# Patient Record
Sex: MALE | Race: OTHER | Employment: OTHER | URBAN - METROPOLITAN AREA
[De-identification: names, ages, dates, MRNs, and addresses within clinical notes are randomized per-mention and may not be internally consistent; named-entity substitution may affect disease eponyms.]

---

## 2017-09-12 ENCOUNTER — LAB ENCOUNTER (OUTPATIENT)
Dept: LAB | Facility: HOSPITAL | Age: 52
End: 2017-09-12
Attending: EMERGENCY MEDICINE
Payer: COMMERCIAL

## 2017-09-12 DIAGNOSIS — Z00.00 ROUTINE HEALTH MAINTENANCE: Primary | ICD-10-CM

## 2017-09-12 LAB
ANION GAP SERPL CALC-SCNC: 9 MMOL/L (ref 0–18)
BASOPHILS # BLD: 0.1 K/UL (ref 0–0.2)
BASOPHILS NFR BLD: 1 %
BUN SERPL-MCNC: 14 MG/DL (ref 8–20)
BUN/CREAT SERPL: 13.5 (ref 10–20)
CALCIUM SERPL-MCNC: 9.1 MG/DL (ref 8.5–10.5)
CEA SERPL-MCNC: 1 NG/ML (ref 0–5)
CHLORIDE SERPL-SCNC: 102 MMOL/L (ref 95–110)
CHOLEST SERPL-MCNC: 156 MG/DL (ref 110–200)
CO2 SERPL-SCNC: 28 MMOL/L (ref 22–32)
CREAT SERPL-MCNC: 1.04 MG/DL (ref 0.5–1.5)
EOSINOPHIL # BLD: 0.2 K/UL (ref 0–0.7)
EOSINOPHIL NFR BLD: 2 %
ERYTHROCYTE [DISTWIDTH] IN BLOOD BY AUTOMATED COUNT: 14.9 % (ref 11–15)
GLUCOSE SERPL-MCNC: 100 MG/DL (ref 70–99)
HBA1C MFR BLD: 5.8 % (ref 4–6)
HCT VFR BLD AUTO: 41.1 % (ref 41–52)
HDLC SERPL-MCNC: 53 MG/DL
HGB BLD-MCNC: 13.3 G/DL (ref 13.5–17.5)
LDLC SERPL CALC-MCNC: 93 MG/DL (ref 0–99)
LYMPHOCYTES # BLD: 1.8 K/UL (ref 1–4)
LYMPHOCYTES NFR BLD: 17 %
MCH RBC QN AUTO: 27.6 PG (ref 27–32)
MCHC RBC AUTO-ENTMCNC: 32.4 G/DL (ref 32–37)
MCV RBC AUTO: 85.2 FL (ref 80–100)
MONOCYTES # BLD: 1.1 K/UL (ref 0–1)
MONOCYTES NFR BLD: 11 %
NEUTROPHILS # BLD AUTO: 7.3 K/UL (ref 1.8–7.7)
NEUTROPHILS NFR BLD: 70 %
NONHDLC SERPL-MCNC: 103 MG/DL
OSMOLALITY UR CALC.SUM OF ELEC: 289 MOSM/KG (ref 275–295)
PLATELET # BLD AUTO: 302 K/UL (ref 140–400)
PMV BLD AUTO: 8.6 FL (ref 7.4–10.3)
POTASSIUM SERPL-SCNC: 4.3 MMOL/L (ref 3.3–5.1)
PSA SERPL-MCNC: 1.1 NG/ML (ref 0–4)
RBC # BLD AUTO: 4.82 M/UL (ref 4.5–5.9)
SODIUM SERPL-SCNC: 139 MMOL/L (ref 136–144)
TRIGL SERPL-MCNC: 50 MG/DL (ref 1–149)
WBC # BLD AUTO: 10.5 K/UL (ref 4–11)

## 2017-09-12 PROCEDURE — 82378 CARCINOEMBRYONIC ANTIGEN: CPT

## 2017-09-12 PROCEDURE — 36415 COLL VENOUS BLD VENIPUNCTURE: CPT

## 2017-09-12 PROCEDURE — 83036 HEMOGLOBIN GLYCOSYLATED A1C: CPT

## 2017-09-12 PROCEDURE — 80048 BASIC METABOLIC PNL TOTAL CA: CPT

## 2017-09-12 PROCEDURE — 84153 ASSAY OF PSA TOTAL: CPT

## 2017-09-12 PROCEDURE — 80061 LIPID PANEL: CPT

## 2017-09-12 PROCEDURE — 85025 COMPLETE CBC W/AUTO DIFF WBC: CPT

## 2017-10-02 NOTE — PROGRESS NOTES
[address] [facility] [address] Gastroenterology                                                                                                  Clinic History and Physical     Pa Smokeless tobacco: Never Used                      Alcohol use:  No                 Medications (Active prior to today's visit):    Current Outpatient Prescriptions:  Na Sulfate-K Sulfate-Mg Sulf (SUPREP BOWEL PREP KIT) 17.5-3.13-1.6 GM/18 discussed with patient today. See HPI and A&P for further details.       ASSESSMENT/PLAN:   Kaelyn Reeves is a 50year old year-old male pt of Dr. Stanton Vazquez with history of prediabetes, FH colon/pancreatic/liver CA, who presents for colon cancer screening tamiko & Referrals:  None       SVETA Alvarez  10/2/2017

## 2017-10-03 ENCOUNTER — OFFICE VISIT (OUTPATIENT)
Dept: GASTROENTEROLOGY | Facility: CLINIC | Age: 52
End: 2017-10-03

## 2017-10-03 ENCOUNTER — TELEPHONE (OUTPATIENT)
Dept: GASTROENTEROLOGY | Facility: CLINIC | Age: 52
End: 2017-10-03

## 2017-10-03 VITALS
BODY MASS INDEX: 23.23 KG/M2 | DIASTOLIC BLOOD PRESSURE: 79 MMHG | HEIGHT: 67 IN | SYSTOLIC BLOOD PRESSURE: 143 MMHG | WEIGHT: 148 LBS | HEART RATE: 79 BPM

## 2017-10-03 DIAGNOSIS — Z12.11 SCREENING FOR COLON CANCER: Primary | ICD-10-CM

## 2017-10-03 DIAGNOSIS — Z80.0 FAMILY HISTORY OF COLON CANCER: ICD-10-CM

## 2017-10-03 DIAGNOSIS — Z12.11 COLON CANCER SCREENING: Primary | ICD-10-CM

## 2017-10-03 PROCEDURE — 99243 OFF/OP CNSLTJ NEW/EST LOW 30: CPT | Performed by: NURSE PRACTITIONER

## 2017-10-03 PROCEDURE — 99212 OFFICE O/P EST SF 10 MIN: CPT | Performed by: NURSE PRACTITIONER

## 2017-10-03 RX ORDER — FINASTERIDE 5 MG/1
1.25 TABLET, FILM COATED ORAL DAILY
Status: ON HOLD | COMMUNITY
Start: 2017-09-21 | End: 2019-01-01

## 2017-10-03 NOTE — H&P
[address] [facility] [address] Gastroenterology                                                                                                  Clinic History and Physical     Pa Smokeless tobacco: Never Used                      Alcohol use:  No                 Medications (Active prior to today's visit):    Current Outpatient Prescriptions:  Na Sulfate-K Sulfate-Mg Sulf (SUPREP BOWEL PREP KIT) 17.5-3.13-1.6 GM/18 discussed with patient today. See HPI and A&P for further details.       ASSESSMENT/PLAN:   Dee Lin is a 50year old year-old male pt of Dr. Dipak Jordan with history of prediabetes, FH colon/pancreatic/liver CA, who presents for colon cancer screening tamiko & Referrals:  None       Maria Teresa Price Kidney, APN  10/2/2017

## 2017-10-03 NOTE — PATIENT INSTRUCTIONS
1. Schedule colonoscopy with Dr. Melanie Duval w/ [facility]     2.  bowel prep from pharmacy - split dose Suprep (eRx)    3. Continue all medications for procedure     4. Read all bowel prep instructions carefully    5.  AVOID seeds, nuts, po

## 2017-10-03 NOTE — TELEPHONE ENCOUNTER
Scheduled for: Colonoscopy 83369   Provider Name: Dr. Yaw Thomas  Date:  12/29/17  Location:  [facility]  Sedation:  IV  Time:  6140 (pt is aware to arrive at 1245)   Prep:  Suprep  Meds/Allergies Reconciled?:  Maria Teresa/JOSEN reviewed     Diagnosis with codes:  Colon cancer

## 2017-12-29 ENCOUNTER — SURGERY (OUTPATIENT)
Age: 52
End: 2017-12-29

## 2017-12-29 ENCOUNTER — HOSPITAL ENCOUNTER (OUTPATIENT)
Facility: HOSPITAL | Age: 52
Setting detail: HOSPITAL OUTPATIENT SURGERY
Discharge: HOME | End: 2017-12-29
Attending: INTERNAL MEDICINE | Admitting: INTERNAL MEDICINE
Payer: COMMERCIAL

## 2017-12-29 VITALS
HEART RATE: 78 BPM | OXYGEN SATURATION: 98 % | RESPIRATION RATE: 16 BRPM | BODY MASS INDEX: 21.97 KG/M2 | DIASTOLIC BLOOD PRESSURE: 79 MMHG | WEIGHT: 140 LBS | SYSTOLIC BLOOD PRESSURE: 108 MMHG | HEIGHT: 67 IN

## 2017-12-29 DIAGNOSIS — Z80.0 FAMILY HISTORY OF COLON CANCER: ICD-10-CM

## 2017-12-29 DIAGNOSIS — Z12.11 COLON CANCER SCREENING: ICD-10-CM

## 2017-12-29 PROCEDURE — G0500 MOD SEDAT ENDO SERVICE >5YRS: HCPCS | Performed by: INTERNAL MEDICINE

## 2017-12-29 PROCEDURE — 45378 DIAGNOSTIC COLONOSCOPY: CPT | Performed by: INTERNAL MEDICINE

## 2017-12-29 PROCEDURE — 99153 MOD SED SAME PHYS/QHP EA: CPT | Performed by: INTERNAL MEDICINE

## 2017-12-29 PROCEDURE — 0DJD8ZZ INSPECTION OF LOWER INTESTINAL TRACT, VIA NATURAL OR ARTIFICIAL OPENING ENDOSCOPIC: ICD-10-PCS | Performed by: INTERNAL MEDICINE

## 2017-12-29 RX ORDER — SODIUM CHLORIDE, SODIUM LACTATE, POTASSIUM CHLORIDE, CALCIUM CHLORIDE 600; 310; 30; 20 MG/100ML; MG/100ML; MG/100ML; MG/100ML
INJECTION, SOLUTION INTRAVENOUS CONTINUOUS
Status: DISCONTINUED | OUTPATIENT
Start: 2017-12-29 | End: 2017-12-29

## 2017-12-29 RX ORDER — CHLORAL HYDRATE 500 MG
1000 CAPSULE ORAL DAILY
COMMUNITY
End: 2018-10-30

## 2017-12-29 RX ORDER — MIDAZOLAM HYDROCHLORIDE 1 MG/ML
INJECTION INTRAMUSCULAR; INTRAVENOUS
Status: DISCONTINUED | OUTPATIENT
Start: 2017-12-29 | End: 2017-12-29

## 2017-12-29 RX ORDER — MIDAZOLAM HYDROCHLORIDE 1 MG/ML
1 INJECTION INTRAMUSCULAR; INTRAVENOUS EVERY 5 MIN PRN
Status: DISCONTINUED | OUTPATIENT
Start: 2017-12-29 | End: 2017-12-29

## 2017-12-29 RX ORDER — SODIUM CHLORIDE 0.9 % (FLUSH) 0.9 %
10 SYRINGE (ML) INJECTION AS NEEDED
Status: DISCONTINUED | OUTPATIENT
Start: 2017-12-29 | End: 2017-12-29

## 2017-12-29 NOTE — OPERATIVE REPORT
[facility] Endoscopy Report      Date of Procedure:  12/29/17      Preoperative Diagnosis:  Colorectal cancer screening      Postoperative Diagnosis:  Mild diverticulosis left colon      Procedure:    Screening colonoscopy       Surgeon:  Liisa Boas level of administered sedation, the patient was intermittently awake but comfortable during the procedure. Impression:  1. Mild diverticulosis left colon  2. Otherwise normal colonoscopy to the terminal ileum    Recommendations:  1.   High-fiber diet

## 2017-12-29 NOTE — H&P
History & Physical Examination    Patient Name: Jaguar Waldrop  MRN: [id number]  [facility]: [id number]  YOB: 1965    Diagnosis: Colorectal cancer screening        Prescriptions Prior to Admission:  omega-3 fatty acids 1000 MG Oral Cap Take 1,

## 2018-01-01 ENCOUNTER — HOSPITAL ENCOUNTER (OUTPATIENT)
Dept: MRI IMAGING | Facility: HOSPITAL | Age: 53
Discharge: HOME | End: 2018-01-01
Attending: RADIOLOGY
Payer: COMMERCIAL

## 2018-01-01 ENCOUNTER — OFFICE VISIT (OUTPATIENT)
Dept: HEMATOLOGY/ONCOLOGY | Facility: HOSPITAL | Age: 53
End: 2018-01-01
Attending: INTERNAL MEDICINE
Payer: COMMERCIAL

## 2018-01-01 ENCOUNTER — LAB ENCOUNTER (OUTPATIENT)
Dept: LAB | Facility: HOSPITAL | Age: 53
End: 2018-01-01
Attending: RADIOLOGY
Payer: COMMERCIAL

## 2018-01-01 VITALS
HEIGHT: 67 IN | WEIGHT: 147 LBS | TEMPERATURE: 98 F | SYSTOLIC BLOOD PRESSURE: 130 MMHG | BODY MASS INDEX: 23.07 KG/M2 | HEART RATE: 76 BPM | DIASTOLIC BLOOD PRESSURE: 77 MMHG | RESPIRATION RATE: 18 BRPM

## 2018-01-01 DIAGNOSIS — Z51.11 CHEMOTHERAPY MANAGEMENT, ENCOUNTER FOR: ICD-10-CM

## 2018-01-01 DIAGNOSIS — C22.0 HEPATOCELLULAR CARCINOMA (HCC): Primary | ICD-10-CM

## 2018-01-01 DIAGNOSIS — D50.9 IRON DEFICIENCY ANEMIA, UNSPECIFIED IRON DEFICIENCY ANEMIA TYPE: ICD-10-CM

## 2018-01-01 DIAGNOSIS — B15.9 VIRAL HEPATITIS A WITHOUT HEPATIC COMA: ICD-10-CM

## 2018-01-01 DIAGNOSIS — C22.0 HEPATOCELLULAR CARCINOMA (HCC): ICD-10-CM

## 2018-01-01 PROCEDURE — A9575 INJ GADOTERATE MEGLUMI 0.1ML: HCPCS | Performed by: RADIOLOGY

## 2018-01-01 PROCEDURE — 36415 COLL VENOUS BLD VENIPUNCTURE: CPT

## 2018-01-01 PROCEDURE — 99215 OFFICE O/P EST HI 40 MIN: CPT | Performed by: INTERNAL MEDICINE

## 2018-01-01 PROCEDURE — 74183 MRI ABD W/O CNTR FLWD CNTR: CPT | Performed by: RADIOLOGY

## 2018-01-01 PROCEDURE — 84100 ASSAY OF PHOSPHORUS: CPT

## 2018-01-01 PROCEDURE — 84443 ASSAY THYROID STIM HORMONE: CPT

## 2018-01-01 PROCEDURE — 80053 COMPREHEN METABOLIC PANEL: CPT

## 2018-01-01 PROCEDURE — 82105 ALPHA-FETOPROTEIN SERUM: CPT

## 2018-01-01 PROCEDURE — 85025 COMPLETE CBC W/AUTO DIFF WBC: CPT

## 2018-03-14 ENCOUNTER — HOSPITAL ENCOUNTER (OUTPATIENT)
Dept: ULTRASOUND IMAGING | Facility: HOSPITAL | Age: 53
Discharge: HOME | End: 2018-03-14
Attending: INTERNAL MEDICINE
Payer: COMMERCIAL

## 2018-03-14 DIAGNOSIS — R79.89 ELEVATED LFTS: ICD-10-CM

## 2018-03-14 PROCEDURE — 76705 ECHO EXAM OF ABDOMEN: CPT | Performed by: INTERNAL MEDICINE

## 2018-03-20 ENCOUNTER — LAB ENCOUNTER (OUTPATIENT)
Dept: LAB | Facility: HOSPITAL | Age: 53
End: 2018-03-20
Attending: INTERNAL MEDICINE
Payer: COMMERCIAL

## 2018-03-20 ENCOUNTER — HOSPITAL ENCOUNTER (OUTPATIENT)
Dept: CT IMAGING | Facility: HOSPITAL | Age: 53
Discharge: HOME | End: 2018-03-20
Attending: INTERNAL MEDICINE
Payer: COMMERCIAL

## 2018-03-20 DIAGNOSIS — R63.4 WEIGHT LOSS: ICD-10-CM

## 2018-03-20 DIAGNOSIS — R16.0 LIVER MASS: Primary | ICD-10-CM

## 2018-03-20 DIAGNOSIS — C78.7 HEPATIC METASTASIS (HCC): ICD-10-CM

## 2018-03-20 LAB — CREAT BLD-MCNC: 1 MG/DL (ref 0.5–1.5)

## 2018-03-20 PROCEDURE — 74177 CT ABD & PELVIS W/CONTRAST: CPT | Performed by: INTERNAL MEDICINE

## 2018-03-20 PROCEDURE — 36415 COLL VENOUS BLD VENIPUNCTURE: CPT

## 2018-03-20 PROCEDURE — 82105 ALPHA-FETOPROTEIN SERUM: CPT

## 2018-03-20 PROCEDURE — 82565 ASSAY OF CREATININE: CPT

## 2018-03-20 PROCEDURE — 71260 CT THORAX DX C+: CPT | Performed by: INTERNAL MEDICINE

## 2018-03-21 LAB — AFP-TM SERPL-MCNC: 173.7 NG/ML (ref 0–8.9)

## 2018-03-23 ENCOUNTER — OFFICE VISIT (OUTPATIENT)
Dept: HEMATOLOGY/ONCOLOGY | Facility: HOSPITAL | Age: 53
End: 2018-03-23
Attending: INTERNAL MEDICINE
Payer: COMMERCIAL

## 2018-03-23 ENCOUNTER — LAB ENCOUNTER (OUTPATIENT)
Dept: LAB | Facility: HOSPITAL | Age: 53
End: 2018-03-23
Attending: INTERNAL MEDICINE
Payer: COMMERCIAL

## 2018-03-23 VITALS
RESPIRATION RATE: 18 BRPM | DIASTOLIC BLOOD PRESSURE: 66 MMHG | WEIGHT: 143 LBS | HEART RATE: 105 BPM | BODY MASS INDEX: 22.44 KG/M2 | HEIGHT: 67 IN | SYSTOLIC BLOOD PRESSURE: 131 MMHG | TEMPERATURE: 99 F

## 2018-03-23 DIAGNOSIS — R16.0 LIVER MASS: Primary | ICD-10-CM

## 2018-03-23 DIAGNOSIS — IMO0001 NEEDLE STICK INJURY WITH CONTAMINATED NEEDLE, INITIAL ENCOUNTER: ICD-10-CM

## 2018-03-23 DIAGNOSIS — R77.2 ELEVATED AFP: ICD-10-CM

## 2018-03-23 DIAGNOSIS — R16.0 LIVER MASS: ICD-10-CM

## 2018-03-23 PROCEDURE — 99245 OFF/OP CONSLTJ NEW/EST HI 55: CPT | Performed by: INTERNAL MEDICINE

## 2018-03-23 PROCEDURE — 86708 HEPATITIS A ANTIBODY: CPT

## 2018-03-23 PROCEDURE — 87340 HEPATITIS B SURFACE AG IA: CPT

## 2018-03-23 PROCEDURE — 86709 HEPATITIS A IGM ANTIBODY: CPT

## 2018-03-23 PROCEDURE — 80500 HEPATITIS A B + C PROFILE: CPT

## 2018-03-23 PROCEDURE — 86704 HEP B CORE ANTIBODY TOTAL: CPT

## 2018-03-23 PROCEDURE — 86706 HEP B SURFACE ANTIBODY: CPT

## 2018-03-23 PROCEDURE — 87389 HIV-1 AG W/HIV-1&-2 AB AG IA: CPT

## 2018-03-23 PROCEDURE — 36415 COLL VENOUS BLD VENIPUNCTURE: CPT

## 2018-03-23 PROCEDURE — 86803 HEPATITIS C AB TEST: CPT

## 2018-03-23 NOTE — CONSULTS
[facility] [place] [place]    PATIENT'S NAME: David Nielsen, [address]  PHYSICIAN: Joaquim Rivera.  Jodie Dawson MD   PATIENT ACCOUNT #: [de-identified] LOCATION: [address] [place] [address] RECORD #: [id number] YOB: 1965   CONSULTATION DATE: 03/23/2018       CANCER CE paternal uncle with liver cancer. SOCIAL HISTORY:  The patient works as a nurse in the emergency department. He denies any alcohol, tobacco, or illicit drug use. REVIEW OF SYSTEMS:  All other systems are reviewed and are negative x12.       PHYSICAL 17:55:11  t: 03/23/2018 18:16:47  Job [id number]  [facility]/    cc: Jazzmine Novoa MD

## 2018-03-26 LAB
HAV AB SER QL IA: REACTIVE
HAV IGM SERPL QL IA: NONREACTIVE
HBV CORE AB SERPL QL IA: NONREACTIVE
HBV SURFACE AB SER-ACNC: <3.1 MIU/ML (ref ?–10)
HBV SURFACE AG SERPL QL IA: NONREACTIVE
HBV SURFACE AG SERPL QL IA: NONREACTIVE
HCV AB SERPL QL IA: NONREACTIVE
HIV1+2 AB SERPL QL IA: NONREACTIVE

## 2018-03-27 ENCOUNTER — HOSPITAL ENCOUNTER (OUTPATIENT)
Dept: MRI IMAGING | Facility: HOSPITAL | Age: 53
Discharge: HOME | End: 2018-03-27
Attending: INTERNAL MEDICINE
Payer: COMMERCIAL

## 2018-03-27 DIAGNOSIS — R16.0 LIVER MASS: ICD-10-CM

## 2018-03-27 DIAGNOSIS — R77.2 ELEVATED AFP: ICD-10-CM

## 2018-03-27 PROCEDURE — A9576 INJ PROHANCE MULTIPACK: HCPCS | Performed by: INTERNAL MEDICINE

## 2018-03-27 PROCEDURE — 74183 MRI ABD W/O CNTR FLWD CNTR: CPT | Performed by: INTERNAL MEDICINE

## 2018-03-30 ENCOUNTER — TELEPHONE (OUTPATIENT)
Dept: HEMATOLOGY/ONCOLOGY | Facility: HOSPITAL | Age: 53
End: 2018-03-30

## 2018-03-30 ENCOUNTER — OFFICE VISIT (OUTPATIENT)
Dept: HEMATOLOGY/ONCOLOGY | Facility: HOSPITAL | Age: 53
End: 2018-03-30
Attending: INTERNAL MEDICINE
Payer: COMMERCIAL

## 2018-03-30 VITALS
RESPIRATION RATE: 16 BRPM | SYSTOLIC BLOOD PRESSURE: 129 MMHG | TEMPERATURE: 97 F | DIASTOLIC BLOOD PRESSURE: 81 MMHG | HEIGHT: 67 IN | BODY MASS INDEX: 22.29 KG/M2 | WEIGHT: 142 LBS | HEART RATE: 77 BPM

## 2018-03-30 DIAGNOSIS — C22.0 HEPATOCELLULAR CARCINOMA (HCC): Primary | ICD-10-CM

## 2018-03-30 DIAGNOSIS — B15.9 VIRAL HEPATITIS A WITHOUT HEPATIC COMA: ICD-10-CM

## 2018-03-30 DIAGNOSIS — Z51.11 CHEMOTHERAPY MANAGEMENT, ENCOUNTER FOR: ICD-10-CM

## 2018-03-30 PROCEDURE — 99215 OFFICE O/P EST HI 40 MIN: CPT | Performed by: INTERNAL MEDICINE

## 2018-03-30 NOTE — PROGRESS NOTES
Cancer Center Progress Note    Patient Name: Ruby Lin   YOB: 1965   Medical Record Number: [id number]   Attending Physician: Dakota Ford M.D.      Chief Complaint:  [address] hx of hepatitis A    History of Present Illness:  Cancer  50-yea Occupational History  None on file     Social History Main Topics   Smoking status: Never Smoker    Smokeless tobacco: Never Used    Alcohol use No    Drug use: No    Sexual activity: Not on file     Other Topics Concern   None on file     Social Histo 3.2 (L) 03/12/2018   GLOBULT 3.4 07/22/2016   GLOBULIN 4.5 (H) 03/12/2018    03/12/2018   K 4.6 03/12/2018    03/12/2018   CO2 27 03/12/2018       Radiology:  MRI liver 3/27/18  1.  Large hepatic mass involving much of the right hepatic lobe seg

## 2018-03-30 NOTE — TELEPHONE ENCOUNTER
Called IR, let them know that Dr Shaista King wants patient seen by IR for his [facility].. They will contact patient.

## 2018-04-04 ENCOUNTER — OFFICE VISIT (OUTPATIENT)
Dept: INTERVENTIONAL RADIOLOGY/VASCULAR | Facility: HOSPITAL | Age: 53
End: 2018-04-04
Attending: RADIOLOGY
Payer: COMMERCIAL

## 2018-04-04 DIAGNOSIS — C22.0 HEPATOCELLULAR CARCINOMA (HCC): Primary | ICD-10-CM

## 2018-04-09 ENCOUNTER — TELEPHONE (OUTPATIENT)
Dept: HEMATOLOGY/ONCOLOGY | Facility: HOSPITAL | Age: 53
End: 2018-04-09

## 2018-04-09 NOTE — TELEPHONE ENCOUNTER
Gwenlyn Lesch called with two questions, first question he was told he would receive a call regarding his medication Sorafenib on when it would be delivered. He has not heard anything. Then secondly he was told that he cannot travel right now.  So he is asking for

## 2018-04-11 ENCOUNTER — HOSPITAL ENCOUNTER (OUTPATIENT)
Dept: INTERVENTIONAL RADIOLOGY/VASCULAR | Facility: HOSPITAL | Age: 53
Discharge: HOME | End: 2018-04-11
Attending: RADIOLOGY | Admitting: RADIOLOGY
Payer: COMMERCIAL

## 2018-04-11 ENCOUNTER — HOSPITAL ENCOUNTER (OUTPATIENT)
Dept: NUCLEAR MEDICINE | Facility: HOSPITAL | Age: 53
Discharge: HOME | End: 2018-04-11
Attending: RADIOLOGY
Payer: COMMERCIAL

## 2018-04-11 VITALS
HEIGHT: 67 IN | WEIGHT: 135 LBS | SYSTOLIC BLOOD PRESSURE: 125 MMHG | DIASTOLIC BLOOD PRESSURE: 96 MMHG | OXYGEN SATURATION: 98 % | RESPIRATION RATE: 14 BRPM | HEART RATE: 74 BPM | BODY MASS INDEX: 21.19 KG/M2

## 2018-04-11 DIAGNOSIS — C22.0 HEPATOCARCINOMA (HCC): ICD-10-CM

## 2018-04-11 DIAGNOSIS — C22.0 HEPATOCELLULAR CARCINOMA (HCC): ICD-10-CM

## 2018-04-11 PROCEDURE — 85027 COMPLETE CBC AUTOMATED: CPT | Performed by: RADIOLOGY

## 2018-04-11 PROCEDURE — 78202 LIVER IMAGING WITH VASC FLOW: CPT | Performed by: RADIOLOGY

## 2018-04-11 PROCEDURE — 99153 MOD SED SAME PHYS/QHP EA: CPT

## 2018-04-11 PROCEDURE — 99152 MOD SED SAME PHYS/QHP 5/>YRS: CPT

## 2018-04-11 PROCEDURE — 36247 INS CATH ABD/L-EXT ART 3RD: CPT

## 2018-04-11 PROCEDURE — 36245 INS CATH ABD/L-EXT ART 1ST: CPT

## 2018-04-11 PROCEDURE — 36248 INS CATH ABD/L-EXT ART ADDL: CPT

## 2018-04-11 PROCEDURE — B4121ZZ FLUOROSCOPY OF HEPATIC ARTERY USING LOW OSMOLAR CONTRAST: ICD-10-PCS | Performed by: RADIOLOGY

## 2018-04-11 PROCEDURE — B4141ZZ FLUOROSCOPY OF SUPERIOR MESENTERIC ARTERY USING LOW OSMOLAR CONTRAST: ICD-10-PCS | Performed by: RADIOLOGY

## 2018-04-11 PROCEDURE — 75726 ARTERY X-RAYS ABDOMEN: CPT

## 2018-04-11 RX ORDER — LIDOCAINE HYDROCHLORIDE 20 MG/ML
INJECTION, SOLUTION EPIDURAL; INFILTRATION; INTRACAUDAL; PERINEURAL
Status: COMPLETED
Start: 2018-04-11 | End: 2018-04-11

## 2018-04-11 RX ORDER — LIDOCAINE HYDROCHLORIDE 20 MG/ML
INJECTION, SOLUTION EPIDURAL; INFILTRATION; INTRACAUDAL; PERINEURAL
Status: DISCONTINUED
Start: 2018-04-11 | End: 2018-04-11 | Stop reason: WASHOUT

## 2018-04-11 RX ORDER — MIDAZOLAM HYDROCHLORIDE 1 MG/ML
INJECTION INTRAMUSCULAR; INTRAVENOUS
Status: COMPLETED
Start: 2018-04-11 | End: 2018-04-11

## 2018-04-11 RX ORDER — SODIUM CHLORIDE 9 MG/ML
INJECTION, SOLUTION INTRAVENOUS ONCE
Status: COMPLETED | OUTPATIENT
Start: 2018-04-11 | End: 2018-04-11

## 2018-04-11 RX ORDER — SODIUM CHLORIDE 9 MG/ML
INJECTION, SOLUTION INTRAVENOUS
Status: COMPLETED
Start: 2018-04-11 | End: 2018-04-11

## 2018-04-11 RX ADMIN — SODIUM CHLORIDE: 9 INJECTION, SOLUTION INTRAVENOUS at 07:00:00

## 2018-04-11 NOTE — PROCEDURES
[facility]  Procedure Note    [address] Patient Status:  Outpatient in a Bed    1965 MRN [id number]   Location [facility] Attending Kiarra Saenz MD   Hosp Day # 0 PCP José Miguel Collins MD

## 2018-04-11 NOTE — PROGRESS NOTES
Pt was able to eat and drink, no nausea or vomiting. Pt ambulated and site remained clean, dry and intact.  Discharge instructions were given and discharged

## 2018-04-11 NOTE — PRE-SEDATION ASSESSMENT
IR Pre-Procedure Sedation Assessment    History of snoring or sleep or apnea?    No    History of previous problems with anesthesia or sedation  No    Physical Findings:  Neck: nl ROM  CV: RRR  PULM: normal respiratory rate/effort    Mallampati Score:  II (

## 2018-04-11 NOTE — H&P
[address] [facility] Patient Status:  Outpatient in a Bed    1965 MRN [id number]   Location [facility] Attending Josh Higgins MD   Hosp Day # 0 PCP Jeronimo Basilio Iva Oropeza MD  4/11/2018  7:45 AM

## 2018-04-17 ENCOUNTER — TELEPHONE (OUTPATIENT)
Dept: HEMATOLOGY/ONCOLOGY | Facility: HOSPITAL | Age: 53
End: 2018-04-17

## 2018-04-17 NOTE — TELEPHONE ENCOUNTER
Received after hours message that patients Nexavar will be delivered. Will  coordinate delivery with patient.  mckenna

## 2018-04-17 NOTE — TELEPHONE ENCOUNTER
Called [employer]. Raoul  spoke with Germaine Fly Sorafenib Tosylate 200MG oral tab ready to ship. Patient has $70.00 co-pay left a message for patient to call [email or web address]. [email or web address].  I called patient also left a message with the same information to call [employer]..

## 2018-04-18 ENCOUNTER — LAB ENCOUNTER (OUTPATIENT)
Dept: LAB | Facility: HOSPITAL | Age: 53
End: 2018-04-18
Attending: INTERNAL MEDICINE
Payer: COMMERCIAL

## 2018-04-18 DIAGNOSIS — C22.0 HEPATOCELLULAR CARCINOMA (HCC): ICD-10-CM

## 2018-04-18 PROCEDURE — 84100 ASSAY OF PHOSPHORUS: CPT

## 2018-04-18 PROCEDURE — 36415 COLL VENOUS BLD VENIPUNCTURE: CPT

## 2018-04-18 PROCEDURE — 80053 COMPREHEN METABOLIC PANEL: CPT

## 2018-04-18 PROCEDURE — 84443 ASSAY THYROID STIM HORMONE: CPT

## 2018-04-18 PROCEDURE — 80050 GENERAL HEALTH PANEL: CPT

## 2018-04-19 ENCOUNTER — OFFICE VISIT (OUTPATIENT)
Dept: HEMATOLOGY/ONCOLOGY | Facility: HOSPITAL | Age: 53
End: 2018-04-19
Attending: PHYSICIAN ASSISTANT
Payer: COMMERCIAL

## 2018-04-19 DIAGNOSIS — C22.0 HEPATOCELLULAR CARCINOMA (HCC): Primary | ICD-10-CM

## 2018-04-19 PROCEDURE — 99215 OFFICE O/P EST HI 40 MIN: CPT | Performed by: PHYSICIAN ASSISTANT

## 2018-04-19 RX ORDER — PROCHLORPERAZINE MALEATE 10 MG
10 TABLET ORAL EVERY 6 HOURS PRN
Qty: 60 TABLET | Refills: 3 | Status: SHIPPED | OUTPATIENT
Start: 2018-04-19 | End: 2018-06-08

## 2018-04-19 NOTE — PROGRESS NOTES
Medication Education Record: Oral Therapy    Learner:  Patient    Barriers / Limitations:  None    Diagnosis:   Hepatocellular carcinoma      Medication Name Dose/Strength Frequency   Sorafenib 400 mg ( mg tablets) Twice daily                     Nu large meals  o Choose high calorie/high protein foods (chicken, hard cooked eggs, peanut butter, cheese)  o If nauseated, try dry foods, such as toast, crackers or pretzels; light or bland foods, such as applesauce or oatmeal.    Fluid intake:  o Drink 8-1 alcohol-free moisturizer as needed.       When to call the doctor:  • Fever of 100.5 or greater or shaking chills  • Nausea/vomiting not controlled with anti-nausea medications: Unable to drink for 24 hours or have signs of dehydration: tiredness, thirst, d the following precautions must be taken to lessen any exposure to the medication. Handling oral medication:    1. Confirm that the medication is appropriately labeled. 2. Only patients who need chemotherapy should take or touch the medication.     3. I must wear gloves if exposed to the patient’s blood, urine, stool or vomit. Dispose of the used gloves after each use and wash hands with soap and water.   2. Any sheets or clothes soiled with the bodily fluids should be machine washed twice in hot water wi the patient. The patient/support person  was attentive during education, verbalized understanding, all questions were answered and patient was instructed to call with further questions. Reinforcement of education is needed at next visit?  No  60 minute

## 2018-04-19 NOTE — PATIENT INSTRUCTIONS
Medication Education Record: Oral Therapy    Learner:  Patient    Barriers / Limitations:  None    Diagnosis:   Hepatocellular carcinoma      Medication Name Dose/Strength Frequency   Sorafenib 400 mg ( mg tablets) Twice daily                     Nu large meals  o Choose high calorie/high protein foods (chicken, hard cooked eggs, peanut butter, cheese)  o If nauseated, try dry foods, such as toast, crackers or pretzels; light or bland foods, such as applesauce or oatmeal.    Fluid intake:  o Drink 8-1 alcohol-free moisturizer as needed.       When to call the doctor:  • Fever of 100.5 or greater or shaking chills  • Nausea/vomiting not controlled with anti-nausea medications: Unable to drink for 24 hours or have signs of dehydration: tiredness, thirst, d medication. Handling oral medication:    1. Confirm that the medication is appropriately labeled. 2. Only patients who need chemotherapy should take or touch the medication.     3. If caregivers are involved, caregivers should wear gloves when administ vomit.  Dispose of the used gloves after each use and wash hands with soap and water. 2. Any sheets or clothes soiled with the bodily fluids should be machine washed twice in hot water with regular laundry detergent.   Do not wash soiled garments with hand

## 2018-04-20 ENCOUNTER — TELEPHONE (OUTPATIENT)
Dept: HEMATOLOGY/ONCOLOGY | Facility: HOSPITAL | Age: 53
End: 2018-04-20

## 2018-04-20 DIAGNOSIS — C22.0 HEPATOCELLULAR CARCINOMA (HCC): Primary | ICD-10-CM

## 2018-04-20 NOTE — TELEPHONE ENCOUNTER
Nicci Karimi calling regarding his appointment next week. He said Dr Dejan Dutton likes to see his patients after 3weeks after taking his medication. Please advise.  mckenna

## 2018-04-20 NOTE — TELEPHONE ENCOUNTER
Patient ok with cancelling 4/27/18 appt with Dr. [facility] and follow-up with him in 3 weeks with labs. He feels he knows what Dr. [facility] will comment on as far as diet/exercise/lifestyle.   Ultimately, his response (and potential side effects) will tailor some o

## 2018-04-27 ENCOUNTER — APPOINTMENT (OUTPATIENT)
Dept: HEMATOLOGY/ONCOLOGY | Facility: HOSPITAL | Age: 53
End: 2018-04-27
Attending: INTERNAL MEDICINE
Payer: COMMERCIAL

## 2018-04-28 ENCOUNTER — TELEPHONE (OUTPATIENT)
Dept: HEMATOLOGY/ONCOLOGY | Facility: HOSPITAL | Age: 53
End: 2018-04-28

## 2018-04-28 RX ORDER — DEXAMETHASONE 4 MG/1
4 TABLET ORAL
Qty: 30 TABLET | Refills: 0 | Status: SHIPPED | OUTPATIENT
Start: 2018-04-28 | End: 2018-05-28

## 2018-04-28 NOTE — TELEPHONE ENCOUNTER
Patient calling again with worsening pain at the R chest/abdomen. He did take the dexamethasone after lunch at 1pm.  States pain was worse later in the afternoon and went to the ED.   States when he was getting out of the car, his pain was decreased and

## 2018-04-28 NOTE — TELEPHONE ENCOUNTER
Patient called to c/o pain in the R mid ribs mid line. States pain is very sharp and that it improves with ibuprofen taking 600mg every 6 hrs on schedule. Also had a fever a few days ago, but states was having prior to sorafenib.   Has Y-90 scheduled this

## 2018-04-30 RX ORDER — HYDROCODONE BITARTRATE AND ACETAMINOPHEN 5; 325 MG/1; MG/1
1 TABLET ORAL EVERY 4 HOURS PRN
Qty: 60 TABLET | Refills: 0 | Status: SHIPPED | OUTPATIENT
Start: 2018-04-30 | End: 2019-01-01

## 2018-04-30 NOTE — TELEPHONE ENCOUNTER
I called and updated Farideh Jackson that Dr Racquel Bess has written a script for Norco 5-325 (1 tab po Q4 hrs prn QTY 60 no refills). It is waiting for him at the . Farideh Jackson said he would be in today to pick it up.

## 2018-04-30 NOTE — TELEPHONE ENCOUNTER
Condition Update/Patient question:    Marifer Quan reports he is on Day 9 of taking Sorafenib Tosylate (200mg tab - 2 tabs po BID). He had 3 episodes of terrible pain on Saturday 4/28/2018. He was experiencing pain in his right ribs, mid line. The first episode lasted 30 min & then resolved. The second & third episodes woke him from his sleep. The second lasted 45 min & the last episode lasted 1 hr. Marifer Quan reports his pain was so intense during the 3rd episode that he could barely take a breath. Dr Bhaskar Hammer prescribed dexamethasone 4mg po daily in the am with a meal and asked him to try to decrease the ibuprofen. He called Dr Bhaskar Hammer back after the second episode asking if he could take the dexamethasone BID instead of daily. She agreed. He states she mentioned he may need a script for Norco. He would need to come to the office to pick it up. She suggested he call today. Marifer Quan reports he has not used any ibuprofen since Saturday night. Marifer Quan stated he is having his Y 80 this Wednesday. He is not currently having pain but he is very afraid of having another attack.  He wants to know if Dr Carol Soliz would consider writing a script for Norco?

## 2018-04-30 NOTE — TELEPHONE ENCOUNTER
Rosalina Hargrove wanted to speak with the nurse. He said, he had very bad pain in his right side. He said, he spoke Dr Roberto Caldwell on Friday who prescribed him a medication. Which is not helping. He stated, he needs a stronger medication.  Please Advise Thanks

## 2018-05-02 ENCOUNTER — HOSPITAL ENCOUNTER (OUTPATIENT)
Dept: NUCLEAR MEDICINE | Facility: HOSPITAL | Age: 53
Discharge: HOME | End: 2018-05-02
Attending: RADIOLOGY
Payer: COMMERCIAL

## 2018-05-02 ENCOUNTER — HOSPITAL ENCOUNTER (OUTPATIENT)
Dept: INTERVENTIONAL RADIOLOGY/VASCULAR | Facility: HOSPITAL | Age: 53
Discharge: HOME | End: 2018-05-02
Attending: RADIOLOGY | Admitting: RADIOLOGY
Payer: COMMERCIAL

## 2018-05-02 VITALS
WEIGHT: 132 LBS | DIASTOLIC BLOOD PRESSURE: 81 MMHG | BODY MASS INDEX: 20.72 KG/M2 | RESPIRATION RATE: 17 BRPM | SYSTOLIC BLOOD PRESSURE: 128 MMHG | HEART RATE: 65 BPM | OXYGEN SATURATION: 99 % | HEIGHT: 67 IN

## 2018-05-02 DIAGNOSIS — C22.0 HEPATOCELLULAR CARCINOMA (HCC): ICD-10-CM

## 2018-05-02 PROCEDURE — 85610 PROTHROMBIN TIME: CPT | Performed by: RADIOLOGY

## 2018-05-02 PROCEDURE — 82105 ALPHA-FETOPROTEIN SERUM: CPT | Performed by: RADIOLOGY

## 2018-05-02 PROCEDURE — 77790 RADIATION HANDLING: CPT | Performed by: RADIOLOGY

## 2018-05-02 PROCEDURE — 36247 INS CATH ABD/L-EXT ART 3RD: CPT

## 2018-05-02 PROCEDURE — 99152 MOD SED SAME PHYS/QHP 5/>YRS: CPT

## 2018-05-02 PROCEDURE — 36248 INS CATH ABD/L-EXT ART ADDL: CPT

## 2018-05-02 PROCEDURE — 37243 VASC EMBOLIZE/OCCLUDE ORGAN: CPT

## 2018-05-02 PROCEDURE — 79445 NUCLEAR RX INTRA-ARTERIAL: CPT

## 2018-05-02 PROCEDURE — 04L33DZ OCCLUSION OF HEPATIC ARTERY WITH INTRALUMINAL DEVICE, PERCUTANEOUS APPROACH: ICD-10-PCS | Performed by: RADIOLOGY

## 2018-05-02 PROCEDURE — 99153 MOD SED SAME PHYS/QHP EA: CPT

## 2018-05-02 RX ORDER — ONDANSETRON 2 MG/ML
INJECTION INTRAMUSCULAR; INTRAVENOUS
Status: COMPLETED
Start: 2018-05-02 | End: 2018-05-02

## 2018-05-02 RX ORDER — SODIUM CHLORIDE 9 MG/ML
INJECTION, SOLUTION INTRAVENOUS
Status: COMPLETED
Start: 2018-05-02 | End: 2018-05-02

## 2018-05-02 RX ORDER — VERAPAMIL HYDROCHLORIDE 2.5 MG/ML
INJECTION, SOLUTION INTRAVENOUS
Status: COMPLETED
Start: 2018-05-02 | End: 2018-05-02

## 2018-05-02 RX ORDER — ONDANSETRON 2 MG/ML
4 INJECTION INTRAMUSCULAR; INTRAVENOUS EVERY 6 HOURS PRN
Status: DISCONTINUED | OUTPATIENT
Start: 2018-05-02 | End: 2018-05-02

## 2018-05-02 RX ORDER — MIDAZOLAM HYDROCHLORIDE 1 MG/ML
INJECTION INTRAMUSCULAR; INTRAVENOUS
Status: COMPLETED
Start: 2018-05-02 | End: 2018-05-02

## 2018-05-02 RX ORDER — NITROGLYCERIN 20 MG/100ML
INJECTION INTRAVENOUS
Status: COMPLETED
Start: 2018-05-02 | End: 2018-05-02

## 2018-05-02 RX ORDER — LIDOCAINE HYDROCHLORIDE 20 MG/ML
INJECTION, SOLUTION EPIDURAL; INFILTRATION; INTRACAUDAL; PERINEURAL
Status: COMPLETED
Start: 2018-05-02 | End: 2018-05-02

## 2018-05-02 RX ORDER — SODIUM CHLORIDE 9 MG/ML
INJECTION, SOLUTION INTRAVENOUS ONCE
Status: DISCONTINUED | OUTPATIENT
Start: 2018-05-02 | End: 2018-05-02

## 2018-05-02 RX ADMIN — ONDANSETRON 4 MG: 2 INJECTION INTRAMUSCULAR; INTRAVENOUS at 10:43:00

## 2018-05-02 NOTE — PROCEDURES
[facility]  Procedure Note    Leilash[address] Luna Patient Status:  Outpatient in a Bed    1965 MRN [id number]   Location [facility] Attending Jason Bryson MD   Hosp Day # 0 PCP Salome Kaur MD

## 2018-05-02 NOTE — INTERVAL H&P NOTE
The above referenced H&P was reviewed by Sarah Rivera MD on 5/2/2018, the patient was examined and no significant changes have occurred in the patient's condition since the H&P was performed.   Risks, benefits, alternative treatments and consequences

## 2018-05-02 NOTE — PLAN OF CARE
Pt ambulated/voided/took liquids/crackers and tolerated well Site soft dry and intact after ambulation

## 2018-05-02 NOTE — H&P (VIEW-ONLY)
[address] [facility] Patient Status:  Outpatient in a Bed    1965 MRN [id number]   Location [facility] Attending Graciela Bradley MD   Hosp Day # 0 PCP Rosi Owusu Evert Duggan MD  4/11/2018  7:45 AM

## 2018-05-08 ENCOUNTER — TELEPHONE (OUTPATIENT)
Dept: HEMATOLOGY/ONCOLOGY | Facility: HOSPITAL | Age: 53
End: 2018-05-08

## 2018-05-11 ENCOUNTER — OFFICE VISIT (OUTPATIENT)
Dept: HEMATOLOGY/ONCOLOGY | Facility: HOSPITAL | Age: 53
End: 2018-05-11
Attending: INTERNAL MEDICINE
Payer: COMMERCIAL

## 2018-05-11 ENCOUNTER — LAB ENCOUNTER (OUTPATIENT)
Dept: LAB | Facility: HOSPITAL | Age: 53
End: 2018-05-11
Attending: INTERNAL MEDICINE
Payer: COMMERCIAL

## 2018-05-11 VITALS
BODY MASS INDEX: 21.35 KG/M2 | HEIGHT: 67 IN | HEART RATE: 73 BPM | TEMPERATURE: 97 F | WEIGHT: 136 LBS | DIASTOLIC BLOOD PRESSURE: 89 MMHG | SYSTOLIC BLOOD PRESSURE: 129 MMHG | RESPIRATION RATE: 16 BRPM

## 2018-05-11 DIAGNOSIS — D64.9 ANEMIA, UNSPECIFIED TYPE: ICD-10-CM

## 2018-05-11 DIAGNOSIS — Z51.11 CHEMOTHERAPY MANAGEMENT, ENCOUNTER FOR: ICD-10-CM

## 2018-05-11 DIAGNOSIS — C22.0 HEPATOCELLULAR CARCINOMA (HCC): ICD-10-CM

## 2018-05-11 DIAGNOSIS — B15.9 VIRAL HEPATITIS A WITHOUT HEPATIC COMA: ICD-10-CM

## 2018-05-11 DIAGNOSIS — C22.0 HEPATOCELLULAR CARCINOMA (HCC): Primary | ICD-10-CM

## 2018-05-11 PROCEDURE — 36415 COLL VENOUS BLD VENIPUNCTURE: CPT

## 2018-05-11 PROCEDURE — 84100 ASSAY OF PHOSPHORUS: CPT

## 2018-05-11 PROCEDURE — 80053 COMPREHEN METABOLIC PANEL: CPT

## 2018-05-11 PROCEDURE — 99215 OFFICE O/P EST HI 40 MIN: CPT | Performed by: INTERNAL MEDICINE

## 2018-05-11 PROCEDURE — 85025 COMPLETE CBC W/AUTO DIFF WBC: CPT

## 2018-05-11 PROCEDURE — 84443 ASSAY THYROID STIM HORMONE: CPT

## 2018-05-11 NOTE — PROGRESS NOTES
Cancer Center Progress Note    Patient Name: Melissa Otero   YOB: 1965   Medical Record Number: [id number]   Attending Physician: Akira Nunes M.D.      Chief Complaint:  [address] hx of hepatitis A    History of Present Illness:  Cancer  50 Social History:    Social History  Social History   Marital status: Single  Spouse name: N/A    Years of education: N/A  Number of children: N/A     Occupational History  None on file     Social History Main Topics   Smoking status: Never Smoker    S Abdomen: Soft, non tender. Extremities: No edema. Neurological: 5/5 motor x4.         Laboratory:  Recent Labs   Lab  05/11/18   1018   WBC  7.0   HGB  11.9*   PLT  387   NE  5.9           Lab Results  Component Value Date    (H) 05/11/2018   GL being was assessed and resources were discussed. Appropriate resources were reviewed and discussed with the pateint and family.      Mercy Giang MD

## 2018-05-30 ENCOUNTER — TELEPHONE (OUTPATIENT)
Dept: HEMATOLOGY/ONCOLOGY | Facility: HOSPITAL | Age: 53
End: 2018-05-30

## 2018-05-30 NOTE — TELEPHONE ENCOUNTER
Spoke to patient regarding his symptoms (see mychart message from patient.)  He has intermittent pain to bottoms of feet, says his calluses are slightly darker than usual around the edges. No peeling, skin intact. Reports that motrin helps with the pain.

## 2018-06-06 ENCOUNTER — HOSPITAL ENCOUNTER (OUTPATIENT)
Dept: MRI IMAGING | Facility: HOSPITAL | Age: 53
Discharge: HOME | End: 2018-06-06
Attending: RADIOLOGY
Payer: COMMERCIAL

## 2018-06-06 ENCOUNTER — LAB ENCOUNTER (OUTPATIENT)
Dept: LAB | Facility: HOSPITAL | Age: 53
End: 2018-06-06
Attending: RADIOLOGY
Payer: COMMERCIAL

## 2018-06-06 DIAGNOSIS — C22.0 HEPATOCELLULAR CARCINOMA (HCC): ICD-10-CM

## 2018-06-06 PROCEDURE — 80053 COMPREHEN METABOLIC PANEL: CPT

## 2018-06-06 PROCEDURE — A9576 INJ PROHANCE MULTIPACK: HCPCS | Performed by: RADIOLOGY

## 2018-06-06 PROCEDURE — 36415 COLL VENOUS BLD VENIPUNCTURE: CPT

## 2018-06-06 PROCEDURE — 82105 ALPHA-FETOPROTEIN SERUM: CPT

## 2018-06-06 PROCEDURE — 74183 MRI ABD W/O CNTR FLWD CNTR: CPT | Performed by: RADIOLOGY

## 2018-06-06 PROCEDURE — 85025 COMPLETE CBC W/AUTO DIFF WBC: CPT

## 2018-06-08 ENCOUNTER — OFFICE VISIT (OUTPATIENT)
Dept: HEMATOLOGY/ONCOLOGY | Facility: HOSPITAL | Age: 53
End: 2018-06-08
Attending: INTERNAL MEDICINE
Payer: COMMERCIAL

## 2018-06-08 VITALS
DIASTOLIC BLOOD PRESSURE: 92 MMHG | BODY MASS INDEX: 21 KG/M2 | WEIGHT: 137 LBS | HEART RATE: 97 BPM | SYSTOLIC BLOOD PRESSURE: 136 MMHG | TEMPERATURE: 98 F | RESPIRATION RATE: 16 BRPM

## 2018-06-08 DIAGNOSIS — B15.9 VIRAL HEPATITIS A WITHOUT HEPATIC COMA: ICD-10-CM

## 2018-06-08 DIAGNOSIS — Z51.11 CHEMOTHERAPY MANAGEMENT, ENCOUNTER FOR: ICD-10-CM

## 2018-06-08 DIAGNOSIS — D64.9 ANEMIA, UNSPECIFIED TYPE: ICD-10-CM

## 2018-06-08 DIAGNOSIS — C22.0 HEPATOCELLULAR CARCINOMA (HCC): Primary | ICD-10-CM

## 2018-06-08 PROCEDURE — 99215 OFFICE O/P EST HI 40 MIN: CPT | Performed by: INTERNAL MEDICINE

## 2018-06-08 NOTE — PROGRESS NOTES
Cancer Center Progress Note    Patient Name: Eduarda Haynes   YOB: 1965   Medical Record Number: [id number]   Attending Physician: Heath Louis M.D.      Chief Complaint:  [address] hx of hepatitis A    History of Present Illness:  Cancer  50 Age of Onset   • Colon Cancer Paternal Grandfather        Social History:    Social History  Social History   Marital status: Single  Spouse name: N/A    Years of education: N/A  Number of children: N/A     Occupational History  None on file     Social His edema. Neurological: 5/5 motor x4.         Laboratory:  Recent Labs   Lab  06/06/18   0855   WBC  6.9   HGB  12.8*   PLT  349   NE  5.2           Lab Results  Component Value Date   GLU 94 06/06/2018   BUN 12 06/06/2018   BUNCREA 14.0 06/06/2018   TAMMY hepatocellular carcinoma chemotherapy encounter    The patient's emotional well being was assessed and resources were discussed. Appropriate resources were reviewed and discussed with the pateint and family.      Mey Valadez MD

## 2018-06-13 ENCOUNTER — TELEPHONE (OUTPATIENT)
Dept: HEMATOLOGY/ONCOLOGY | Facility: HOSPITAL | Age: 53
End: 2018-06-13

## 2018-06-18 ENCOUNTER — OFFICE VISIT (OUTPATIENT)
Dept: HEMATOLOGY/ONCOLOGY | Facility: HOSPITAL | Age: 53
End: 2018-06-18
Attending: PHYSICIAN ASSISTANT
Payer: COMMERCIAL

## 2018-06-18 DIAGNOSIS — C22.0 HEPATOCELLULAR CARCINOMA (HCC): Primary | ICD-10-CM

## 2018-06-18 PROCEDURE — 99215 OFFICE O/P EST HI 40 MIN: CPT | Performed by: PHYSICIAN ASSISTANT

## 2018-06-18 NOTE — PROGRESS NOTES
Medication Education Record: IV Therapy    Learner:  Patient    Barriers / Limitations:  None    Diagnosis:   Hepatocellular carcinoma    IV Cancer Treatment Name(s): Nivolumab  IV Cancer Treatment Frequency  Once every 2 weeks   Number of cycles planned B your provider):  Prochloperazine (Compazine) 10 mg every 6 hours    Helpful hints during cancer treatment:    Diet:  o Avoid greasy or spicy foods on days surrounding chemotherapy  o Eat small frequent meals per day (6-7 meals) rather than 3 large meals  o Care  o Avoid direct sunlight.  o Wear a broad-spectrum sunscreen with an SPF of 30 or higher on any skin exposed to the sun. Re-apply every 2 hours if in the sun and after bathing or sweating.   o For dry skin, use an alcohol-free lotion twice per day, es following precautions must be taken to lessen any exposure to the medication. Handling Body Waste:   1. Caregivers must wear gloves if exposed to the patient’s blood, urine, stool or vomit.   Dispose of the used gloves after each use and wash hands with become irregular during and after treatment, so you may not know if you are at a time in your cycle when you could become pregnant or if you are actually pregnant.       Cancer treatment education, including treatment plan, supportive medications, and post-

## 2018-06-18 NOTE — PATIENT INSTRUCTIONS
Medication Education Record: IV Therapy    Learner:  Patient    Barriers / Limitations:  None    Diagnosis:   Hepatocellular carcinoma    IV Cancer Treatment Name(s): Nivolumab  IV Cancer Treatment Frequency  Once every 2 weeks   Number of cycles planned B your provider):  Prochloperazine (Compazine) 10 mg every 6 hours    Helpful hints during cancer treatment:    Diet:  o Avoid greasy or spicy foods on days surrounding chemotherapy  o Eat small frequent meals per day (6-7 meals) rather than 3 large meals  o Care  o Avoid direct sunlight.  o Wear a broad-spectrum sunscreen with an SPF of 30 or higher on any skin exposed to the sun. Re-apply every 2 hours if in the sun and after bathing or sweating.   o For dry skin, use an alcohol-free lotion twice per day, es be taken to lessen any exposure to the medication. Handling Body Waste:   1. Caregivers must wear gloves if exposed to the patient’s blood, urine, stool or vomit. Dispose of the used gloves after each use and wash hands with soap and water.   2. Any sh after treatment, so you may not know if you are at a time in your cycle when you could become pregnant or if you are actually pregnant.

## 2018-06-20 ENCOUNTER — LAB ENCOUNTER (OUTPATIENT)
Dept: LAB | Facility: HOSPITAL | Age: 53
End: 2018-06-20
Attending: PHYSICIAN ASSISTANT
Payer: COMMERCIAL

## 2018-06-20 DIAGNOSIS — C22.0 HEPATOCELLULAR CARCINOMA (HCC): ICD-10-CM

## 2018-06-20 PROCEDURE — 85025 COMPLETE CBC W/AUTO DIFF WBC: CPT

## 2018-06-20 PROCEDURE — 36415 COLL VENOUS BLD VENIPUNCTURE: CPT

## 2018-06-20 PROCEDURE — 84100 ASSAY OF PHOSPHORUS: CPT

## 2018-06-20 PROCEDURE — 80053 COMPREHEN METABOLIC PANEL: CPT

## 2018-06-20 PROCEDURE — 82105 ALPHA-FETOPROTEIN SERUM: CPT

## 2018-06-20 PROCEDURE — 84443 ASSAY THYROID STIM HORMONE: CPT

## 2018-06-21 ENCOUNTER — OFFICE VISIT (OUTPATIENT)
Dept: HEMATOLOGY/ONCOLOGY | Facility: HOSPITAL | Age: 53
End: 2018-06-21
Attending: PHYSICIAN ASSISTANT
Payer: COMMERCIAL

## 2018-06-21 VITALS
RESPIRATION RATE: 16 BRPM | HEART RATE: 86 BPM | DIASTOLIC BLOOD PRESSURE: 67 MMHG | SYSTOLIC BLOOD PRESSURE: 155 MMHG | TEMPERATURE: 99 F

## 2018-06-21 DIAGNOSIS — C22.0 HEPATOCELLULAR CARCINOMA (HCC): Primary | ICD-10-CM

## 2018-06-21 PROCEDURE — 96413 CHEMO IV INFUSION 1 HR: CPT

## 2018-06-21 PROCEDURE — A4216 STERILE WATER/SALINE, 10 ML: HCPCS

## 2018-06-21 RX ORDER — 0.9 % SODIUM CHLORIDE 0.9 %
VIAL (ML) INJECTION
Status: DISCONTINUED
Start: 2018-06-21 | End: 2018-06-21

## 2018-06-21 RX ORDER — SODIUM CHLORIDE 9 MG/ML
INJECTION, SOLUTION INTRAVENOUS
Status: DISCONTINUED
Start: 2018-06-21 | End: 2018-06-21

## 2018-06-21 NOTE — PROGRESS NOTES
Pt here for C 1 D1. Arrives Ambulating independently          Modifications in dose or schedule: No.  Verified patient had  consent signed and had chemo teaching.   PIV placed in right forearm - good blood return noted- flushes without difficulty     Frequ

## 2018-06-21 NOTE — PATIENT INSTRUCTIONS
Cancer Treatment Side Effects (refer to Thierno Miranda 1881 for further information):   Allergic reactions  Constipation  Diarrhea  Eye disorders  Fatigue  Hormone gland changes involving the thyroid, pituitary, adrenal and pancreas  Kidney / Bladder effect office. Diarrhea or Constipation    o Diarrhea:  Imodium A-D use for diarrhea:   Take 2 tabs (4mg) at the first sign of diarrhea; then take 1 tab (2mg) every 2 hours until you have had no diarrhea for at least 12 hours; during the night take 2 tabs (4mg) mood changes, depression, nervousness, difficulty sleeping   • Pain, redness, swelling or blistering at the IV site  • If you go to Emergency Room for any reason or seek medical attention elsewhere    What Phone Number to Call:  [place] [phone] treatment. It is possible that traces of the oral chemotherapy may be present in vaginal fluid or semen for 48 hours after taking. A condom should be used during this time.   Effective birth control should be used throughout treatment to prevent pregnancy

## 2018-06-26 ENCOUNTER — TELEPHONE (OUTPATIENT)
Dept: HEMATOLOGY/ONCOLOGY | Facility: HOSPITAL | Age: 53
End: 2018-06-26

## 2018-06-26 NOTE — TELEPHONE ENCOUNTER
Violtea from [employer] asking if Stephanie Aldana will need to continue on short term disability. Sheila Dominguez can be reached at [phone]. Please advise.  mckenna

## 2018-06-27 NOTE — TELEPHONE ENCOUNTER
Chon Hammonds,    I spoke with Kristopher Evans, who is unsure at this point about whether or not he will be applying for an extension of his disability benefits. He would like to work if possible.   He stated he will talk to his manager and find out if this is possib

## 2018-07-03 ENCOUNTER — NURSE ONLY (OUTPATIENT)
Dept: HEMATOLOGY/ONCOLOGY | Facility: HOSPITAL | Age: 53
End: 2018-07-03
Attending: INTERNAL MEDICINE
Payer: COMMERCIAL

## 2018-07-03 VITALS
RESPIRATION RATE: 18 BRPM | SYSTOLIC BLOOD PRESSURE: 132 MMHG | WEIGHT: 143 LBS | DIASTOLIC BLOOD PRESSURE: 90 MMHG | HEART RATE: 103 BPM | HEIGHT: 67 IN | TEMPERATURE: 98 F | BODY MASS INDEX: 22.44 KG/M2

## 2018-07-03 DIAGNOSIS — Z51.11 CHEMOTHERAPY MANAGEMENT, ENCOUNTER FOR: ICD-10-CM

## 2018-07-03 DIAGNOSIS — C22.0 HEPATOCELLULAR CARCINOMA (HCC): Primary | ICD-10-CM

## 2018-07-03 DIAGNOSIS — D64.9 ANEMIA, UNSPECIFIED TYPE: ICD-10-CM

## 2018-07-03 DIAGNOSIS — B15.9 VIRAL HEPATITIS A WITHOUT HEPATIC COMA: ICD-10-CM

## 2018-07-03 LAB
ALBUMIN SERPL BCP-MCNC: 3.3 G/DL (ref 3.5–4.8)
ALBUMIN/GLOB SERPL: 0.7 {RATIO} (ref 1–2)
ALP SERPL-CCNC: 142 U/L (ref 32–100)
ALT SERPL-CCNC: 23 U/L (ref 17–63)
ANION GAP SERPL CALC-SCNC: 8 MMOL/L (ref 0–18)
AST SERPL-CCNC: 25 U/L (ref 15–41)
BASOPHILS # BLD: 0.1 K/UL (ref 0–0.2)
BASOPHILS NFR BLD: 1 %
BILIRUB SERPL-MCNC: 0.4 MG/DL (ref 0.3–1.2)
BUN SERPL-MCNC: 15 MG/DL (ref 8–20)
BUN/CREAT SERPL: 14.9 (ref 10–20)
CALCIUM SERPL-MCNC: 9.4 MG/DL (ref 8.5–10.5)
CHLORIDE SERPL-SCNC: 100 MMOL/L (ref 95–110)
CO2 SERPL-SCNC: 30 MMOL/L (ref 22–32)
CREAT SERPL-MCNC: 1.01 MG/DL (ref 0.5–1.5)
EOSINOPHIL # BLD: 0.5 K/UL (ref 0–0.7)
EOSINOPHIL NFR BLD: 8 %
ERYTHROCYTE [DISTWIDTH] IN BLOOD BY AUTOMATED COUNT: 17.4 % (ref 11–15)
GLOBULIN PLAS-MCNC: 4.8 G/DL (ref 2.5–3.7)
GLUCOSE SERPL-MCNC: 118 MG/DL (ref 70–99)
HCT VFR BLD AUTO: 36.8 % (ref 41–52)
HGB BLD-MCNC: 11.7 G/DL (ref 13.5–17.5)
LYMPHOCYTES # BLD: 0.7 K/UL (ref 1–4)
LYMPHOCYTES NFR BLD: 10 %
MCH RBC QN AUTO: 26 PG (ref 27–32)
MCHC RBC AUTO-ENTMCNC: 31.8 G/DL (ref 32–37)
MCV RBC AUTO: 81.9 FL (ref 80–100)
MONOCYTES # BLD: 0.7 K/UL (ref 0–1)
MONOCYTES NFR BLD: 10 %
NEUTROPHILS # BLD AUTO: 4.6 K/UL (ref 1.8–7.7)
NEUTROPHILS NFR BLD: 71 %
OSMOLALITY UR CALC.SUM OF ELEC: 288 MOSM/KG (ref 275–295)
PATIENT FASTING: NO
PLATELET # BLD AUTO: 452 K/UL (ref 140–400)
PMV BLD AUTO: 7.2 FL (ref 7.4–10.3)
POTASSIUM SERPL-SCNC: 3.7 MMOL/L (ref 3.3–5.1)
PROT SERPL-MCNC: 8.1 G/DL (ref 5.9–8.4)
RBC # BLD AUTO: 4.49 M/UL (ref 4.5–5.9)
SODIUM SERPL-SCNC: 138 MMOL/L (ref 136–144)
WBC # BLD AUTO: 6.5 K/UL (ref 4–11)

## 2018-07-03 PROCEDURE — 36415 COLL VENOUS BLD VENIPUNCTURE: CPT

## 2018-07-03 PROCEDURE — 99215 OFFICE O/P EST HI 40 MIN: CPT | Performed by: INTERNAL MEDICINE

## 2018-07-03 PROCEDURE — 85025 COMPLETE CBC W/AUTO DIFF WBC: CPT

## 2018-07-03 PROCEDURE — 80053 COMPREHEN METABOLIC PANEL: CPT

## 2018-07-03 PROCEDURE — 82105 ALPHA-FETOPROTEIN SERUM: CPT

## 2018-07-03 NOTE — PROGRESS NOTES
Cancer Center Progress Note    Patient Name: Jeanice Saint   YOB: 1965   Medical Record Number: [id number]   Attending Physician: Keo Crowe M.D.      Chief Complaint:  [address] hx of hepatitis A    History of Present Illness:  Cancer  50 History:  12/29/2017: COLONOSCOPY N/A      Comment: Procedure: COLONOSCOPY;  Surgeon:                Dileep Lim MD;  Location: [address] [place] [address]                ENDOSCOPY    Family History:  Family History   Problem Relation Age of Onset   • Colon Cancer Pater HGB  11.7*   PLT  452*   NE  4.6           Lab Results  Component Value Date    (H) 07/03/2018   BUN 15 07/03/2018   BUNCREA 14.9 07/03/2018   CREATSERUM 1.01 07/03/2018   ANIONGAP 8 07/03/2018   GFRNAA >60 07/03/2018   GFRAA >60 07/03/2018   CA 9 reviewed and discussed with the pateint and family.      Ngozi Carwford MD

## 2018-07-04 LAB — AFP-TM SERPL-MCNC: 318.4 NG/ML (ref 0–8.9)

## 2018-07-05 ENCOUNTER — OFFICE VISIT (OUTPATIENT)
Dept: HEMATOLOGY/ONCOLOGY | Facility: HOSPITAL | Age: 53
End: 2018-07-05
Attending: INTERNAL MEDICINE
Payer: COMMERCIAL

## 2018-07-05 VITALS
RESPIRATION RATE: 18 BRPM | TEMPERATURE: 98 F | HEART RATE: 108 BPM | DIASTOLIC BLOOD PRESSURE: 85 MMHG | SYSTOLIC BLOOD PRESSURE: 137 MMHG

## 2018-07-05 DIAGNOSIS — C22.0 HEPATOCELLULAR CARCINOMA (HCC): Primary | ICD-10-CM

## 2018-07-05 PROCEDURE — 96413 CHEMO IV INFUSION 1 HR: CPT

## 2018-07-05 RX ORDER — SODIUM CHLORIDE 9 MG/ML
INJECTION, SOLUTION INTRAVENOUS
Status: DISCONTINUED
Start: 2018-07-05 | End: 2018-07-05

## 2018-07-05 NOTE — PROGRESS NOTES
Pt here for C  2D 1.   Arrives Ambulating independently, accompanied by Other self           Modifications in dose or schedule: No     Frequency of blood return and site check throughout administration: Prior to administration and At completion of therapy

## 2018-07-11 ENCOUNTER — HOSPITAL ENCOUNTER (OUTPATIENT)
Dept: INTERVENTIONAL RADIOLOGY/VASCULAR | Facility: HOSPITAL | Age: 53
Discharge: HOME | End: 2018-07-11
Attending: RADIOLOGY | Admitting: RADIOLOGY
Payer: COMMERCIAL

## 2018-07-11 ENCOUNTER — HOSPITAL ENCOUNTER (OUTPATIENT)
Dept: NUCLEAR MEDICINE | Facility: HOSPITAL | Age: 53
Discharge: HOME | End: 2018-07-11
Attending: RADIOLOGY
Payer: COMMERCIAL

## 2018-07-11 VITALS
HEIGHT: 67 IN | BODY MASS INDEX: 21.48 KG/M2 | HEART RATE: 78 BPM | WEIGHT: 136.88 LBS | DIASTOLIC BLOOD PRESSURE: 82 MMHG | RESPIRATION RATE: 17 BRPM | SYSTOLIC BLOOD PRESSURE: 117 MMHG | OXYGEN SATURATION: 99 %

## 2018-07-11 DIAGNOSIS — C22.0 HEPATOCELLULAR CARCINOMA (HCC): ICD-10-CM

## 2018-07-11 PROCEDURE — B41J1ZZ FLUOROSCOPY OF OTHER LOWER ARTERIES USING LOW OSMOLAR CONTRAST: ICD-10-PCS | Performed by: RADIOLOGY

## 2018-07-11 PROCEDURE — 04L33DZ OCCLUSION OF HEPATIC ARTERY WITH INTRALUMINAL DEVICE, PERCUTANEOUS APPROACH: ICD-10-PCS | Performed by: RADIOLOGY

## 2018-07-11 PROCEDURE — 99152 MOD SED SAME PHYS/QHP 5/>YRS: CPT

## 2018-07-11 PROCEDURE — 79445 NUCLEAR RX INTRA-ARTERIAL: CPT

## 2018-07-11 PROCEDURE — 75774 ARTERY X-RAY EACH VESSEL: CPT

## 2018-07-11 PROCEDURE — 37243 VASC EMBOLIZE/OCCLUDE ORGAN: CPT

## 2018-07-11 PROCEDURE — 99153 MOD SED SAME PHYS/QHP EA: CPT

## 2018-07-11 PROCEDURE — 36247 INS CATH ABD/L-EXT ART 3RD: CPT

## 2018-07-11 PROCEDURE — 77790 RADIATION HANDLING: CPT | Performed by: RADIOLOGY

## 2018-07-11 PROCEDURE — 75726 ARTERY X-RAYS ABDOMEN: CPT

## 2018-07-11 PROCEDURE — 36248 INS CATH ABD/L-EXT ART ADDL: CPT

## 2018-07-11 RX ORDER — LIDOCAINE HYDROCHLORIDE 20 MG/ML
INJECTION, SOLUTION EPIDURAL; INFILTRATION; INTRACAUDAL; PERINEURAL
Status: COMPLETED
Start: 2018-07-11 | End: 2018-07-11

## 2018-07-11 RX ORDER — MIDAZOLAM HYDROCHLORIDE 1 MG/ML
INJECTION INTRAMUSCULAR; INTRAVENOUS
Status: DISCONTINUED
Start: 2018-07-11 | End: 2018-07-11 | Stop reason: WASHOUT

## 2018-07-11 RX ORDER — SODIUM CHLORIDE 9 MG/ML
INJECTION, SOLUTION INTRAVENOUS ONCE
Status: DISCONTINUED | OUTPATIENT
Start: 2018-07-11 | End: 2018-07-11

## 2018-07-11 RX ORDER — NITROGLYCERIN 20 MG/100ML
INJECTION INTRAVENOUS
Status: COMPLETED
Start: 2018-07-11 | End: 2018-07-11

## 2018-07-11 RX ORDER — SODIUM CHLORIDE 9 MG/ML
INJECTION, SOLUTION INTRAVENOUS
Status: DISCONTINUED
Start: 2018-07-11 | End: 2018-07-11

## 2018-07-11 RX ORDER — SODIUM CHLORIDE 9 MG/ML
INJECTION, SOLUTION INTRAVENOUS
Status: COMPLETED
Start: 2018-07-11 | End: 2018-07-11

## 2018-07-11 RX ORDER — ONDANSETRON 4 MG/1
4 TABLET, FILM COATED ORAL EVERY 8 HOURS PRN
Qty: 20 TABLET | Refills: 0 | Status: SHIPPED | OUTPATIENT
Start: 2018-07-11 | End: 2019-01-01

## 2018-07-11 RX ORDER — ONDANSETRON 2 MG/ML
4 INJECTION INTRAMUSCULAR; INTRAVENOUS ONCE
Status: COMPLETED | OUTPATIENT
Start: 2018-07-11 | End: 2018-07-11

## 2018-07-11 RX ORDER — MIDAZOLAM HYDROCHLORIDE 1 MG/ML
INJECTION INTRAMUSCULAR; INTRAVENOUS
Status: COMPLETED
Start: 2018-07-11 | End: 2018-07-11

## 2018-07-11 RX ORDER — ONDANSETRON 2 MG/ML
INJECTION INTRAMUSCULAR; INTRAVENOUS
Status: DISCONTINUED
Start: 2018-07-11 | End: 2018-07-11

## 2018-07-11 RX ADMIN — ONDANSETRON 4 MG: 2 INJECTION INTRAMUSCULAR; INTRAVENOUS at 14:08:00

## 2018-07-11 NOTE — H&P
[address] [facility] Patient Status:  Outpatient in a Bed    1965 MRN [id number]   Location [facility] Attending Jason Bryson MD   Hosp Day # 0 ANTONY Kaur

## 2018-07-11 NOTE — PROCEDURES
[facility]OSP - [facility]  Procedure Note    [address] Patient Status:  Outpatient in a Bed    1965 MRN [id number]   Location [facility] Attending Teja Chowdhury MD   Hosp Day # 0 PCP Marcelina Morocho MD

## 2018-07-11 NOTE — PLAN OF CARE
Pt ambulated/voided/took liquids and crackers C/O mild nausea-treatment given Site soft dry and intact after ambulation

## 2018-07-18 ENCOUNTER — LABORATORY ENCOUNTER (OUTPATIENT)
Dept: HEMATOLOGY/ONCOLOGY | Facility: HOSPITAL | Age: 53
End: 2018-07-18
Attending: INTERNAL MEDICINE
Payer: COMMERCIAL

## 2018-07-18 VITALS
HEIGHT: 67 IN | RESPIRATION RATE: 16 BRPM | TEMPERATURE: 98 F | HEART RATE: 99 BPM | SYSTOLIC BLOOD PRESSURE: 112 MMHG | BODY MASS INDEX: 22.29 KG/M2 | WEIGHT: 142 LBS | DIASTOLIC BLOOD PRESSURE: 72 MMHG

## 2018-07-18 VITALS
SYSTOLIC BLOOD PRESSURE: 112 MMHG | DIASTOLIC BLOOD PRESSURE: 72 MMHG | HEART RATE: 99 BPM | RESPIRATION RATE: 16 BRPM | TEMPERATURE: 98 F

## 2018-07-18 DIAGNOSIS — C22.0 HEPATOCELLULAR CARCINOMA (HCC): Primary | ICD-10-CM

## 2018-07-18 DIAGNOSIS — B15.9 VIRAL HEPATITIS A WITHOUT HEPATIC COMA: ICD-10-CM

## 2018-07-18 DIAGNOSIS — Z51.11 CHEMOTHERAPY MANAGEMENT, ENCOUNTER FOR: ICD-10-CM

## 2018-07-18 DIAGNOSIS — C22.0 HEPATOCELLULAR CARCINOMA (HCC): ICD-10-CM

## 2018-07-18 DIAGNOSIS — D64.9 ANEMIA, UNSPECIFIED TYPE: ICD-10-CM

## 2018-07-18 LAB
AFP-TM SERPL-MCNC: 335.3 NG/ML (ref 0–8.9)
ALBUMIN SERPL BCP-MCNC: 3.2 G/DL (ref 3.5–4.8)
ALBUMIN/GLOB SERPL: 0.6 {RATIO} (ref 1–2)
ALP SERPL-CCNC: 145 U/L (ref 32–100)
ALT SERPL-CCNC: 31 U/L (ref 17–63)
ANION GAP SERPL CALC-SCNC: 8 MMOL/L (ref 0–18)
AST SERPL-CCNC: 37 U/L (ref 15–41)
BASOPHILS # BLD: 0.1 K/UL (ref 0–0.2)
BASOPHILS NFR BLD: 1 %
BILIRUB SERPL-MCNC: 0.5 MG/DL (ref 0.3–1.2)
BUN SERPL-MCNC: 14 MG/DL (ref 8–20)
BUN/CREAT SERPL: 13.5 (ref 10–20)
CALCIUM SERPL-MCNC: 9.2 MG/DL (ref 8.5–10.5)
CHLORIDE SERPL-SCNC: 100 MMOL/L (ref 95–110)
CO2 SERPL-SCNC: 27 MMOL/L (ref 22–32)
CREAT SERPL-MCNC: 1.04 MG/DL (ref 0.5–1.5)
EOSINOPHIL # BLD: 0.7 K/UL (ref 0–0.7)
EOSINOPHIL NFR BLD: 7 %
ERYTHROCYTE [DISTWIDTH] IN BLOOD BY AUTOMATED COUNT: 15.9 % (ref 11–15)
GLOBULIN PLAS-MCNC: 5 G/DL (ref 2.5–3.7)
GLUCOSE SERPL-MCNC: 165 MG/DL (ref 70–99)
HCT VFR BLD AUTO: 36.6 % (ref 41–52)
HGB BLD-MCNC: 11.8 G/DL (ref 13.5–17.5)
LYMPHOCYTES # BLD: 0.3 K/UL (ref 1–4)
LYMPHOCYTES NFR BLD: 3 %
MCH RBC QN AUTO: 26.2 PG (ref 27–32)
MCHC RBC AUTO-ENTMCNC: 32.2 G/DL (ref 32–37)
MCV RBC AUTO: 81.3 FL (ref 80–100)
MONOCYTES # BLD: 1 K/UL (ref 0–1)
MONOCYTES NFR BLD: 10 %
NEUTROPHILS # BLD AUTO: 8.3 K/UL (ref 1.8–7.7)
NEUTROPHILS NFR BLD: 80 %
OSMOLALITY UR CALC.SUM OF ELEC: 284 MOSM/KG (ref 275–295)
PATIENT FASTING: NO
PLATELET # BLD AUTO: 583 K/UL (ref 140–400)
PMV BLD AUTO: 7.2 FL (ref 7.4–10.3)
POTASSIUM SERPL-SCNC: 4.4 MMOL/L (ref 3.3–5.1)
PROT SERPL-MCNC: 8.2 G/DL (ref 5.9–8.4)
RBC # BLD AUTO: 4.51 M/UL (ref 4.5–5.9)
SODIUM SERPL-SCNC: 135 MMOL/L (ref 136–144)
WBC # BLD AUTO: 10.4 K/UL (ref 4–11)

## 2018-07-18 PROCEDURE — 99215 OFFICE O/P EST HI 40 MIN: CPT | Performed by: INTERNAL MEDICINE

## 2018-07-18 PROCEDURE — 36415 COLL VENOUS BLD VENIPUNCTURE: CPT

## 2018-07-18 PROCEDURE — 80053 COMPREHEN METABOLIC PANEL: CPT

## 2018-07-18 PROCEDURE — 82105 ALPHA-FETOPROTEIN SERUM: CPT

## 2018-07-18 PROCEDURE — 85025 COMPLETE CBC W/AUTO DIFF WBC: CPT

## 2018-07-18 NOTE — PROGRESS NOTES
Cancer Center Progress Note    Patient Name: Yady Ospina   YOB: 1965   Medical Record Number: [id number]   Attending Physician: Edgar Eden M.D.      Chief Complaint:  [address] hx of hepatitis A    History of Present Illness:  Cancer  50 History:  12/29/2017: COLONOSCOPY N/A      Comment: Procedure: COLONOSCOPY;  Surgeon:                Thuy Ponce MD;  Location: [address] [place] [address]                ENDOSCOPY    Family History:  Family History   Problem Relation Age of Onset   • Colon Cancer Pater lymphadenopathy   Chest: Symmetric expansion, nonlabored breathing  Cardiovascular: Regular with palpable distal pulses   Abdomen: Soft, non tender. Extremities: No edema. Neurological: 5/5 motor x4.         Laboratory:  Recent Labs   Lab  07/18/18   103 likely secondary to sorafenib. Monitor now off sorafenib    Risk assessment high hepatocellular carcinoma chemotherapy encounter    The patient's emotional well being was assessed and resources were discussed.   Appropriate resources were reviewed and disc

## 2018-07-19 ENCOUNTER — OFFICE VISIT (OUTPATIENT)
Dept: HEMATOLOGY/ONCOLOGY | Facility: HOSPITAL | Age: 53
End: 2018-07-19
Attending: INTERNAL MEDICINE
Payer: COMMERCIAL

## 2018-07-19 VITALS
HEART RATE: 69 BPM | DIASTOLIC BLOOD PRESSURE: 64 MMHG | SYSTOLIC BLOOD PRESSURE: 132 MMHG | TEMPERATURE: 98 F | RESPIRATION RATE: 16 BRPM

## 2018-07-19 DIAGNOSIS — C22.0 HEPATOCELLULAR CARCINOMA (HCC): Primary | ICD-10-CM

## 2018-07-19 PROCEDURE — 96413 CHEMO IV INFUSION 1 HR: CPT

## 2018-07-19 RX ORDER — SODIUM CHLORIDE 9 MG/ML
INJECTION, SOLUTION INTRAVENOUS
Status: DISCONTINUED
Start: 2018-07-19 | End: 2018-07-19

## 2018-07-19 NOTE — PROGRESS NOTES
Pt here for C  3D 1. Arrives Ambulating independently, accompanied by Other self           Modifications in dose or schedule: No    Opdivo given and tolerated well. Pt has occasional nausea since his liver procedure was done.  He finishes Cipro today and f

## 2018-08-02 ENCOUNTER — NURSE ONLY (OUTPATIENT)
Dept: HEMATOLOGY/ONCOLOGY | Facility: HOSPITAL | Age: 53
End: 2018-08-02
Attending: INTERNAL MEDICINE
Payer: COMMERCIAL

## 2018-08-02 VITALS
RESPIRATION RATE: 18 BRPM | SYSTOLIC BLOOD PRESSURE: 125 MMHG | WEIGHT: 140 LBS | TEMPERATURE: 98 F | DIASTOLIC BLOOD PRESSURE: 75 MMHG | BODY MASS INDEX: 21.97 KG/M2 | HEIGHT: 67 IN | HEART RATE: 89 BPM

## 2018-08-02 DIAGNOSIS — C22.0 HEPATOCELLULAR CARCINOMA (HCC): Primary | ICD-10-CM

## 2018-08-02 DIAGNOSIS — B15.9 VIRAL HEPATITIS A WITHOUT HEPATIC COMA: ICD-10-CM

## 2018-08-02 DIAGNOSIS — Z51.11 CHEMOTHERAPY MANAGEMENT, ENCOUNTER FOR: ICD-10-CM

## 2018-08-02 DIAGNOSIS — D64.9 ANEMIA, UNSPECIFIED TYPE: ICD-10-CM

## 2018-08-02 LAB
ALBUMIN SERPL BCP-MCNC: 2.8 G/DL (ref 3.5–4.8)
ALBUMIN/GLOB SERPL: 0.5 {RATIO} (ref 1–2)
ALP SERPL-CCNC: 141 U/L (ref 32–100)
ALT SERPL-CCNC: 26 U/L (ref 17–63)
ANION GAP SERPL CALC-SCNC: 8 MMOL/L (ref 0–18)
AST SERPL-CCNC: 25 U/L (ref 15–41)
BASOPHILS # BLD: 0.1 K/UL (ref 0–0.2)
BASOPHILS NFR BLD: 1 %
BILIRUB SERPL-MCNC: 0.2 MG/DL (ref 0.3–1.2)
BUN SERPL-MCNC: 14 MG/DL (ref 8–20)
BUN/CREAT SERPL: 17.5 (ref 10–20)
CALCIUM SERPL-MCNC: 8.9 MG/DL (ref 8.5–10.5)
CHLORIDE SERPL-SCNC: 102 MMOL/L (ref 95–110)
CO2 SERPL-SCNC: 28 MMOL/L (ref 22–32)
CREAT SERPL-MCNC: 0.8 MG/DL (ref 0.5–1.5)
EOSINOPHIL # BLD: 0.7 K/UL (ref 0–0.7)
EOSINOPHIL NFR BLD: 9 %
ERYTHROCYTE [DISTWIDTH] IN BLOOD BY AUTOMATED COUNT: 15.9 % (ref 11–15)
GLOBULIN PLAS-MCNC: 5.1 G/DL (ref 2.5–3.7)
GLUCOSE SERPL-MCNC: 196 MG/DL (ref 70–99)
HCT VFR BLD AUTO: 33.8 % (ref 41–52)
HGB BLD-MCNC: 10.8 G/DL (ref 13.5–17.5)
IRON SATN MFR SERPL: 7 % (ref 20–55)
IRON SERPL-MCNC: 15 MCG/DL (ref 45–182)
LYMPHOCYTES # BLD: 0.3 K/UL (ref 1–4)
LYMPHOCYTES NFR BLD: 4 %
MCH RBC QN AUTO: 25.6 PG (ref 27–32)
MCHC RBC AUTO-ENTMCNC: 31.9 G/DL (ref 32–37)
MCV RBC AUTO: 80.3 FL (ref 80–100)
MONOCYTES # BLD: 0.6 K/UL (ref 0–1)
MONOCYTES NFR BLD: 7 %
NEUTROPHILS # BLD AUTO: 6.8 K/UL (ref 1.8–7.7)
NEUTROPHILS NFR BLD: 80 %
OSMOLALITY UR CALC.SUM OF ELEC: 292 MOSM/KG (ref 275–295)
PATIENT FASTING: NO
PLATELET # BLD AUTO: 516 K/UL (ref 140–400)
PMV BLD AUTO: 7.4 FL (ref 7.4–10.3)
POTASSIUM SERPL-SCNC: 3.7 MMOL/L (ref 3.3–5.1)
PROT SERPL-MCNC: 7.9 G/DL (ref 5.9–8.4)
RBC # BLD AUTO: 4.21 M/UL (ref 4.5–5.9)
SODIUM SERPL-SCNC: 138 MMOL/L (ref 136–144)
TIBC SERPL-MCNC: 206 MCG/DL (ref 228–428)
TRANSFERRIN SERPL-MCNC: 156 MG/DL (ref 180–329)
TSH SERPL-ACNC: 0.77 UIU/ML (ref 0.45–5.33)
WBC # BLD AUTO: 8.6 K/UL (ref 4–11)

## 2018-08-02 PROCEDURE — 83540 ASSAY OF IRON: CPT

## 2018-08-02 PROCEDURE — A4216 STERILE WATER/SALINE, 10 ML: HCPCS

## 2018-08-02 PROCEDURE — 84443 ASSAY THYROID STIM HORMONE: CPT

## 2018-08-02 PROCEDURE — 85025 COMPLETE CBC W/AUTO DIFF WBC: CPT

## 2018-08-02 PROCEDURE — 84466 ASSAY OF TRANSFERRIN: CPT

## 2018-08-02 PROCEDURE — 99215 OFFICE O/P EST HI 40 MIN: CPT | Performed by: INTERNAL MEDICINE

## 2018-08-02 PROCEDURE — 80053 COMPREHEN METABOLIC PANEL: CPT

## 2018-08-02 PROCEDURE — 96413 CHEMO IV INFUSION 1 HR: CPT

## 2018-08-02 PROCEDURE — 82105 ALPHA-FETOPROTEIN SERUM: CPT

## 2018-08-02 RX ORDER — 0.9 % SODIUM CHLORIDE 0.9 %
VIAL (ML) INJECTION
Status: DISCONTINUED
Start: 2018-08-02 | End: 2018-08-02

## 2018-08-02 RX ORDER — SODIUM CHLORIDE 9 MG/ML
INJECTION, SOLUTION INTRAVENOUS
Status: DISPENSED
Start: 2018-08-02 | End: 2018-08-03

## 2018-08-02 RX ORDER — 0.9 % SODIUM CHLORIDE 0.9 %
VIAL (ML) INJECTION
Status: DISPENSED
Start: 2018-08-02 | End: 2018-08-03

## 2018-08-02 NOTE — PROGRESS NOTES
Cancer Center Progress Note    Patient Name: Michael Poster   YOB: 1965   Medical Record Number: [id number]   Attending Physician: Maria Rashid M.D.      Chief Complaint:  [address] hx of hepatitis A    History of Present Illness:  Cancer  50 COLONOSCOPY N/A      Comment: Procedure: COLONOSCOPY;  Surgeon:                Vianey Keller MD;  Location: [address] [place] [address]                ENDOSCOPY    Family History:  Family History   Problem Relation Age of Onset   • Colon Cancer Paternal Grandfather Extremities: No edema. Neurological: 5/5 motor x4.         Laboratory:  Recent Labs   Lab  08/02/18   1237   WBC  8.6   HGB  10.8*   PLT  516*   NE  6.8           Lab Results  Component Value Date    (H) 07/18/2018   BUN 14 07/18/2018   BUNCREA 13 assessed and resources were discussed. Appropriate resources were reviewed and discussed with the pateint and family.      Vel Kaplan MD

## 2018-08-02 NOTE — PROGRESS NOTES
Pt here for C4D1 Opdivo. Arrives Ambulating independently, accompanied by Other self            Modifications in dose or schedule:  No     Frequency of blood return and site check throughout administration: Prior to administration and At completion of ther

## 2018-08-03 ENCOUNTER — TELEPHONE (OUTPATIENT)
Dept: HEMATOLOGY/ONCOLOGY | Facility: HOSPITAL | Age: 53
End: 2018-08-03

## 2018-08-03 PROBLEM — D50.9 IRON DEFICIENCY ANEMIA: Status: ACTIVE | Noted: 2018-08-03

## 2018-08-03 PROBLEM — K90.9 MALABSORPTION: Status: ACTIVE | Noted: 2018-08-03

## 2018-08-03 LAB — AFP-TM SERPL-MCNC: 246.6 NG/ML (ref 0–8.9)

## 2018-08-03 NOTE — TELEPHONE ENCOUNTER
Philipp Bryantkward, I let him know that Dr Shasta Mcdonnell ordered feraheme x2 for him for his low iron level. He verbalized understanding.

## 2018-08-09 ENCOUNTER — OFFICE VISIT (OUTPATIENT)
Dept: HEMATOLOGY/ONCOLOGY | Facility: HOSPITAL | Age: 53
End: 2018-08-09
Attending: INTERNAL MEDICINE
Payer: COMMERCIAL

## 2018-08-09 ENCOUNTER — HOSPITAL ENCOUNTER (OUTPATIENT)
Dept: MRI IMAGING | Facility: HOSPITAL | Age: 53
Discharge: HOME | End: 2018-08-09
Attending: RADIOLOGY
Payer: COMMERCIAL

## 2018-08-09 VITALS
RESPIRATION RATE: 18 BRPM | HEART RATE: 81 BPM | SYSTOLIC BLOOD PRESSURE: 117 MMHG | DIASTOLIC BLOOD PRESSURE: 73 MMHG | TEMPERATURE: 98 F

## 2018-08-09 DIAGNOSIS — C22.0 HEPATOCELLULAR CARCINOMA (HCC): ICD-10-CM

## 2018-08-09 DIAGNOSIS — D50.9 IRON DEFICIENCY ANEMIA: Primary | ICD-10-CM

## 2018-08-09 DIAGNOSIS — K90.9 MALABSORPTION: ICD-10-CM

## 2018-08-09 PROCEDURE — A9576 INJ PROHANCE MULTIPACK: HCPCS | Performed by: RADIOLOGY

## 2018-08-09 PROCEDURE — A4216 STERILE WATER/SALINE, 10 ML: HCPCS

## 2018-08-09 PROCEDURE — 96374 THER/PROPH/DIAG INJ IV PUSH: CPT

## 2018-08-09 PROCEDURE — 74183 MRI ABD W/O CNTR FLWD CNTR: CPT | Performed by: RADIOLOGY

## 2018-08-09 RX ORDER — 0.9 % SODIUM CHLORIDE 0.9 %
VIAL (ML) INJECTION
Status: DISCONTINUED
Start: 2018-08-09 | End: 2018-08-09

## 2018-08-09 RX ORDER — SODIUM CHLORIDE 9 MG/ML
INJECTION, SOLUTION INTRAVENOUS
Status: DISCONTINUED
Start: 2018-08-09 | End: 2018-08-09

## 2018-08-09 NOTE — PROGRESS NOTES
Pt to infusion for 1 of 2 feraheme infusions. Pt states he feels well overall. Denies any shortness of breath, dizziness or light headedness. Pt positive for fatigue but no more so that per his baseline. Pt arrived with PIV in arm from MRI.   Line flush

## 2018-08-16 ENCOUNTER — OFFICE VISIT (OUTPATIENT)
Dept: HEMATOLOGY/ONCOLOGY | Facility: HOSPITAL | Age: 53
End: 2018-08-16
Attending: INTERNAL MEDICINE
Payer: COMMERCIAL

## 2018-08-16 VITALS
BODY MASS INDEX: 22 KG/M2 | SYSTOLIC BLOOD PRESSURE: 149 MMHG | DIASTOLIC BLOOD PRESSURE: 83 MMHG | RESPIRATION RATE: 18 BRPM | HEART RATE: 83 BPM | TEMPERATURE: 98 F | WEIGHT: 143 LBS

## 2018-08-16 VITALS
DIASTOLIC BLOOD PRESSURE: 83 MMHG | BODY MASS INDEX: 22.44 KG/M2 | RESPIRATION RATE: 18 BRPM | WEIGHT: 143 LBS | SYSTOLIC BLOOD PRESSURE: 149 MMHG | TEMPERATURE: 98 F | HEART RATE: 83 BPM | HEIGHT: 67 IN

## 2018-08-16 DIAGNOSIS — C22.0 HEPATOCELLULAR CARCINOMA (HCC): Primary | ICD-10-CM

## 2018-08-16 DIAGNOSIS — Z51.11 CHEMOTHERAPY MANAGEMENT, ENCOUNTER FOR: ICD-10-CM

## 2018-08-16 DIAGNOSIS — D50.9 IRON DEFICIENCY ANEMIA, UNSPECIFIED IRON DEFICIENCY ANEMIA TYPE: Primary | ICD-10-CM

## 2018-08-16 DIAGNOSIS — C22.0 HEPATOCELLULAR CARCINOMA (HCC): ICD-10-CM

## 2018-08-16 DIAGNOSIS — B15.9 VIRAL HEPATITIS A WITHOUT HEPATIC COMA: ICD-10-CM

## 2018-08-16 DIAGNOSIS — D50.9 IRON DEFICIENCY ANEMIA: Primary | ICD-10-CM

## 2018-08-16 DIAGNOSIS — K90.9 MALABSORPTION: ICD-10-CM

## 2018-08-16 LAB
ALBUMIN SERPL BCP-MCNC: 3.3 G/DL (ref 3.5–4.8)
ALBUMIN/GLOB SERPL: 0.7 {RATIO} (ref 1–2)
ALP SERPL-CCNC: 136 U/L (ref 32–100)
ALT SERPL-CCNC: 26 U/L (ref 17–63)
ANION GAP SERPL CALC-SCNC: 7 MMOL/L (ref 0–18)
AST SERPL-CCNC: 27 U/L (ref 15–41)
BASOPHILS # BLD: 0.1 K/UL (ref 0–0.2)
BASOPHILS NFR BLD: 1 %
BILIRUB SERPL-MCNC: 0.3 MG/DL (ref 0.3–1.2)
BUN SERPL-MCNC: 13 MG/DL (ref 8–20)
BUN/CREAT SERPL: 16.5 (ref 10–20)
CALCIUM SERPL-MCNC: 9.2 MG/DL (ref 8.5–10.5)
CHLORIDE SERPL-SCNC: 104 MMOL/L (ref 95–110)
CO2 SERPL-SCNC: 28 MMOL/L (ref 22–32)
CREAT SERPL-MCNC: 0.79 MG/DL (ref 0.5–1.5)
EOSINOPHIL # BLD: 0.8 K/UL (ref 0–0.7)
EOSINOPHIL NFR BLD: 10 %
ERYTHROCYTE [DISTWIDTH] IN BLOOD BY AUTOMATED COUNT: 16.7 % (ref 11–15)
GLOBULIN PLAS-MCNC: 5 G/DL (ref 2.5–3.7)
GLUCOSE SERPL-MCNC: 92 MG/DL (ref 70–99)
HCT VFR BLD AUTO: 35.7 % (ref 41–52)
HGB BLD-MCNC: 11.2 G/DL (ref 13.5–17.5)
LYMPHOCYTES # BLD: 0.3 K/UL (ref 1–4)
LYMPHOCYTES NFR BLD: 4 %
MCH RBC QN AUTO: 25.6 PG (ref 27–32)
MCHC RBC AUTO-ENTMCNC: 31.5 G/DL (ref 32–37)
MCV RBC AUTO: 81.3 FL (ref 80–100)
MONOCYTES # BLD: 0.6 K/UL (ref 0–1)
MONOCYTES NFR BLD: 8 %
NEUTROPHILS # BLD AUTO: 6.1 K/UL (ref 1.8–7.7)
NEUTROPHILS NFR BLD: 77 %
OSMOLALITY UR CALC.SUM OF ELEC: 288 MOSM/KG (ref 275–295)
PATIENT FASTING: NO
PLATELET # BLD AUTO: 512 K/UL (ref 140–400)
PMV BLD AUTO: 7.4 FL (ref 7.4–10.3)
POTASSIUM SERPL-SCNC: 3.8 MMOL/L (ref 3.3–5.1)
PROT SERPL-MCNC: 8.3 G/DL (ref 5.9–8.4)
RBC # BLD AUTO: 4.38 M/UL (ref 4.5–5.9)
SODIUM SERPL-SCNC: 139 MMOL/L (ref 136–144)
WBC # BLD AUTO: 7.9 K/UL (ref 4–11)

## 2018-08-16 PROCEDURE — 80053 COMPREHEN METABOLIC PANEL: CPT

## 2018-08-16 PROCEDURE — 99215 OFFICE O/P EST HI 40 MIN: CPT | Performed by: INTERNAL MEDICINE

## 2018-08-16 PROCEDURE — 82105 ALPHA-FETOPROTEIN SERUM: CPT

## 2018-08-16 PROCEDURE — 96413 CHEMO IV INFUSION 1 HR: CPT

## 2018-08-16 PROCEDURE — 96375 TX/PRO/DX INJ NEW DRUG ADDON: CPT

## 2018-08-16 PROCEDURE — A4216 STERILE WATER/SALINE, 10 ML: HCPCS

## 2018-08-16 PROCEDURE — 85025 COMPLETE CBC W/AUTO DIFF WBC: CPT

## 2018-08-16 RX ORDER — 0.9 % SODIUM CHLORIDE 0.9 %
VIAL (ML) INJECTION
Status: DISCONTINUED
Start: 2018-08-16 | End: 2018-08-16

## 2018-08-16 RX ORDER — SODIUM CHLORIDE 9 MG/ML
INJECTION, SOLUTION INTRAVENOUS
Status: DISCONTINUED
Start: 2018-08-16 | End: 2018-08-16

## 2018-08-16 NOTE — PROGRESS NOTES
Cancer Center Progress Note    Patient Name: Yen Cabral   YOB: 1965   Medical Record Number: [id number]   Attending Physician: Roberto Carlos Metcalf M.D.      Chief Complaint:  [address] hx of hepatitis A    History of Present Illness:  Cancer  50 COLONOSCOPY N/A      Comment: Procedure: COLONOSCOPY;  Surgeon:                Emigdio Parekh MD;  Location: [facility]    Family History:  Family History   Problem Relation Age of Onset   • Colon Cancer Paternal Grandfather Abdomen: Soft, non tender. Extremities: No edema. Neurological: 5/5 motor x4.         Laboratory:  Recent Labs   Lab  08/02/18   1237  08/16/18   1251   WBC  8.6  7.9   HGB  10.8*  11.2*   PLT  516*  512*   NE  6.8   --            Lab Results  Componen and family.      Lena Hensley MD

## 2018-08-16 NOTE — PROGRESS NOTES
Pt here for C5D1 Opdivo + 2nd dose of feraheme. Per notes from  today \"Continue with cycle #5 of nivolumab. Evidence of some progression in his course on immunotherapy. He will receive Y 90 again.   If his next liver imaging shows further progress

## 2018-08-17 LAB — AFP-TM SERPL-MCNC: 468.7 NG/ML (ref 0–8.9)

## 2018-08-27 ENCOUNTER — HOSPITAL ENCOUNTER (OUTPATIENT)
Dept: NUCLEAR MEDICINE | Facility: HOSPITAL | Age: 53
Discharge: HOME | End: 2018-08-27
Attending: RADIOLOGY
Payer: COMMERCIAL

## 2018-08-27 ENCOUNTER — HOSPITAL ENCOUNTER (OUTPATIENT)
Dept: INTERVENTIONAL RADIOLOGY/VASCULAR | Facility: HOSPITAL | Age: 53
Discharge: HOME | End: 2018-08-27
Attending: RADIOLOGY | Admitting: RADIOLOGY
Payer: COMMERCIAL

## 2018-08-27 VITALS
HEART RATE: 65 BPM | OXYGEN SATURATION: 99 % | RESPIRATION RATE: 11 BRPM | SYSTOLIC BLOOD PRESSURE: 113 MMHG | DIASTOLIC BLOOD PRESSURE: 82 MMHG

## 2018-08-27 DIAGNOSIS — C22.0 HEPATOCELLULAR CARCINOMA (HCC): ICD-10-CM

## 2018-08-27 PROCEDURE — 77790 RADIATION HANDLING: CPT | Performed by: RADIOLOGY

## 2018-08-27 PROCEDURE — 37243 VASC EMBOLIZE/OCCLUDE ORGAN: CPT

## 2018-08-27 PROCEDURE — 75774 ARTERY X-RAY EACH VESSEL: CPT

## 2018-08-27 PROCEDURE — 04L33DZ OCCLUSION OF HEPATIC ARTERY WITH INTRALUMINAL DEVICE, PERCUTANEOUS APPROACH: ICD-10-PCS | Performed by: RADIOLOGY

## 2018-08-27 PROCEDURE — 99152 MOD SED SAME PHYS/QHP 5/>YRS: CPT

## 2018-08-27 PROCEDURE — 79445 NUCLEAR RX INTRA-ARTERIAL: CPT

## 2018-08-27 PROCEDURE — 99153 MOD SED SAME PHYS/QHP EA: CPT

## 2018-08-27 PROCEDURE — 75726 ARTERY X-RAYS ABDOMEN: CPT

## 2018-08-27 PROCEDURE — 36247 INS CATH ABD/L-EXT ART 3RD: CPT

## 2018-08-27 RX ORDER — SODIUM CHLORIDE 9 MG/ML
INJECTION, SOLUTION INTRAVENOUS
Status: COMPLETED
Start: 2018-08-27 | End: 2018-08-27

## 2018-08-27 RX ORDER — SODIUM CHLORIDE 9 MG/ML
INJECTION, SOLUTION INTRAVENOUS
Status: DISCONTINUED
Start: 2018-08-27 | End: 2018-08-27

## 2018-08-27 RX ORDER — MIDAZOLAM HYDROCHLORIDE 1 MG/ML
INJECTION INTRAMUSCULAR; INTRAVENOUS
Status: COMPLETED
Start: 2018-08-27 | End: 2018-08-27

## 2018-08-27 RX ORDER — NITROGLYCERIN 20 MG/100ML
INJECTION INTRAVENOUS
Status: COMPLETED
Start: 2018-08-27 | End: 2018-08-27

## 2018-08-27 NOTE — PRE-SEDATION ASSESSMENT
[place] [facility]  IR Pre-Procedure Sedation Assessment    History of snoring or sleep or apnea?    No    History of previous problems with anesthesia or sedation  No    Physical Findings:  Neck: nl ROM  CV: RRR  PULM: normal respiratory rate/effor

## 2018-08-27 NOTE — PROCEDURES
[facility] HOSP - [facility]  Procedure Note    Maurizio Gaucher Patient Status:  Outpatient in a Bed    1965 MRN [id number]   Location [facility] Attending Loly Hou MD   Hosp Day # 0 PCP Luis Parson MD

## 2018-08-27 NOTE — H&P
1430 [facility] Patient Status:  Outpatient in a Bed    1965 MRN [id number]   Location [facility] Attending Bhanu Anderson MD   Hosp Day # 0 PCP Dawit Gregg

## 2018-08-30 ENCOUNTER — NURSE ONLY (OUTPATIENT)
Dept: HEMATOLOGY/ONCOLOGY | Facility: HOSPITAL | Age: 53
End: 2018-08-30
Attending: INTERNAL MEDICINE
Payer: COMMERCIAL

## 2018-08-30 VITALS
WEIGHT: 142 LBS | TEMPERATURE: 98 F | HEIGHT: 67 IN | RESPIRATION RATE: 18 BRPM | DIASTOLIC BLOOD PRESSURE: 70 MMHG | BODY MASS INDEX: 22.29 KG/M2 | SYSTOLIC BLOOD PRESSURE: 126 MMHG | HEART RATE: 74 BPM

## 2018-08-30 DIAGNOSIS — C22.0 HEPATOCELLULAR CARCINOMA (HCC): Primary | ICD-10-CM

## 2018-08-30 DIAGNOSIS — B15.9 VIRAL HEPATITIS A WITHOUT HEPATIC COMA: ICD-10-CM

## 2018-08-30 DIAGNOSIS — Z51.11 CHEMOTHERAPY MANAGEMENT, ENCOUNTER FOR: ICD-10-CM

## 2018-08-30 DIAGNOSIS — D50.9 IRON DEFICIENCY ANEMIA, UNSPECIFIED IRON DEFICIENCY ANEMIA TYPE: ICD-10-CM

## 2018-08-30 LAB
ALBUMIN SERPL BCP-MCNC: 3.5 G/DL (ref 3.5–4.8)
ALBUMIN/GLOB SERPL: 0.7 {RATIO} (ref 1–2)
ALP SERPL-CCNC: 130 U/L (ref 32–100)
ALT SERPL-CCNC: 32 U/L (ref 17–63)
ANION GAP SERPL CALC-SCNC: 8 MMOL/L (ref 0–18)
AST SERPL-CCNC: 43 U/L (ref 15–41)
BASOPHILS # BLD: 0 K/UL (ref 0–0.2)
BASOPHILS NFR BLD: 1 %
BILIRUB SERPL-MCNC: 0.4 MG/DL (ref 0.3–1.2)
BUN SERPL-MCNC: 11 MG/DL (ref 8–20)
BUN/CREAT SERPL: 13.8 (ref 10–20)
CALCIUM SERPL-MCNC: 8.9 MG/DL (ref 8.5–10.5)
CHLORIDE SERPL-SCNC: 100 MMOL/L (ref 95–110)
CO2 SERPL-SCNC: 28 MMOL/L (ref 22–32)
CREAT SERPL-MCNC: 0.8 MG/DL (ref 0.5–1.5)
EOSINOPHIL # BLD: 0.8 K/UL (ref 0–0.7)
EOSINOPHIL NFR BLD: 14 %
ERYTHROCYTE [DISTWIDTH] IN BLOOD BY AUTOMATED COUNT: 18.7 % (ref 11–15)
GLOBULIN PLAS-MCNC: 5.2 G/DL (ref 2.5–3.7)
GLUCOSE SERPL-MCNC: 108 MG/DL (ref 70–99)
HCT VFR BLD AUTO: 38.9 % (ref 41–52)
HGB BLD-MCNC: 12.2 G/DL (ref 13.5–17.5)
LYMPHOCYTES # BLD: 0.2 K/UL (ref 1–4)
LYMPHOCYTES NFR BLD: 4 %
MCH RBC QN AUTO: 26 PG (ref 27–32)
MCHC RBC AUTO-ENTMCNC: 31.5 G/DL (ref 32–37)
MCV RBC AUTO: 82.8 FL (ref 80–100)
MONOCYTES # BLD: 0.4 K/UL (ref 0–1)
MONOCYTES NFR BLD: 8 %
NEUTROPHILS # BLD AUTO: 4.4 K/UL (ref 1.8–7.7)
NEUTROPHILS NFR BLD: 74 %
OSMOLALITY UR CALC.SUM OF ELEC: 282 MOSM/KG (ref 275–295)
PATIENT FASTING: NO
PLATELET # BLD AUTO: 339 K/UL (ref 140–400)
PMV BLD AUTO: 7.5 FL (ref 7.4–10.3)
POTASSIUM SERPL-SCNC: 3.7 MMOL/L (ref 3.3–5.1)
PROT SERPL-MCNC: 8.7 G/DL (ref 5.9–8.4)
RBC # BLD AUTO: 4.7 M/UL (ref 4.5–5.9)
SODIUM SERPL-SCNC: 136 MMOL/L (ref 136–144)
WBC # BLD AUTO: 5.9 K/UL (ref 4–11)

## 2018-08-30 PROCEDURE — A4216 STERILE WATER/SALINE, 10 ML: HCPCS

## 2018-08-30 PROCEDURE — 85025 COMPLETE CBC W/AUTO DIFF WBC: CPT

## 2018-08-30 PROCEDURE — 80053 COMPREHEN METABOLIC PANEL: CPT

## 2018-08-30 PROCEDURE — 96413 CHEMO IV INFUSION 1 HR: CPT

## 2018-08-30 PROCEDURE — 82105 ALPHA-FETOPROTEIN SERUM: CPT

## 2018-08-30 PROCEDURE — 99215 OFFICE O/P EST HI 40 MIN: CPT | Performed by: INTERNAL MEDICINE

## 2018-08-30 RX ORDER — 0.9 % SODIUM CHLORIDE 0.9 %
VIAL (ML) INJECTION
Status: DISCONTINUED
Start: 2018-08-30 | End: 2018-08-30

## 2018-08-30 RX ORDER — SODIUM CHLORIDE 9 MG/ML
INJECTION, SOLUTION INTRAVENOUS
Status: DISCONTINUED
Start: 2018-08-30 | End: 2018-08-30

## 2018-08-30 NOTE — PROGRESS NOTES
Pt here for C  6 D 1 opdivo.   Arrives Ambulating independently, accompanied by Other self           Modifications in dose or schedule: No     Frequency of blood return and site check throughout administration: Prior to administration and At completion of t

## 2018-08-30 NOTE — PROGRESS NOTES
Cancer Center Progress Note    Patient Name: Danilo Power   YOB: 1965   Medical Record Number: [id number]   Attending Physician: Ellen Morrell M.D.      Chief Complaint:  [address] hx of hepatitis A    History of Present Illness:  Cancer  50 History:  Past Surgical History:  12/29/2017: COLONOSCOPY N/A      Comment: Procedure: COLONOSCOPY;  Surgeon:                Amara Lira MD;  Location: [address] [place] [address]                ENDOSCOPY    Family History:  Family History   Problem Relation Age of Onse palpable distal pulses   Abdomen: Soft, non tender. Extremities: No edema. Neurological: 5/5 motor x4.         Laboratory:  Recent Labs   Lab  08/30/18   1220   WBC  5.9   HGB  12.2*   PLT  339   NE  4.4           Lab Results  Component Value Date   GLU pateint and family.      Bertha Morris MD

## 2018-08-31 LAB — AFP-TM SERPL-MCNC: 590.4 NG/ML (ref 0–8.9)

## 2018-09-13 ENCOUNTER — OFFICE VISIT (OUTPATIENT)
Dept: HEMATOLOGY/ONCOLOGY | Facility: HOSPITAL | Age: 53
End: 2018-09-13
Attending: INTERNAL MEDICINE
Payer: COMMERCIAL

## 2018-09-13 VITALS
DIASTOLIC BLOOD PRESSURE: 77 MMHG | WEIGHT: 144 LBS | RESPIRATION RATE: 16 BRPM | TEMPERATURE: 98 F | SYSTOLIC BLOOD PRESSURE: 128 MMHG | HEART RATE: 79 BPM | BODY MASS INDEX: 22.6 KG/M2 | HEIGHT: 67 IN

## 2018-09-13 VITALS
BODY MASS INDEX: 23 KG/M2 | TEMPERATURE: 98 F | SYSTOLIC BLOOD PRESSURE: 128 MMHG | RESPIRATION RATE: 16 BRPM | DIASTOLIC BLOOD PRESSURE: 77 MMHG | HEART RATE: 79 BPM | WEIGHT: 144 LBS

## 2018-09-13 DIAGNOSIS — B15.9 VIRAL HEPATITIS A WITHOUT HEPATIC COMA: ICD-10-CM

## 2018-09-13 DIAGNOSIS — C22.0 HEPATOCELLULAR CARCINOMA (HCC): Primary | ICD-10-CM

## 2018-09-13 DIAGNOSIS — Z51.11 CHEMOTHERAPY MANAGEMENT, ENCOUNTER FOR: ICD-10-CM

## 2018-09-13 DIAGNOSIS — D50.9 IRON DEFICIENCY ANEMIA, UNSPECIFIED IRON DEFICIENCY ANEMIA TYPE: ICD-10-CM

## 2018-09-13 LAB
ALBUMIN SERPL BCP-MCNC: 3.4 G/DL (ref 3.5–4.8)
ALBUMIN/GLOB SERPL: 0.7 {RATIO} (ref 1–2)
ALP SERPL-CCNC: 148 U/L (ref 32–100)
ALT SERPL-CCNC: 29 U/L (ref 17–63)
ANION GAP SERPL CALC-SCNC: 8 MMOL/L (ref 0–18)
AST SERPL-CCNC: 38 U/L (ref 15–41)
BASOPHILS # BLD: 0.1 K/UL (ref 0–0.2)
BASOPHILS NFR BLD: 1 %
BILIRUB SERPL-MCNC: 0.6 MG/DL (ref 0.3–1.2)
BUN SERPL-MCNC: 14 MG/DL (ref 8–20)
BUN/CREAT SERPL: 18.9 (ref 10–20)
CALCIUM SERPL-MCNC: 9.1 MG/DL (ref 8.5–10.5)
CHLORIDE SERPL-SCNC: 103 MMOL/L (ref 95–110)
CO2 SERPL-SCNC: 27 MMOL/L (ref 22–32)
CREAT SERPL-MCNC: 0.74 MG/DL (ref 0.5–1.5)
EOSINOPHIL # BLD: 1 K/UL (ref 0–0.7)
EOSINOPHIL NFR BLD: 15 %
ERYTHROCYTE [DISTWIDTH] IN BLOOD BY AUTOMATED COUNT: 19.4 % (ref 11–15)
GLOBULIN PLAS-MCNC: 4.7 G/DL (ref 2.5–3.7)
GLUCOSE SERPL-MCNC: 86 MG/DL (ref 70–99)
HCT VFR BLD AUTO: 39.8 % (ref 41–52)
HGB BLD-MCNC: 12.8 G/DL (ref 13.5–17.5)
LYMPHOCYTES # BLD: 0.3 K/UL (ref 1–4)
LYMPHOCYTES NFR BLD: 5 %
MCH RBC QN AUTO: 26.7 PG (ref 27–32)
MCHC RBC AUTO-ENTMCNC: 32.1 G/DL (ref 32–37)
MCV RBC AUTO: 83.1 FL (ref 80–100)
MONOCYTES # BLD: 0.5 K/UL (ref 0–1)
MONOCYTES NFR BLD: 8 %
NEUTROPHILS # BLD AUTO: 4.8 K/UL (ref 1.8–7.7)
NEUTROPHILS NFR BLD: 68 %
NEUTS BAND NFR BLD: 3 %
OSMOLALITY UR CALC.SUM OF ELEC: 286 MOSM/KG (ref 275–295)
PATIENT FASTING: NO
PLATELET # BLD AUTO: 352 K/UL (ref 140–400)
PMV BLD AUTO: 7.5 FL (ref 7.4–10.3)
POTASSIUM SERPL-SCNC: 3.9 MMOL/L (ref 3.3–5.1)
PROT SERPL-MCNC: 8.1 G/DL (ref 5.9–8.4)
RBC # BLD AUTO: 4.79 M/UL (ref 4.5–5.9)
SODIUM SERPL-SCNC: 138 MMOL/L (ref 136–144)
TSH SERPL-ACNC: 0.77 UIU/ML (ref 0.45–5.33)
WBC # BLD AUTO: 6.8 K/UL (ref 4–11)

## 2018-09-13 PROCEDURE — 99215 OFFICE O/P EST HI 40 MIN: CPT | Performed by: INTERNAL MEDICINE

## 2018-09-13 PROCEDURE — 80053 COMPREHEN METABOLIC PANEL: CPT

## 2018-09-13 PROCEDURE — 85025 COMPLETE CBC W/AUTO DIFF WBC: CPT

## 2018-09-13 PROCEDURE — 96413 CHEMO IV INFUSION 1 HR: CPT

## 2018-09-13 PROCEDURE — A4216 STERILE WATER/SALINE, 10 ML: HCPCS

## 2018-09-13 PROCEDURE — 82105 ALPHA-FETOPROTEIN SERUM: CPT

## 2018-09-13 PROCEDURE — 84443 ASSAY THYROID STIM HORMONE: CPT

## 2018-09-13 RX ORDER — SODIUM CHLORIDE 9 MG/ML
INJECTION, SOLUTION INTRAVENOUS
Status: DISCONTINUED
Start: 2018-09-13 | End: 2018-09-13

## 2018-09-13 RX ORDER — 0.9 % SODIUM CHLORIDE 0.9 %
VIAL (ML) INJECTION
Status: DISCONTINUED
Start: 2018-09-13 | End: 2018-09-13

## 2018-09-13 NOTE — PROGRESS NOTES
Yinka to infusion today for C7 D1 Opdivo. Arrives Ambulating independently. No complaints toady, is feeling  Well. Denies fevers or flu-like symptoms.      Lab Results   Component Value Date    WBC 6.8 09/13/2018    HGB 12.8 (L) 09/13/2018    HCT 39.8 (L)

## 2018-09-13 NOTE — PROGRESS NOTES
Cancer Center Progress Note    Patient Name: Henrik Coates   YOB: 1965   Medical Record Number: [id number]   Attending Physician: David Roldan M.D.      Chief Complaint:  [address] hx of hepatitis A    History of Present Illness:  Cancer  48 History:  History reviewed. No pertinent surgical history.     Family History:  Family History   Problem Relation Age of Onset   • Colon Cancer Paternal Grandfather        Social History:  Social History    Socioeconomic History      Marital status: Single Allergies     Review of Systems:  All other systems reviewed and negative x12    Vital Signs:  /77 (BP Location: Left arm, Patient Position: Sitting, Cuff Size: adult)   Pulse 79   Temp 97.6 °F (36.4 °C) (Oral)   Resp 16   Ht 1.702 m (5' 7\")   Wt 65 representing questionable extrahepatic disease and he was on sorafenib as of April 2018  [facility] had new areas of disease on imaging in June 2018 and was switched to second line treatment with nivolumab.   He received a second y-90 7/11/18  –Continue with cycle

## 2018-09-14 LAB — AFP-TM SERPL-MCNC: 597.7 NG/ML (ref 0–8.9)

## 2018-09-27 ENCOUNTER — HOSPITAL ENCOUNTER (OUTPATIENT)
Dept: CT IMAGING | Facility: HOSPITAL | Age: 53
Discharge: HOME | End: 2018-09-27
Attending: INTERNAL MEDICINE
Payer: COMMERCIAL

## 2018-09-27 ENCOUNTER — NURSE ONLY (OUTPATIENT)
Dept: HEMATOLOGY/ONCOLOGY | Facility: HOSPITAL | Age: 53
End: 2018-09-27
Attending: INTERNAL MEDICINE
Payer: COMMERCIAL

## 2018-09-27 ENCOUNTER — HOSPITAL ENCOUNTER (OUTPATIENT)
Dept: MRI IMAGING | Facility: HOSPITAL | Age: 53
Discharge: HOME | End: 2018-09-27
Attending: CLINICAL NURSE SPECIALIST
Payer: COMMERCIAL

## 2018-09-27 VITALS
WEIGHT: 144 LBS | SYSTOLIC BLOOD PRESSURE: 125 MMHG | BODY MASS INDEX: 22.6 KG/M2 | DIASTOLIC BLOOD PRESSURE: 99 MMHG | HEART RATE: 76 BPM | RESPIRATION RATE: 18 BRPM | HEIGHT: 67 IN | TEMPERATURE: 98 F

## 2018-09-27 DIAGNOSIS — C22.0 HEPATOCELLULAR CARCINOMA (HCC): Primary | ICD-10-CM

## 2018-09-27 DIAGNOSIS — C22.0 HEPATOCELLULAR CARCINOMA (HCC): ICD-10-CM

## 2018-09-27 DIAGNOSIS — B15.9 VIRAL HEPATITIS A WITHOUT HEPATIC COMA: ICD-10-CM

## 2018-09-27 DIAGNOSIS — Z51.11 CHEMOTHERAPY MANAGEMENT, ENCOUNTER FOR: ICD-10-CM

## 2018-09-27 DIAGNOSIS — D50.9 IRON DEFICIENCY ANEMIA, UNSPECIFIED IRON DEFICIENCY ANEMIA TYPE: ICD-10-CM

## 2018-09-27 LAB
ALBUMIN SERPL BCP-MCNC: 3.4 G/DL (ref 3.5–4.8)
ALBUMIN/GLOB SERPL: 0.8 {RATIO} (ref 1–2)
ALP SERPL-CCNC: 153 U/L (ref 32–100)
ALT SERPL-CCNC: 31 U/L (ref 17–63)
ANION GAP SERPL CALC-SCNC: 10 MMOL/L (ref 0–18)
AST SERPL-CCNC: 37 U/L (ref 15–41)
BASOPHILS # BLD: 0 K/UL (ref 0–0.2)
BASOPHILS NFR BLD: 0 %
BILIRUB SERPL-MCNC: 0.3 MG/DL (ref 0.3–1.2)
BUN SERPL-MCNC: 14 MG/DL (ref 8–20)
BUN/CREAT SERPL: 17.7 (ref 10–20)
CALCIUM SERPL-MCNC: 8.9 MG/DL (ref 8.5–10.5)
CHLORIDE SERPL-SCNC: 106 MMOL/L (ref 95–110)
CO2 SERPL-SCNC: 25 MMOL/L (ref 22–32)
CREAT SERPL-MCNC: 0.79 MG/DL (ref 0.5–1.5)
EOSINOPHIL # BLD: 1 K/UL (ref 0–0.7)
EOSINOPHIL NFR BLD: 16 %
ERYTHROCYTE [DISTWIDTH] IN BLOOD BY AUTOMATED COUNT: 20.3 % (ref 11–15)
GLOBULIN PLAS-MCNC: 4.1 G/DL (ref 2.5–3.7)
GLUCOSE SERPL-MCNC: 122 MG/DL (ref 70–99)
HCT VFR BLD AUTO: 40.6 % (ref 41–52)
HGB BLD-MCNC: 13.2 G/DL (ref 13.5–17.5)
LYMPHOCYTES # BLD: 0.3 K/UL (ref 1–4)
LYMPHOCYTES NFR BLD: 5 %
MCH RBC QN AUTO: 27.4 PG (ref 27–32)
MCHC RBC AUTO-ENTMCNC: 32.6 G/DL (ref 32–37)
MCV RBC AUTO: 83.8 FL (ref 80–100)
MONOCYTES # BLD: 0.6 K/UL (ref 0–1)
MONOCYTES NFR BLD: 9 %
NEUTROPHILS # BLD AUTO: 4.7 K/UL (ref 1.8–7.7)
NEUTROPHILS NFR BLD: 70 %
OSMOLALITY UR CALC.SUM OF ELEC: 294 MOSM/KG (ref 275–295)
PATIENT FASTING: NO
PLATELET # BLD AUTO: 282 K/UL (ref 140–400)
PMV BLD AUTO: 8.2 FL (ref 7.4–10.3)
POTASSIUM SERPL-SCNC: 4 MMOL/L (ref 3.3–5.1)
PROT SERPL-MCNC: 7.5 G/DL (ref 5.9–8.4)
RBC # BLD AUTO: 4.84 M/UL (ref 4.5–5.9)
SODIUM SERPL-SCNC: 141 MMOL/L (ref 136–144)
WBC # BLD AUTO: 6.6 K/UL (ref 4–11)

## 2018-09-27 PROCEDURE — 74183 MRI ABD W/O CNTR FLWD CNTR: CPT | Performed by: CLINICAL NURSE SPECIALIST

## 2018-09-27 PROCEDURE — 85060 BLOOD SMEAR INTERPRETATION: CPT

## 2018-09-27 PROCEDURE — 71250 CT THORAX DX C-: CPT | Performed by: INTERNAL MEDICINE

## 2018-09-27 PROCEDURE — 96413 CHEMO IV INFUSION 1 HR: CPT

## 2018-09-27 PROCEDURE — 82105 ALPHA-FETOPROTEIN SERUM: CPT

## 2018-09-27 PROCEDURE — 85025 COMPLETE CBC W/AUTO DIFF WBC: CPT

## 2018-09-27 PROCEDURE — A4216 STERILE WATER/SALINE, 10 ML: HCPCS

## 2018-09-27 PROCEDURE — 80053 COMPREHEN METABOLIC PANEL: CPT

## 2018-09-27 PROCEDURE — A9575 INJ GADOTERATE MEGLUMI 0.1ML: HCPCS | Performed by: CLINICAL NURSE SPECIALIST

## 2018-09-27 PROCEDURE — 99215 OFFICE O/P EST HI 40 MIN: CPT | Performed by: INTERNAL MEDICINE

## 2018-09-27 RX ORDER — 0.9 % SODIUM CHLORIDE 0.9 %
VIAL (ML) INJECTION
Status: DISCONTINUED
Start: 2018-09-27 | End: 2018-09-27

## 2018-09-27 RX ORDER — SODIUM CHLORIDE 9 MG/ML
INJECTION, SOLUTION INTRAVENOUS
Status: DISCONTINUED
Start: 2018-09-27 | End: 2018-09-27

## 2018-09-27 NOTE — PROGRESS NOTES
Pt here for C8D1 Opdivo. Arrives Ambulating independently, accompanied by Other self           Modifications in dose or schedule: No.  Pt denies complaints today. MD appointment prior to infusion today.      Frequency of blood return and site check throug

## 2018-09-27 NOTE — PROGRESS NOTES
Cancer Center Progress Note    Patient Name: Griffin Hernandes   YOB: 1965   Medical Record Number: [id number]   Attending Physician: Sameer Garcia M.D.      Chief Complaint:  [address] hx of hepatitis A    History of Present Illness:  Cancer  48 History:  Past Surgical History:  12/29/2017: COLONOSCOPY; N/A      Comment:  Procedure: COLONOSCOPY;  Surgeon: Dacia Gavin MD;  Location: [address] [place] [address] ENDOSCOPY  12/29/2017: COLONOSCOPY; N/A      Comment:  Performed by Dacia Gavin tablet, Rfl: 0  •  HYDROcodone-acetaminophen 5-325 MG Oral Tab, Take 1 tablet by mouth every 4 (four) hours as needed for Pain., Disp: 60 tablet, Rfl: 0  •  omega-3 fatty acids 1000 MG Oral Cap, Take 1,000 mg by mouth daily. , Disp: , Rfl:   •  finasteride mass and elevated AFP with imaging consistent with hepatocellular carcinoma. Maria R Hirsch is not a surgical or transplantation candidate.   He is following with interventional radiology for Y 90 treatment as of the end of April 2018  –There are regionally enlarged

## 2018-09-27 NOTE — PROGRESS NOTES
Patient arrived to [place] for   FT labs prior to MD visit and possible chemo  Charlie Salvage arrived from radiology with PIV placed in right  [facility]   flushed easily;, good blood return  labs collected. PIV flushed, saline locked .  Alcohol cap placed  discharged stabl

## 2018-09-28 DIAGNOSIS — C22.0 HEPATOCELLULAR CARCINOMA (HCC): Primary | ICD-10-CM

## 2018-09-28 LAB — AFP-TM SERPL-MCNC: 717.2 NG/ML (ref 0–8.9)

## 2018-10-10 ENCOUNTER — OFFICE VISIT (OUTPATIENT)
Dept: HEMATOLOGY/ONCOLOGY | Facility: HOSPITAL | Age: 53
End: 2018-10-10
Attending: INTERNAL MEDICINE
Payer: COMMERCIAL

## 2018-10-10 VITALS
TEMPERATURE: 99 F | WEIGHT: 145 LBS | BODY MASS INDEX: 22.76 KG/M2 | RESPIRATION RATE: 18 BRPM | HEART RATE: 75 BPM | SYSTOLIC BLOOD PRESSURE: 138 MMHG | HEIGHT: 67 IN | DIASTOLIC BLOOD PRESSURE: 80 MMHG

## 2018-10-10 DIAGNOSIS — B15.9 VIRAL HEPATITIS A WITHOUT HEPATIC COMA: ICD-10-CM

## 2018-10-10 DIAGNOSIS — D50.9 IRON DEFICIENCY ANEMIA, UNSPECIFIED IRON DEFICIENCY ANEMIA TYPE: ICD-10-CM

## 2018-10-10 DIAGNOSIS — C22.0 HEPATOCELLULAR CARCINOMA (HCC): Primary | ICD-10-CM

## 2018-10-10 DIAGNOSIS — Z51.11 CHEMOTHERAPY MANAGEMENT, ENCOUNTER FOR: ICD-10-CM

## 2018-10-10 PROCEDURE — 82105 ALPHA-FETOPROTEIN SERUM: CPT

## 2018-10-10 PROCEDURE — 36415 COLL VENOUS BLD VENIPUNCTURE: CPT

## 2018-10-10 PROCEDURE — 85025 COMPLETE CBC W/AUTO DIFF WBC: CPT

## 2018-10-10 PROCEDURE — 99215 OFFICE O/P EST HI 40 MIN: CPT | Performed by: INTERNAL MEDICINE

## 2018-10-10 PROCEDURE — 80053 COMPREHEN METABOLIC PANEL: CPT

## 2018-10-10 NOTE — PROGRESS NOTES
Cancer Center Progress Note    Patient Name: Danilo Power   YOB: 1965   Medical Record Number: [id number]   Attending Physician: Ellen Morrell M.D.      Chief Complaint:  [address] hx of hepatitis A    History of Present Illness:  Cancer  46 History:  Past Surgical History:   Procedure Laterality Date   • COLONOSCOPY N/A 12/29/2017    Procedure: COLONOSCOPY;  Surgeon: Warren Dunlap MD;  Location: [address] [place] [address] ENDOSCOPY   • COLONOSCOPY N/A 12/29/2017    Performed by Warren Dunlap MD at  tablet, Rfl: 0  •  HYDROcodone-acetaminophen 5-325 MG Oral Tab, Take 1 tablet by mouth every 4 (four) hours as needed for Pain., Disp: 60 tablet, Rfl: 0  •  omega-3 fatty acids 1000 MG Oral Cap, Take 1,000 mg by mouth daily. , Disp: , Rfl:   •  finasteride and elevated AFP with imaging consistent with hepatocellular carcinoma. Lino Douglass is not a surgical or transplantation candidate.   He is following with interventional radiology for Y 90 treatment as of the end of April 2018  –There are regionally enlarged lymph

## 2018-10-11 ENCOUNTER — OFFICE VISIT (OUTPATIENT)
Dept: HEMATOLOGY/ONCOLOGY | Facility: HOSPITAL | Age: 53
End: 2018-10-11
Attending: INTERNAL MEDICINE
Payer: COMMERCIAL

## 2018-10-11 VITALS
SYSTOLIC BLOOD PRESSURE: 129 MMHG | TEMPERATURE: 98 F | RESPIRATION RATE: 16 BRPM | HEART RATE: 69 BPM | DIASTOLIC BLOOD PRESSURE: 81 MMHG

## 2018-10-11 DIAGNOSIS — C22.0 HEPATOCELLULAR CARCINOMA (HCC): Primary | ICD-10-CM

## 2018-10-11 PROCEDURE — 96413 CHEMO IV INFUSION 1 HR: CPT

## 2018-10-11 RX ORDER — SODIUM CHLORIDE 9 MG/ML
INJECTION, SOLUTION INTRAVENOUS
Status: DISCONTINUED
Start: 2018-10-11 | End: 2018-10-11

## 2018-10-11 NOTE — PROGRESS NOTES
Pt here for [address]. Arrives Ambulating independently, accompanied by Other self           Modifications in dose or schedule: No.  Pt denies complaints today. Pt states this will be the last Opdivo and then he will be starting radiation.  He is pl

## 2018-10-25 ENCOUNTER — APPOINTMENT (OUTPATIENT)
Dept: HEMATOLOGY/ONCOLOGY | Facility: HOSPITAL | Age: 53
End: 2018-10-25
Attending: INTERNAL MEDICINE
Payer: COMMERCIAL

## 2018-10-25 VITALS
BODY MASS INDEX: 22.76 KG/M2 | HEIGHT: 67 IN | WEIGHT: 145 LBS | RESPIRATION RATE: 18 BRPM | DIASTOLIC BLOOD PRESSURE: 83 MMHG | TEMPERATURE: 98 F | SYSTOLIC BLOOD PRESSURE: 134 MMHG | HEART RATE: 79 BPM

## 2018-10-25 DIAGNOSIS — C22.0 HEPATOCELLULAR CARCINOMA (HCC): ICD-10-CM

## 2018-10-25 DIAGNOSIS — D50.9 IRON DEFICIENCY ANEMIA, UNSPECIFIED IRON DEFICIENCY ANEMIA TYPE: ICD-10-CM

## 2018-10-25 DIAGNOSIS — C22.0 HEPATIC CARCINOMA (HCC): ICD-10-CM

## 2018-10-25 DIAGNOSIS — Z51.11 CHEMOTHERAPY MANAGEMENT, ENCOUNTER FOR: ICD-10-CM

## 2018-10-25 DIAGNOSIS — B15.9 VIRAL HEPATITIS A WITHOUT HEPATIC COMA: ICD-10-CM

## 2018-10-25 DIAGNOSIS — C22.0 HEPATOCELLULAR CARCINOMA (HCC): Primary | ICD-10-CM

## 2018-10-25 DIAGNOSIS — R19.7 DIARRHEA, UNSPECIFIED TYPE: ICD-10-CM

## 2018-10-25 PROCEDURE — 82105 ALPHA-FETOPROTEIN SERUM: CPT

## 2018-10-25 PROCEDURE — 85610 PROTHROMBIN TIME: CPT

## 2018-10-25 PROCEDURE — 85025 COMPLETE CBC W/AUTO DIFF WBC: CPT

## 2018-10-25 PROCEDURE — 85007 BL SMEAR W/DIFF WBC COUNT: CPT

## 2018-10-25 PROCEDURE — 80053 COMPREHEN METABOLIC PANEL: CPT

## 2018-10-25 PROCEDURE — 85027 COMPLETE CBC AUTOMATED: CPT

## 2018-10-25 PROCEDURE — 99215 OFFICE O/P EST HI 40 MIN: CPT | Performed by: INTERNAL MEDICINE

## 2018-10-25 PROCEDURE — 84443 ASSAY THYROID STIM HORMONE: CPT

## 2018-10-25 RX ORDER — PREDNISONE 20 MG/1
60 TABLET ORAL DAILY
Qty: 18 TABLET | Refills: 0 | Status: SHIPPED | OUTPATIENT
Start: 2018-10-25 | End: 2018-01-01 | Stop reason: ALTCHOICE

## 2018-10-25 NOTE — PROGRESS NOTES
Cancer Center Progress Note    Patient Name: Ken Jc   YOB: 1965   Medical Record Number: [id number]   Attending Physician: Kt Prajapati M.D.      Chief Complaint:  [address] hx of hepatitis A    History of Present Illness:  Cancer  46 History:  Past Surgical History:   Procedure Laterality Date   • COLONOSCOPY N/A 12/29/2017    Procedure: COLONOSCOPY;  Surgeon: Dacia Gavin MD;  Location: [address] [place] [address] ENDOSCOPY   • COLONOSCOPY N/A 12/29/2017    Performed by Dacia Gavin MD at  Take 12 mg by mouth daily. , Disp: 90 each, Rfl: 5  •  Ondansetron HCl (ZOFRAN) 4 mg tablet, Take 1 tablet (4 mg total) by mouth every 8 (eight) hours as needed for Nausea., Disp: 20 tablet, Rfl: 0  •  HYDROcodone-acetaminophen 5-325 MG Oral Tab, Take 1 tab CO2 29 10/25/2018           Cancer Staging  No matching staging information was found for the patient.     Impression and Plan:  65-year-old male with a remote history of hepatitis A being evaluated by Oncology for a liver mass and elevated AFP with imaging

## 2018-10-25 NOTE — PROGRESS NOTES
Cancer Center Progress Note    Patient Name: Jaguar Waldrop   YOB: 1965   Medical Record Number: [id number]   Attending Physician: Johan Shankar M.D.      Chief Complaint:  [address] hx of hepatitis A    History of Present Illness:  Cancer  48 Had some diarrhea in the last week. Denies any fevers or chills. Denies any skin changes.     Performance Status:  ECOG 0  Past Medical History:  Past Medical History:   Diagnosis Date   • Prediabetes        Past Surgical History:  Past Surgical History: 4 (3) MG Oral Capsule Therapy Pack, Take 12 mg by mouth daily. , Disp: 90 each, Rfl: 5  •  Ondansetron HCl (ZOFRAN) 4 mg tablet, Take 1 tablet (4 mg total) by mouth every 8 (eight) hours as needed for Nausea., Disp: 20 tablet, Rfl: 0  •  HYDROcodone-acetami 10/25/2018     10/25/2018    CO2 29 10/25/2018           Cancer Staging  No matching staging information was found for the patient.     Impression and Plan:  53-year-old male with a remote history of hepatitis A being evaluated by Oncology for a liver

## 2018-10-31 ENCOUNTER — HOSPITAL ENCOUNTER (OUTPATIENT)
Dept: NUCLEAR MEDICINE | Facility: HOSPITAL | Age: 53
Discharge: HOME | End: 2018-10-31
Attending: RADIOLOGY
Payer: COMMERCIAL

## 2018-10-31 ENCOUNTER — HOSPITAL ENCOUNTER (OUTPATIENT)
Dept: INTERVENTIONAL RADIOLOGY/VASCULAR | Facility: HOSPITAL | Age: 53
Discharge: HOME | End: 2018-10-31
Attending: RADIOLOGY | Admitting: RADIOLOGY
Payer: COMMERCIAL

## 2018-10-31 VITALS
OXYGEN SATURATION: 98 % | HEART RATE: 63 BPM | SYSTOLIC BLOOD PRESSURE: 129 MMHG | DIASTOLIC BLOOD PRESSURE: 84 MMHG | RESPIRATION RATE: 16 BRPM | BODY MASS INDEX: 22 KG/M2 | WEIGHT: 140 LBS

## 2018-10-31 DIAGNOSIS — C22.0 HEPATOCELLULAR CARCINOMA (HCC): ICD-10-CM

## 2018-10-31 PROCEDURE — 37243 VASC EMBOLIZE/OCCLUDE ORGAN: CPT

## 2018-10-31 PROCEDURE — 79445 NUCLEAR RX INTRA-ARTERIAL: CPT

## 2018-10-31 PROCEDURE — B41J1ZZ FLUOROSCOPY OF OTHER LOWER ARTERIES USING LOW OSMOLAR CONTRAST: ICD-10-PCS | Performed by: RADIOLOGY

## 2018-10-31 PROCEDURE — 99153 MOD SED SAME PHYS/QHP EA: CPT

## 2018-10-31 PROCEDURE — 77790 RADIATION HANDLING: CPT | Performed by: RADIOLOGY

## 2018-10-31 PROCEDURE — 04L33DZ OCCLUSION OF HEPATIC ARTERY WITH INTRALUMINAL DEVICE, PERCUTANEOUS APPROACH: ICD-10-PCS | Performed by: RADIOLOGY

## 2018-10-31 PROCEDURE — 75726 ARTERY X-RAYS ABDOMEN: CPT

## 2018-10-31 PROCEDURE — 99152 MOD SED SAME PHYS/QHP 5/>YRS: CPT

## 2018-10-31 PROCEDURE — 36248 INS CATH ABD/L-EXT ART ADDL: CPT

## 2018-10-31 PROCEDURE — 76377 3D RENDER W/INTRP POSTPROCES: CPT

## 2018-10-31 PROCEDURE — 36247 INS CATH ABD/L-EXT ART 3RD: CPT

## 2018-10-31 RX ORDER — HEPARIN SODIUM 1000 [USP'U]/ML
INJECTION, SOLUTION INTRAVENOUS; SUBCUTANEOUS
Status: COMPLETED
Start: 2018-10-31 | End: 2018-10-31

## 2018-10-31 RX ORDER — MIDAZOLAM HYDROCHLORIDE 1 MG/ML
INJECTION INTRAMUSCULAR; INTRAVENOUS
Status: COMPLETED
Start: 2018-10-31 | End: 2018-10-31

## 2018-10-31 RX ORDER — LIDOCAINE HYDROCHLORIDE 20 MG/ML
INJECTION, SOLUTION EPIDURAL; INFILTRATION; INTRACAUDAL; PERINEURAL
Status: COMPLETED
Start: 2018-10-31 | End: 2018-10-31

## 2018-10-31 RX ORDER — SODIUM CHLORIDE 9 MG/ML
INJECTION, SOLUTION INTRAVENOUS
Status: COMPLETED
Start: 2018-10-31 | End: 2018-10-31

## 2018-10-31 RX ORDER — SODIUM CHLORIDE 9 MG/ML
INJECTION, SOLUTION INTRAVENOUS CONTINUOUS
Status: DISCONTINUED | OUTPATIENT
Start: 2018-10-31 | End: 2018-10-31

## 2018-10-31 RX ADMIN — SODIUM CHLORIDE: 9 INJECTION, SOLUTION INTRAVENOUS at 08:00:00

## 2018-10-31 NOTE — PROGRESS NOTES
Patient given discharge and follow-up instructions. Procedure access site c/d/i and free of hematoma or active bleeding. Patient tolerating PO, VS stable, and ambulating without difficulty or changes in procedure access site.  MD spoke with patient post pro

## 2018-10-31 NOTE — PRE-SEDATION ASSESSMENT
[place] [facility]  IR Pre-Procedure Sedation Assessment    History of snoring or sleep or apnea?    No    History of previous problems with anesthesia or sedation  No    Physical Findings:  Neck: nl ROM  CV: RRR  PULM: normal respiratory rate/effor

## 2018-10-31 NOTE — PROCEDURES
[facility] HOSP - [facility]  Procedure Note    Kofi Wesley Patient Status:  Outpatient in a Bed    1965 MRN [id number]   Location [facility] Attending Bessy Jernigan MD   Hosp Day # 0 PCP Praneeth Cochran MD

## 2018-10-31 NOTE — H&P
1430 [facility] Patient Status:  Outpatient in a Bed    1965 MRN [id number]   Location [facility] Attending Teja Chowdhury MD   Hosp Day # 0 PCP Marcelina Morocho GFRAA  >60   GFRNAA  >60   CA  9.2   NA  141   K  3.8   CL  107   CO2  29       Assessment/Plan:   Impression: Progressive multifocal hepatocellular carcinoma    Recommendations: Y-90 radioembolization    Alysia Hwang MD  10/31/2018  8:05 AM

## 2018-11-08 ENCOUNTER — APPOINTMENT (OUTPATIENT)
Dept: HEMATOLOGY/ONCOLOGY | Facility: HOSPITAL | Age: 53
End: 2018-11-08
Attending: INTERNAL MEDICINE
Payer: COMMERCIAL

## 2018-11-29 NOTE — PROGRESS NOTES
Cancer Center Progress Note    Patient Name: Willie General   YOB: 1965   Medical Record Number: [id number]   Attending Physician: Abena Aparicio M.D.      Chief Complaint:  [address] hx of hepatitis A    History of Present Illness:  Cancer  48 Denies any fevers or chills. Denies any skin changes.     Performance Status:  ECOG 0  Past Medical History:  Past Medical History:   Diagnosis Date   • Cancer [facility])    • Personal history of antineoplastic chemotherapy    • Prediabetes        Past Surgical Nausea., Disp: 20 tablet, Rfl: 0  •  HYDROcodone-acetaminophen 5-325 MG Oral Tab, Take 1 tablet by mouth every 4 (four) hours as needed for Pain., Disp: 60 tablet, Rfl: 0  •  finasteride 5 MG Oral Tab, 5 mg. 1 mg daily , Disp: , Rfl:   •  Lenvatinib 12 MG of hepatitis A being evaluated by Oncology for a liver mass and elevated AFP with imaging consistent with hepatocellular carcinoma. Vipin Mar is not a surgical or transplantation candidate.   He is following with interventional radiology for Y 90 treatment as of

## 2019-01-01 ENCOUNTER — ANESTHESIA EVENT (OUTPATIENT)
Dept: ENDOSCOPY | Facility: HOSPITAL | Age: 54
DRG: 444 | End: 2019-01-01
Payer: COMMERCIAL

## 2019-01-01 ENCOUNTER — TELEPHONE (OUTPATIENT)
Dept: HEMATOLOGY/ONCOLOGY | Facility: HOSPITAL | Age: 54
End: 2019-01-01

## 2019-01-01 ENCOUNTER — ANESTHESIA EVENT (OUTPATIENT)
Dept: SURGERY | Facility: HOSPITAL | Age: 54
DRG: 444 | End: 2019-01-01
Payer: COMMERCIAL

## 2019-01-01 ENCOUNTER — OFFICE VISIT (OUTPATIENT)
Dept: HEMATOLOGY/ONCOLOGY | Facility: HOSPITAL | Age: 54
End: 2019-01-01
Attending: INTERNAL MEDICINE
Payer: COMMERCIAL

## 2019-01-01 ENCOUNTER — APPOINTMENT (OUTPATIENT)
Dept: GENERAL RADIOLOGY | Facility: HOSPITAL | Age: 54
DRG: 444 | End: 2019-01-01
Attending: INTERNAL MEDICINE
Payer: COMMERCIAL

## 2019-01-01 ENCOUNTER — APPOINTMENT (OUTPATIENT)
Dept: CT IMAGING | Facility: HOSPITAL | Age: 54
DRG: 871 | End: 2019-01-01
Attending: EMERGENCY MEDICINE
Payer: COMMERCIAL

## 2019-01-01 ENCOUNTER — APPOINTMENT (OUTPATIENT)
Dept: MRI IMAGING | Facility: HOSPITAL | Age: 54
DRG: 444 | End: 2019-01-01
Attending: EMERGENCY MEDICINE
Payer: COMMERCIAL

## 2019-01-01 ENCOUNTER — APPOINTMENT (OUTPATIENT)
Dept: GENERAL RADIOLOGY | Facility: HOSPITAL | Age: 54
DRG: 444 | End: 2019-01-01
Attending: PHYSICIAN ASSISTANT
Payer: COMMERCIAL

## 2019-01-01 ENCOUNTER — ANESTHESIA EVENT (OUTPATIENT)
Dept: SURGERY | Facility: HOSPITAL | Age: 54
DRG: 339 | End: 2019-01-01
Payer: COMMERCIAL

## 2019-01-01 ENCOUNTER — APPOINTMENT (OUTPATIENT)
Dept: PICC SERVICES | Facility: HOSPITAL | Age: 54
DRG: 948 | End: 2019-01-01
Attending: INTERNAL MEDICINE
Payer: OTHER MISCELLANEOUS

## 2019-01-01 ENCOUNTER — APPOINTMENT (OUTPATIENT)
Dept: ULTRASOUND IMAGING | Facility: HOSPITAL | Age: 54
DRG: 444 | End: 2019-01-01
Attending: INTERNAL MEDICINE
Payer: COMMERCIAL

## 2019-01-01 ENCOUNTER — HOSPITAL ENCOUNTER (INPATIENT)
Facility: HOSPITAL | Age: 54
LOS: 8 days | Discharge: HOME | DRG: 339 | End: 2019-01-01
Attending: INTERNAL MEDICINE | Admitting: INTERNAL MEDICINE
Payer: COMMERCIAL

## 2019-01-01 ENCOUNTER — HOSPITAL ENCOUNTER (OUTPATIENT)
Dept: CT IMAGING | Facility: HOSPITAL | Age: 54
End: 2019-01-01
Attending: RADIOLOGY
Payer: COMMERCIAL

## 2019-01-01 ENCOUNTER — LAB ENCOUNTER (OUTPATIENT)
Dept: LAB | Facility: HOSPITAL | Age: 54
End: 2019-01-01
Attending: INTERNAL MEDICINE
Payer: COMMERCIAL

## 2019-01-01 ENCOUNTER — PATIENT MESSAGE (OUTPATIENT)
Dept: GASTROENTEROLOGY | Facility: CLINIC | Age: 54
End: 2019-01-01

## 2019-01-01 ENCOUNTER — HOSPITAL ENCOUNTER (INPATIENT)
Facility: HOSPITAL | Age: 54
LOS: 4 days | DRG: 948 | End: 2019-01-01
Attending: INTERNAL MEDICINE | Admitting: INTERNAL MEDICINE
Payer: OTHER MISCELLANEOUS

## 2019-01-01 ENCOUNTER — ANESTHESIA (OUTPATIENT)
Dept: ENDOSCOPY | Facility: HOSPITAL | Age: 54
DRG: 444 | End: 2019-01-01
Payer: COMMERCIAL

## 2019-01-01 ENCOUNTER — OFFICE VISIT (OUTPATIENT)
Dept: INTERVENTIONAL RADIOLOGY/VASCULAR | Facility: HOSPITAL | Age: 54
End: 2019-01-01
Attending: RADIOLOGY
Payer: COMMERCIAL

## 2019-01-01 ENCOUNTER — HOSPITAL ENCOUNTER (OUTPATIENT)
Dept: MRI IMAGING | Facility: HOSPITAL | Age: 54
Discharge: HOME | End: 2019-01-01
Attending: RADIOLOGY
Payer: COMMERCIAL

## 2019-01-01 ENCOUNTER — HOSPITAL ENCOUNTER (OUTPATIENT)
Dept: NUCLEAR MEDICINE | Facility: HOSPITAL | Age: 54
Discharge: HOME | End: 2019-01-01
Attending: RADIOLOGY
Payer: COMMERCIAL

## 2019-01-01 ENCOUNTER — APPOINTMENT (OUTPATIENT)
Dept: GENERAL RADIOLOGY | Facility: HOSPITAL | Age: 54
DRG: 444 | End: 2019-01-01
Attending: THORACIC SURGERY (CARDIOTHORACIC VASCULAR SURGERY)
Payer: COMMERCIAL

## 2019-01-01 ENCOUNTER — HOSPITAL ENCOUNTER (OUTPATIENT)
Dept: CT IMAGING | Facility: HOSPITAL | Age: 54
Discharge: HOME | End: 2019-01-01
Attending: RADIOLOGY
Payer: COMMERCIAL

## 2019-01-01 ENCOUNTER — HOSPITAL ENCOUNTER (OUTPATIENT)
Facility: HOSPITAL | Age: 54
Setting detail: OBSERVATION
Discharge: HOSPICE | End: 2019-01-01
Attending: EMERGENCY MEDICINE | Admitting: INTERNAL MEDICINE
Payer: COMMERCIAL

## 2019-01-01 ENCOUNTER — ANESTHESIA EVENT (OUTPATIENT)
Dept: MEDSURG UNIT | Facility: HOSPITAL | Age: 54
End: 2019-01-01

## 2019-01-01 ENCOUNTER — APPOINTMENT (OUTPATIENT)
Dept: GENERAL RADIOLOGY | Facility: HOSPITAL | Age: 54
DRG: 444 | End: 2019-01-01
Attending: CLINICAL NURSE SPECIALIST
Payer: COMMERCIAL

## 2019-01-01 ENCOUNTER — ANESTHESIA (OUTPATIENT)
Dept: SURGERY | Facility: HOSPITAL | Age: 54
DRG: 339 | End: 2019-01-01
Payer: COMMERCIAL

## 2019-01-01 ENCOUNTER — APPOINTMENT (OUTPATIENT)
Dept: PICC SERVICES | Facility: HOSPITAL | Age: 54
DRG: 444 | End: 2019-01-01
Attending: PHYSICIAN ASSISTANT
Payer: COMMERCIAL

## 2019-01-01 ENCOUNTER — ANESTHESIA (OUTPATIENT)
Dept: MEDSURG UNIT | Facility: HOSPITAL | Age: 54
End: 2019-01-01

## 2019-01-01 ENCOUNTER — TELEPHONE (OUTPATIENT)
Dept: PULMONOLOGY | Facility: CLINIC | Age: 54
End: 2019-01-01

## 2019-01-01 ENCOUNTER — LAB ENCOUNTER (OUTPATIENT)
Dept: LAB | Facility: HOSPITAL | Age: 54
End: 2019-01-01
Attending: RADIOLOGY
Payer: COMMERCIAL

## 2019-01-01 ENCOUNTER — HOSPITAL ENCOUNTER (OUTPATIENT)
Dept: INTERVENTIONAL RADIOLOGY/VASCULAR | Facility: HOSPITAL | Age: 54
Discharge: HOME | End: 2019-01-01
Attending: RADIOLOGY | Admitting: RADIOLOGY
Payer: COMMERCIAL

## 2019-01-01 ENCOUNTER — HOSPITAL ENCOUNTER (INPATIENT)
Facility: HOSPITAL | Age: 54
LOS: 2 days | Discharge: HOSPICE | DRG: 871 | End: 2019-01-01
Attending: EMERGENCY MEDICINE | Admitting: INTERNAL MEDICINE
Payer: COMMERCIAL

## 2019-01-01 ENCOUNTER — ANESTHESIA EVENT (OUTPATIENT)
Dept: INTERVENTIONAL RADIOLOGY/VASCULAR | Facility: HOSPITAL | Age: 54
DRG: 908 | End: 2019-01-01
Payer: COMMERCIAL

## 2019-01-01 ENCOUNTER — APPOINTMENT (OUTPATIENT)
Dept: MRI IMAGING | Facility: HOSPITAL | Age: 54
DRG: 444 | End: 2019-01-01
Attending: CLINICAL NURSE SPECIALIST
Payer: COMMERCIAL

## 2019-01-01 ENCOUNTER — HOSPITAL ENCOUNTER (OUTPATIENT)
Dept: INTERVENTIONAL RADIOLOGY/VASCULAR | Facility: HOSPITAL | Age: 54
Discharge: HOME | End: 2019-01-01
Attending: CLINICAL NURSE SPECIALIST
Payer: COMMERCIAL

## 2019-01-01 ENCOUNTER — HOSPITAL ENCOUNTER (INPATIENT)
Dept: INTERVENTIONAL RADIOLOGY/VASCULAR | Facility: HOSPITAL | Age: 54
LOS: 3 days | Discharge: HOME HEALTH | DRG: 435 | End: 2019-01-01
Attending: RADIOLOGY | Admitting: RADIOLOGY
Payer: COMMERCIAL

## 2019-01-01 ENCOUNTER — HOSPITAL ENCOUNTER (OUTPATIENT)
Dept: NUCLEAR MEDICINE | Facility: HOSPITAL | Age: 54
Discharge: HOME | End: 2019-01-01
Attending: INTERNAL MEDICINE
Payer: COMMERCIAL

## 2019-01-01 ENCOUNTER — ANESTHESIA (OUTPATIENT)
Dept: SURGERY | Facility: HOSPITAL | Age: 54
DRG: 444 | End: 2019-01-01
Payer: COMMERCIAL

## 2019-01-01 ENCOUNTER — APPOINTMENT (OUTPATIENT)
Dept: LAB | Facility: HOSPITAL | Age: 54
End: 2019-01-01
Attending: RADIOLOGY
Payer: COMMERCIAL

## 2019-01-01 ENCOUNTER — APPOINTMENT (OUTPATIENT)
Dept: GENERAL RADIOLOGY | Facility: HOSPITAL | Age: 54
DRG: 871 | End: 2019-01-01
Attending: EMERGENCY MEDICINE
Payer: COMMERCIAL

## 2019-01-01 ENCOUNTER — ANESTHESIA (OUTPATIENT)
Dept: INTERVENTIONAL RADIOLOGY/VASCULAR | Facility: HOSPITAL | Age: 54
DRG: 435 | End: 2019-01-01
Payer: COMMERCIAL

## 2019-01-01 ENCOUNTER — APPOINTMENT (OUTPATIENT)
Dept: MRI IMAGING | Facility: HOSPITAL | Age: 54
DRG: 444 | End: 2019-01-01
Attending: INTERNAL MEDICINE
Payer: COMMERCIAL

## 2019-01-01 ENCOUNTER — APPOINTMENT (OUTPATIENT)
Dept: CT IMAGING | Facility: HOSPITAL | Age: 54
DRG: 444 | End: 2019-01-01
Attending: INTERNAL MEDICINE
Payer: COMMERCIAL

## 2019-01-01 ENCOUNTER — HOSPITAL ENCOUNTER (EMERGENCY)
Facility: HOSPITAL | Age: 54
Discharge: HOME | End: 2019-01-01
Attending: EMERGENCY MEDICINE
Payer: COMMERCIAL

## 2019-01-01 ENCOUNTER — MED REC SCAN ONLY (OUTPATIENT)
Dept: GASTROENTEROLOGY | Facility: CLINIC | Age: 54
End: 2019-01-01

## 2019-01-01 ENCOUNTER — APPOINTMENT (OUTPATIENT)
Dept: GENERAL RADIOLOGY | Facility: HOSPITAL | Age: 54
DRG: 908 | End: 2019-01-01
Attending: INTERNAL MEDICINE
Payer: COMMERCIAL

## 2019-01-01 ENCOUNTER — HOSPITAL ENCOUNTER (INPATIENT)
Facility: HOSPITAL | Age: 54
LOS: 27 days | Discharge: HOME HEALTH | DRG: 444 | End: 2019-01-01
Attending: EMERGENCY MEDICINE | Admitting: INTERNAL MEDICINE
Payer: COMMERCIAL

## 2019-01-01 ENCOUNTER — HOSPITAL ENCOUNTER (INPATIENT)
Dept: INTERVENTIONAL RADIOLOGY/VASCULAR | Facility: HOSPITAL | Age: 54
Discharge: HOME | DRG: 435 | End: 2019-01-01
Attending: CLINICAL NURSE SPECIALIST | Admitting: RADIOLOGY
Payer: COMMERCIAL

## 2019-01-01 ENCOUNTER — HOSPITAL ENCOUNTER (OUTPATIENT)
Dept: CT IMAGING | Facility: HOSPITAL | Age: 54
Discharge: HOME | End: 2019-01-01
Attending: INTERNAL MEDICINE
Payer: COMMERCIAL

## 2019-01-01 ENCOUNTER — APPOINTMENT (OUTPATIENT)
Dept: CT IMAGING | Facility: HOSPITAL | Age: 54
DRG: 339 | End: 2019-01-01
Attending: SURGERY
Payer: COMMERCIAL

## 2019-01-01 ENCOUNTER — HOSPITAL ENCOUNTER (INPATIENT)
Dept: INTERVENTIONAL RADIOLOGY/VASCULAR | Facility: HOSPITAL | Age: 54
LOS: 1 days | Discharge: HOME | DRG: 908 | End: 2019-01-01
Attending: RADIOLOGY | Admitting: RADIOLOGY
Payer: COMMERCIAL

## 2019-01-01 ENCOUNTER — APPOINTMENT (OUTPATIENT)
Dept: CT IMAGING | Facility: HOSPITAL | Age: 54
DRG: 908 | End: 2019-01-01
Attending: RADIOLOGY
Payer: COMMERCIAL

## 2019-01-01 VITALS
TEMPERATURE: 99 F | OXYGEN SATURATION: 98 % | SYSTOLIC BLOOD PRESSURE: 121 MMHG | HEART RATE: 108 BPM | BODY MASS INDEX: 21 KG/M2 | RESPIRATION RATE: 18 BRPM | WEIGHT: 136.25 LBS | DIASTOLIC BLOOD PRESSURE: 72 MMHG

## 2019-01-01 VITALS
HEART RATE: 67 BPM | TEMPERATURE: 97 F | DIASTOLIC BLOOD PRESSURE: 70 MMHG | OXYGEN SATURATION: 96 % | SYSTOLIC BLOOD PRESSURE: 107 MMHG | RESPIRATION RATE: 10 BRPM

## 2019-01-01 VITALS
HEIGHT: 67 IN | HEART RATE: 104 BPM | WEIGHT: 135 LBS | TEMPERATURE: 98 F | SYSTOLIC BLOOD PRESSURE: 131 MMHG | BODY MASS INDEX: 21.19 KG/M2 | DIASTOLIC BLOOD PRESSURE: 85 MMHG | OXYGEN SATURATION: 92 % | RESPIRATION RATE: 18 BRPM

## 2019-01-01 VITALS
OXYGEN SATURATION: 98 % | DIASTOLIC BLOOD PRESSURE: 82 MMHG | HEART RATE: 91 BPM | RESPIRATION RATE: 18 BRPM | HEIGHT: 67 IN | TEMPERATURE: 98 F | TEMPERATURE: 99 F | OXYGEN SATURATION: 97 % | DIASTOLIC BLOOD PRESSURE: 86 MMHG | RESPIRATION RATE: 18 BRPM | SYSTOLIC BLOOD PRESSURE: 143 MMHG | BODY MASS INDEX: 23.22 KG/M2 | BODY MASS INDEX: 22.84 KG/M2 | HEART RATE: 83 BPM | HEIGHT: 67 IN | WEIGHT: 145.5 LBS | WEIGHT: 147.94 LBS | SYSTOLIC BLOOD PRESSURE: 140 MMHG

## 2019-01-01 VITALS
WEIGHT: 152.38 LBS | TEMPERATURE: 98 F | RESPIRATION RATE: 18 BRPM | SYSTOLIC BLOOD PRESSURE: 133 MMHG | OXYGEN SATURATION: 98 % | BODY MASS INDEX: 24 KG/M2 | DIASTOLIC BLOOD PRESSURE: 87 MMHG | HEART RATE: 80 BPM

## 2019-01-01 VITALS
WEIGHT: 151 LBS | OXYGEN SATURATION: 98 % | HEART RATE: 86 BPM | BODY MASS INDEX: 24 KG/M2 | TEMPERATURE: 98 F | SYSTOLIC BLOOD PRESSURE: 115 MMHG | DIASTOLIC BLOOD PRESSURE: 79 MMHG | RESPIRATION RATE: 16 BRPM

## 2019-01-01 VITALS
WEIGHT: 140 LBS | OXYGEN SATURATION: 99 % | BODY MASS INDEX: 22 KG/M2 | HEART RATE: 71 BPM | SYSTOLIC BLOOD PRESSURE: 116 MMHG | RESPIRATION RATE: 17 BRPM | DIASTOLIC BLOOD PRESSURE: 85 MMHG

## 2019-01-01 VITALS
BODY MASS INDEX: 22 KG/M2 | OXYGEN SATURATION: 99 % | WEIGHT: 140 LBS | DIASTOLIC BLOOD PRESSURE: 78 MMHG | RESPIRATION RATE: 20 BRPM | TEMPERATURE: 98 F | HEART RATE: 82 BPM | SYSTOLIC BLOOD PRESSURE: 116 MMHG

## 2019-01-01 VITALS
HEIGHT: 67 IN | HEART RATE: 77 BPM | BODY MASS INDEX: 23.54 KG/M2 | DIASTOLIC BLOOD PRESSURE: 92 MMHG | RESPIRATION RATE: 15 BRPM | SYSTOLIC BLOOD PRESSURE: 138 MMHG | WEIGHT: 150 LBS | OXYGEN SATURATION: 99 %

## 2019-01-01 VITALS
WEIGHT: 145.44 LBS | HEART RATE: 91 BPM | TEMPERATURE: 98 F | DIASTOLIC BLOOD PRESSURE: 67 MMHG | RESPIRATION RATE: 18 BRPM | OXYGEN SATURATION: 98 % | SYSTOLIC BLOOD PRESSURE: 106 MMHG | HEIGHT: 67 IN | BODY MASS INDEX: 22.83 KG/M2

## 2019-01-01 VITALS
TEMPERATURE: 99 F | HEART RATE: 86 BPM | BODY MASS INDEX: 21.97 KG/M2 | SYSTOLIC BLOOD PRESSURE: 124 MMHG | OXYGEN SATURATION: 98 % | RESPIRATION RATE: 16 BRPM | HEIGHT: 67 IN | DIASTOLIC BLOOD PRESSURE: 80 MMHG | WEIGHT: 140 LBS

## 2019-01-01 VITALS
SYSTOLIC BLOOD PRESSURE: 103 MMHG | WEIGHT: 140 LBS | RESPIRATION RATE: 18 BRPM | DIASTOLIC BLOOD PRESSURE: 73 MMHG | HEIGHT: 67 IN | TEMPERATURE: 99 F | HEART RATE: 88 BPM | BODY MASS INDEX: 21.97 KG/M2 | OXYGEN SATURATION: 97 %

## 2019-01-01 VITALS
RESPIRATION RATE: 12 BRPM | HEART RATE: 75 BPM | SYSTOLIC BLOOD PRESSURE: 125 MMHG | OXYGEN SATURATION: 99 % | BODY MASS INDEX: 22 KG/M2 | WEIGHT: 143 LBS | DIASTOLIC BLOOD PRESSURE: 83 MMHG

## 2019-01-01 VITALS
HEART RATE: 109 BPM | SYSTOLIC BLOOD PRESSURE: 128 MMHG | RESPIRATION RATE: 20 BRPM | DIASTOLIC BLOOD PRESSURE: 87 MMHG | OXYGEN SATURATION: 100 % | TEMPERATURE: 99 F

## 2019-01-01 VITALS
SYSTOLIC BLOOD PRESSURE: 102 MMHG | BODY MASS INDEX: 21 KG/M2 | TEMPERATURE: 98 F | DIASTOLIC BLOOD PRESSURE: 83 MMHG | WEIGHT: 136.31 LBS | HEART RATE: 80 BPM | RESPIRATION RATE: 18 BRPM | OXYGEN SATURATION: 97 %

## 2019-01-01 DIAGNOSIS — C22.0 HEPATOCELLULAR CARCINOMA (HCC): ICD-10-CM

## 2019-01-01 DIAGNOSIS — K83.1 OBSTRUCTIVE JAUNDICE: ICD-10-CM

## 2019-01-01 DIAGNOSIS — M89.9 RIB LESION: Primary | ICD-10-CM

## 2019-01-01 DIAGNOSIS — C78.00 MALIGNANT NEOPLASM METASTATIC TO LUNG, UNSPECIFIED LATERALITY (HCC): ICD-10-CM

## 2019-01-01 DIAGNOSIS — A41.89 SEPSIS DUE TO OTHER ETIOLOGY (HCC): Primary | ICD-10-CM

## 2019-01-01 DIAGNOSIS — C22.0 HEPATOCELLULAR CARCINOMA (HCC): Primary | ICD-10-CM

## 2019-01-01 DIAGNOSIS — R10.9 INTRACTABLE ABDOMINAL PAIN: Primary | ICD-10-CM

## 2019-01-01 DIAGNOSIS — M89.9 RIB LESION: ICD-10-CM

## 2019-01-01 DIAGNOSIS — J90 PLEURAL EFFUSION: Primary | ICD-10-CM

## 2019-01-01 DIAGNOSIS — L50.9 URTICARIA: Primary | ICD-10-CM

## 2019-01-01 DIAGNOSIS — R21 RASH AND OTHER NONSPECIFIC SKIN ERUPTION: ICD-10-CM

## 2019-01-01 DIAGNOSIS — B15.9 VIRAL HEPATITIS A WITHOUT HEPATIC COMA: ICD-10-CM

## 2019-01-01 DIAGNOSIS — K35.80 APPENDICITIS, ACUTE: ICD-10-CM

## 2019-01-01 DIAGNOSIS — C22.0 CARCINOMA OF LIVER (HCC): ICD-10-CM

## 2019-01-01 DIAGNOSIS — Z51.11 CHEMOTHERAPY MANAGEMENT, ENCOUNTER FOR: ICD-10-CM

## 2019-01-01 DIAGNOSIS — B37.0 THRUSH, ORAL: ICD-10-CM

## 2019-01-01 DIAGNOSIS — K35.80 ACUTE APPENDICITIS, UNSPECIFIED ACUTE APPENDICITIS TYPE: Primary | ICD-10-CM

## 2019-01-01 DIAGNOSIS — C78.00 MALIGNANT NEOPLASM METASTATIC TO LUNG, UNSPECIFIED LATERALITY (HCC): Primary | ICD-10-CM

## 2019-01-01 DIAGNOSIS — L50.8 AQUAGENIC URTICARIA: Primary | ICD-10-CM

## 2019-01-01 DIAGNOSIS — R74.01 TRANSAMINITIS: ICD-10-CM

## 2019-01-01 DIAGNOSIS — K83.1 OBSTRUCTIVE JAUNDICE DUE TO CANCER (HCC): Primary | ICD-10-CM

## 2019-01-01 DIAGNOSIS — C22.0 CARCINOMA OF LIVER (HCC): Primary | ICD-10-CM

## 2019-01-01 DIAGNOSIS — G89.3 CANCER RELATED PAIN: ICD-10-CM

## 2019-01-01 DIAGNOSIS — C80.1 OBSTRUCTIVE JAUNDICE DUE TO CANCER (HCC): Primary | ICD-10-CM

## 2019-01-01 DIAGNOSIS — R91.1 LUNG NODULE SEEN ON IMAGING STUDY: ICD-10-CM

## 2019-01-01 DIAGNOSIS — G89.3 CANCER ASSOCIATED PAIN: ICD-10-CM

## 2019-01-01 DIAGNOSIS — E80.6 HYPERBILIRUBINEMIA: Primary | ICD-10-CM

## 2019-01-01 DIAGNOSIS — J90 PLEURAL EFFUSION: ICD-10-CM

## 2019-01-01 LAB
AFP-TM SERPL-MCNC: 226.8 NG/ML (ref ?–8)
AFP-TM SERPL-MCNC: 252.7 NG/ML (ref ?–8)
AFP-TM SERPL-MCNC: 50.1 NG/ML (ref 0–8.9)
AFP-TM SERPL-MCNC: 53.9 NG/ML (ref ?–8)
AFP-TM SERPL-MCNC: 59.1 NG/ML (ref ?–8)
AFP-TM SERPL-MCNC: 64.7 NG/ML (ref ?–8)
ALBUMIN SERPL BCP-MCNC: 4 G/DL (ref 3.5–4.8)
ALBUMIN SERPL-MCNC: 3.2 G/DL (ref 3.4–5)
ALBUMIN SERPL-MCNC: 3.3 G/DL (ref 3.4–5)
ALBUMIN SERPL-MCNC: 3.7 G/DL (ref 3.4–5)
ALBUMIN/GLOB SERPL: 0.6 {RATIO} (ref 1–2)
ALBUMIN/GLOB SERPL: 0.7 {RATIO} (ref 1–2)
ALBUMIN/GLOB SERPL: 0.8 {RATIO} (ref 1–2)
ALBUMIN/GLOB SERPL: 0.9 {RATIO} (ref 1–2)
ALBUMIN/GLOB SERPL: 1 {RATIO} (ref 1–2)
ALP LIVER SERPL-CCNC: 168 U/L (ref 45–117)
ALP LIVER SERPL-CCNC: 215 U/L (ref 45–117)
ALP LIVER SERPL-CCNC: 432 U/L (ref 45–117)
ALP LIVER SERPL-CCNC: 614 U/L (ref 45–117)
ALP LIVER SERPL-CCNC: 620 U/L (ref 45–117)
ALP SERPL-CCNC: 135 U/L (ref 32–100)
ALT SERPL-CCNC: 109 U/L (ref 16–61)
ALT SERPL-CCNC: 116 U/L (ref 16–61)
ALT SERPL-CCNC: 116 U/L (ref 16–61)
ALT SERPL-CCNC: 29 U/L (ref 17–63)
ALT SERPL-CCNC: 58 U/L (ref 16–61)
ALT SERPL-CCNC: 74 U/L (ref 16–61)
ANION GAP SERPL CALC-SCNC: 13 MMOL/L (ref 0–18)
ANION GAP SERPL CALC-SCNC: 5 MMOL/L (ref 0–18)
ANION GAP SERPL CALC-SCNC: 5 MMOL/L (ref 0–18)
ANION GAP SERPL CALC-SCNC: 6 MMOL/L (ref 0–18)
ANION GAP SERPL CALC-SCNC: 6 MMOL/L (ref 0–18)
ANION GAP SERPL CALC-SCNC: 8 MMOL/L (ref 0–18)
ANION GAP SERPL CALC-SCNC: 9 MMOL/L (ref 0–18)
AST SERPL-CCNC: 109 U/L (ref 15–37)
AST SERPL-CCNC: 37 U/L (ref 15–41)
AST SERPL-CCNC: 50 U/L (ref 15–37)
AST SERPL-CCNC: 58 U/L (ref 15–37)
AST SERPL-CCNC: 61 U/L (ref 15–37)
AST SERPL-CCNC: 87 U/L (ref 15–37)
BASOPHILS # BLD AUTO: 0.03 X10(3) UL (ref 0–0.2)
BASOPHILS # BLD AUTO: 0.03 X10(3) UL (ref 0–0.2)
BASOPHILS # BLD AUTO: 0.06 X10(3) UL (ref 0–0.2)
BASOPHILS # BLD: 0 X10(3) UL (ref 0–0.2)
BASOPHILS # BLD: 0.1 K/UL (ref 0–0.2)
BASOPHILS NFR BLD AUTO: 0.2 %
BASOPHILS NFR BLD AUTO: 0.3 %
BASOPHILS NFR BLD AUTO: 0.7 %
BASOPHILS NFR BLD: 0 %
BASOPHILS NFR BLD: 1 %
BILIRUB DIRECT SERPL-MCNC: 0.4 MG/DL (ref 0–0.2)
BILIRUB SERPL-MCNC: 0.6 MG/DL (ref 0.1–2)
BILIRUB SERPL-MCNC: 0.6 MG/DL (ref 0.3–1.2)
BILIRUB SERPL-MCNC: 0.8 MG/DL (ref 0.1–2)
BILIRUB SERPL-MCNC: 0.9 MG/DL (ref 0.1–2)
BILIRUB SERPL-MCNC: 1 MG/DL (ref 0.1–2)
BILIRUB SERPL-MCNC: 16.2 MG/DL (ref 0.1–2)
BUN BLD-MCNC: 13 MG/DL (ref 7–18)
BUN BLD-MCNC: 16 MG/DL (ref 7–18)
BUN BLD-MCNC: 18 MG/DL (ref 7–18)
BUN BLD-MCNC: 20 MG/DL (ref 7–18)
BUN BLD-MCNC: 21 MG/DL (ref 7–18)
BUN BLD-MCNC: 23 MG/DL (ref 7–18)
BUN SERPL-MCNC: 20 MG/DL (ref 8–20)
BUN/CREAT SERPL: 12.6 (ref 10–20)
BUN/CREAT SERPL: 16.7 (ref 10–20)
BUN/CREAT SERPL: 17 (ref 10–20)
BUN/CREAT SERPL: 18.5 (ref 10–20)
BUN/CREAT SERPL: 19 (ref 10–20)
BUN/CREAT SERPL: 19.4 (ref 10–20)
BUN/CREAT SERPL: 21.5 (ref 10–20)
CALCIUM BLD-MCNC: 7.9 MG/DL (ref 8.5–10.1)
CALCIUM BLD-MCNC: 9.1 MG/DL (ref 8.5–10.1)
CALCIUM BLD-MCNC: 9.2 MG/DL (ref 8.5–10.1)
CALCIUM BLD-MCNC: 9.4 MG/DL (ref 8.5–10.1)
CALCIUM BLD-MCNC: 9.6 MG/DL (ref 8.5–10.1)
CALCIUM BLD-MCNC: 9.8 MG/DL (ref 8.5–10.1)
CALCIUM SERPL-MCNC: 9.3 MG/DL (ref 8.5–10.5)
CHLORIDE SERPL-SCNC: 103 MMOL/L (ref 98–107)
CHLORIDE SERPL-SCNC: 104 MMOL/L (ref 98–112)
CHLORIDE SERPL-SCNC: 105 MMOL/L (ref 98–107)
CHLORIDE SERPL-SCNC: 108 MMOL/L (ref 98–112)
CHLORIDE SERPL-SCNC: 115 MMOL/L (ref 98–112)
CHLORIDE SERPL-SCNC: 99 MMOL/L (ref 95–110)
CHLORIDE SERPL-SCNC: 99 MMOL/L (ref 98–112)
CO2 SERPL-SCNC: 24 MMOL/L (ref 21–32)
CO2 SERPL-SCNC: 25 MMOL/L (ref 21–32)
CO2 SERPL-SCNC: 26 MMOL/L (ref 21–32)
CO2 SERPL-SCNC: 27 MMOL/L (ref 22–32)
CO2 SERPL-SCNC: 30 MMOL/L (ref 21–32)
CO2 SERPL-SCNC: 30 MMOL/L (ref 21–32)
CO2 SERPL-SCNC: 31 MMOL/L (ref 21–32)
CREAT BLD-MCNC: 0.9 MG/DL (ref 0.5–1.5)
CREAT BLD-MCNC: 0.93 MG/DL (ref 0.7–1.3)
CREAT BLD-MCNC: 0.96 MG/DL (ref 0.7–1.3)
CREAT BLD-MCNC: 1.03 MG/DL (ref 0.7–1.3)
CREAT BLD-MCNC: 1.06 MG/DL (ref 0.7–1.3)
CREAT BLD-MCNC: 1.08 MG/DL (ref 0.7–1.3)
CREAT BLD-MCNC: 1.24 MG/DL (ref 0.7–1.3)
CREAT SERPL-MCNC: 1.05 MG/DL (ref 0.5–1.5)
DEPRECATED RDW RBC AUTO: 44.3 FL (ref 35.1–46.3)
DEPRECATED RDW RBC AUTO: 45.1 FL (ref 35.1–46.3)
DEPRECATED RDW RBC AUTO: 46.1 FL (ref 35.1–46.3)
DEPRECATED RDW RBC AUTO: 48.4 FL (ref 35.1–46.3)
DEPRECATED RDW RBC AUTO: 48.5 FL (ref 35.1–46.3)
EOSINOPHIL # BLD AUTO: 0 X10(3) UL (ref 0–0.7)
EOSINOPHIL # BLD AUTO: 0.11 X10(3) UL (ref 0–0.7)
EOSINOPHIL # BLD AUTO: 1.45 X10(3) UL (ref 0–0.7)
EOSINOPHIL # BLD: 0 X10(3) UL (ref 0–0.7)
EOSINOPHIL # BLD: 1.2 K/UL (ref 0–0.7)
EOSINOPHIL NFR BLD AUTO: 0 %
EOSINOPHIL NFR BLD AUTO: 1.1 %
EOSINOPHIL NFR BLD AUTO: 17.3 %
EOSINOPHIL NFR BLD: 0 %
EOSINOPHIL NFR BLD: 16 %
ERYTHROCYTE [DISTWIDTH] IN BLOOD BY AUTOMATED COUNT: 13.1 % (ref 11–15)
ERYTHROCYTE [DISTWIDTH] IN BLOOD BY AUTOMATED COUNT: 13.3 % (ref 11–15)
ERYTHROCYTE [DISTWIDTH] IN BLOOD BY AUTOMATED COUNT: 13.5 % (ref 11–15)
ERYTHROCYTE [DISTWIDTH] IN BLOOD BY AUTOMATED COUNT: 13.8 % (ref 11–15)
ERYTHROCYTE [DISTWIDTH] IN BLOOD BY AUTOMATED COUNT: 13.8 % (ref 11–15)
ERYTHROCYTE [DISTWIDTH] IN BLOOD BY AUTOMATED COUNT: 15.2 % (ref 11–15)
GLOBULIN PLAS-MCNC: 3.9 G/DL (ref 2.5–3.7)
GLOBULIN PLAS-MCNC: 4.3 G/DL (ref 2.8–4.4)
GLOBULIN PLAS-MCNC: 4.6 G/DL (ref 2.8–4.4)
GLOBULIN PLAS-MCNC: 4.9 G/DL (ref 2.8–4.4)
GLOBULIN PLAS-MCNC: 5.1 G/DL (ref 2.8–4.4)
GLUCOSE BLD-MCNC: 127 MG/DL (ref 70–99)
GLUCOSE BLD-MCNC: 150 MG/DL (ref 70–99)
GLUCOSE BLD-MCNC: 160 MG/DL (ref 70–99)
GLUCOSE BLD-MCNC: 164 MG/DL (ref 70–99)
GLUCOSE BLD-MCNC: 82 MG/DL (ref 70–99)
GLUCOSE BLD-MCNC: 91 MG/DL (ref 70–99)
GLUCOSE SERPL-MCNC: 127 MG/DL (ref 70–99)
HCT VFR BLD AUTO: 39.1 % (ref 39–53)
HCT VFR BLD AUTO: 42.2 % (ref 39–53)
HCT VFR BLD AUTO: 46 % (ref 39–53)
HCT VFR BLD AUTO: 46.2 % (ref 41–52)
HCT VFR BLD AUTO: 46.8 % (ref 39–53)
HCT VFR BLD AUTO: 51.1 % (ref 39–53)
HGB BLD-MCNC: 12.6 G/DL (ref 13–17.5)
HGB BLD-MCNC: 13.5 G/DL (ref 13–17.5)
HGB BLD-MCNC: 14.5 G/DL (ref 13–17.5)
HGB BLD-MCNC: 15.1 G/DL (ref 13–17.5)
HGB BLD-MCNC: 15.3 G/DL (ref 13.5–17.5)
HGB BLD-MCNC: 16.4 G/DL (ref 13–17.5)
IMM GRANULOCYTES # BLD AUTO: 0.03 X10(3) UL (ref 0–1)
IMM GRANULOCYTES # BLD AUTO: 0.05 X10(3) UL (ref 0–1)
IMM GRANULOCYTES # BLD AUTO: 0.09 X10(3) UL (ref 0–1)
IMM GRANULOCYTES NFR BLD: 0.4 %
IMM GRANULOCYTES NFR BLD: 0.5 %
IMM GRANULOCYTES NFR BLD: 0.5 %
LYMPHOCYTES # BLD AUTO: 0.34 X10(3) UL (ref 1–4)
LYMPHOCYTES # BLD AUTO: 0.43 X10(3) UL (ref 1–4)
LYMPHOCYTES # BLD AUTO: 0.62 X10(3) UL (ref 1–4)
LYMPHOCYTES # BLD: 0.5 K/UL (ref 1–4)
LYMPHOCYTES NFR BLD AUTO: 1.9 %
LYMPHOCYTES NFR BLD AUTO: 4.4 %
LYMPHOCYTES NFR BLD AUTO: 7.4 %
LYMPHOCYTES NFR BLD: 0.54 X10(3) UL (ref 1–4)
LYMPHOCYTES NFR BLD: 2 %
LYMPHOCYTES NFR BLD: 6 %
M PROTEIN MFR SERPL ELPH: 8 G/DL (ref 6.4–8.2)
M PROTEIN MFR SERPL ELPH: 8.1 G/DL (ref 6.4–8.2)
M PROTEIN MFR SERPL ELPH: 8.1 G/DL (ref 6.4–8.2)
M PROTEIN MFR SERPL ELPH: 8.3 G/DL (ref 6.4–8.2)
M PROTEIN MFR SERPL ELPH: 8.4 G/DL (ref 6.4–8.2)
MCH RBC QN AUTO: 29.5 PG (ref 26–34)
MCH RBC QN AUTO: 29.8 PG (ref 26–34)
MCH RBC QN AUTO: 29.9 PG (ref 26–34)
MCH RBC QN AUTO: 29.9 PG (ref 27–32)
MCH RBC QN AUTO: 30.3 PG (ref 26–34)
MCH RBC QN AUTO: 30.7 PG (ref 26–34)
MCHC RBC AUTO-ENTMCNC: 31 G/DL (ref 31–37)
MCHC RBC AUTO-ENTMCNC: 32 G/DL (ref 31–37)
MCHC RBC AUTO-ENTMCNC: 32.1 G/DL (ref 31–37)
MCHC RBC AUTO-ENTMCNC: 32.2 G/DL (ref 31–37)
MCHC RBC AUTO-ENTMCNC: 32.8 G/DL (ref 31–37)
MCHC RBC AUTO-ENTMCNC: 33 G/DL (ref 32–37)
MCV RBC AUTO: 90.4 FL (ref 80–100)
MCV RBC AUTO: 92.4 FL (ref 80–100)
MCV RBC AUTO: 93.1 FL (ref 80–100)
MCV RBC AUTO: 93.2 FL (ref 80–100)
MCV RBC AUTO: 95.1 FL (ref 80–100)
MCV RBC AUTO: 95.3 FL (ref 80–100)
MONOCYTES # BLD AUTO: 0.51 X10(3) UL (ref 0.1–1)
MONOCYTES # BLD AUTO: 0.77 X10(3) UL (ref 0.1–1)
MONOCYTES # BLD AUTO: 0.78 X10(3) UL (ref 0.1–1)
MONOCYTES # BLD: 0.27 X10(3) UL (ref 0.1–1)
MONOCYTES # BLD: 0.5 K/UL (ref 0–1)
MONOCYTES NFR BLD AUTO: 4.3 %
MONOCYTES NFR BLD AUTO: 5.2 %
MONOCYTES NFR BLD AUTO: 9.2 %
MONOCYTES NFR BLD: 1 %
MONOCYTES NFR BLD: 7 %
MORPHOLOGY: NORMAL
MRSA DNA SPEC QL NAA+PROBE: NEGATIVE
NEUTROPHILS # BLD AUTO: 17.07 X10 (3) UL (ref 1.5–7.7)
NEUTROPHILS # BLD AUTO: 17.07 X10(3) UL (ref 1.5–7.7)
NEUTROPHILS # BLD AUTO: 25.32 X10 (3) UL (ref 1.5–7.7)
NEUTROPHILS # BLD AUTO: 5.46 X10 (3) UL (ref 1.5–7.7)
NEUTROPHILS # BLD AUTO: 5.46 X10(3) UL (ref 1.5–7.7)
NEUTROPHILS # BLD AUTO: 5.5 K/UL (ref 1.8–7.7)
NEUTROPHILS # BLD AUTO: 8.63 X10 (3) UL (ref 1.5–7.7)
NEUTROPHILS # BLD AUTO: 8.63 X10(3) UL (ref 1.5–7.7)
NEUTROPHILS NFR BLD AUTO: 65 %
NEUTROPHILS NFR BLD AUTO: 88.5 %
NEUTROPHILS NFR BLD AUTO: 93.1 %
NEUTROPHILS NFR BLD: 70 %
NEUTROPHILS NFR BLD: 77 %
NEUTS BAND NFR BLD: 20 %
NEUTS HYPERSEG # BLD: 26.38 X10(3) UL (ref 1.5–7.7)
OSMOLALITY SERPL CALC.SUM OF ELEC: 282 MOSM/KG (ref 275–295)
OSMOLALITY SERPL CALC.SUM OF ELEC: 291 MOSM/KG (ref 275–295)
OSMOLALITY SERPL CALC.SUM OF ELEC: 292 MOSM/KG (ref 275–295)
OSMOLALITY SERPL CALC.SUM OF ELEC: 292 MOSM/KG (ref 275–295)
OSMOLALITY SERPL CALC.SUM OF ELEC: 295 MOSM/KG (ref 275–295)
OSMOLALITY SERPL CALC.SUM OF ELEC: 309 MOSM/KG (ref 275–295)
OSMOLALITY UR CALC.SUM OF ELEC: 292 MOSM/KG (ref 275–295)
PATIENT FASTING: NO
PATIENT FASTING: YES
PHOSPHATE SERPL-MCNC: 4.8 MG/DL (ref 2.4–4.7)
PLATELET # BLD AUTO: 175 10(3)UL (ref 150–450)
PLATELET # BLD AUTO: 189 10(3)UL (ref 150–450)
PLATELET # BLD AUTO: 231 K/UL (ref 140–400)
PLATELET # BLD AUTO: 263 10(3)UL (ref 150–450)
PLATELET # BLD AUTO: 280 10(3)UL (ref 150–450)
PLATELET # BLD AUTO: 339 10(3)UL (ref 150–450)
PLATELET MORPHOLOGY: NORMAL
PMV BLD AUTO: 9.1 FL (ref 7.4–10.3)
POTASSIUM SERPL-SCNC: 3.9 MMOL/L (ref 3.5–5.1)
POTASSIUM SERPL-SCNC: 4 MMOL/L (ref 3.5–5.1)
POTASSIUM SERPL-SCNC: 4.1 MMOL/L (ref 3.5–5.1)
POTASSIUM SERPL-SCNC: 4.5 MMOL/L (ref 3.5–5.1)
POTASSIUM SERPL-SCNC: 4.5 MMOL/L (ref 3.5–5.1)
POTASSIUM SERPL-SCNC: 4.6 MMOL/L (ref 3.5–5.1)
POTASSIUM SERPL-SCNC: 4.9 MMOL/L (ref 3.3–5.1)
PROT SERPL-MCNC: 7.9 G/DL (ref 5.9–8.4)
RBC # BLD AUTO: 4.11 X10(6)UL (ref 4.3–5.7)
RBC # BLD AUTO: 4.53 X10(6)UL (ref 4.3–5.7)
RBC # BLD AUTO: 4.91 X10(6)UL (ref 4.3–5.7)
RBC # BLD AUTO: 4.98 X10(6)UL (ref 4.3–5.7)
RBC # BLD AUTO: 5.11 M/UL (ref 4.5–5.9)
RBC # BLD AUTO: 5.49 X10(6)UL (ref 4.3–5.7)
SODIUM SERPL-SCNC: 136 MMOL/L (ref 136–145)
SODIUM SERPL-SCNC: 138 MMOL/L (ref 136–145)
SODIUM SERPL-SCNC: 139 MMOL/L (ref 136–144)
SODIUM SERPL-SCNC: 139 MMOL/L (ref 136–145)
SODIUM SERPL-SCNC: 140 MMOL/L (ref 136–145)
SODIUM SERPL-SCNC: 140 MMOL/L (ref 136–145)
SODIUM SERPL-SCNC: 146 MMOL/L (ref 136–145)
TOTAL CELLS COUNTED: 100
TSH SERPL-ACNC: 1.06 UIU/ML (ref 0.45–5.33)
WBC # BLD AUTO: 10.1 X10(3) UL (ref 4–11)
WBC # BLD AUTO: 18.3 X10(3) UL (ref 4–11)
WBC # BLD AUTO: 27.2 X10(3) UL (ref 4–11)
WBC # BLD AUTO: 7.9 K/UL (ref 4–11)
WBC # BLD AUTO: 8.4 X10(3) UL (ref 4–11)
WBC # BLD AUTO: 9.8 X10(3) UL (ref 4–11)

## 2019-01-01 PROCEDURE — B41J1ZZ FLUOROSCOPY OF OTHER LOWER ARTERIES USING LOW OSMOLAR CONTRAST: ICD-10-PCS | Performed by: RADIOLOGY

## 2019-01-01 PROCEDURE — 74176 CT ABD & PELVIS W/O CONTRAST: CPT | Performed by: INTERNAL MEDICINE

## 2019-01-01 PROCEDURE — 75774 ARTERY X-RAY EACH VESSEL: CPT

## 2019-01-01 PROCEDURE — 84443 ASSAY THYROID STIM HORMONE: CPT

## 2019-01-01 PROCEDURE — 0F793ZZ DILATION OF COMMON BILE DUCT, PERCUTANEOUS APPROACH: ICD-10-PCS | Performed by: RADIOLOGY

## 2019-01-01 PROCEDURE — A9575 INJ GADOTERATE MEGLUMI 0.1ML: HCPCS | Performed by: RADIOLOGY

## 2019-01-01 PROCEDURE — 99231 SBSQ HOSP IP/OBS SF/LOW 25: CPT | Performed by: INTERNAL MEDICINE

## 2019-01-01 PROCEDURE — 99232 SBSQ HOSP IP/OBS MODERATE 35: CPT | Performed by: INTERNAL MEDICINE

## 2019-01-01 PROCEDURE — 76377 3D RENDER W/INTRP POSTPROCES: CPT

## 2019-01-01 PROCEDURE — 85025 COMPLETE CBC W/AUTO DIFF WBC: CPT | Performed by: EMERGENCY MEDICINE

## 2019-01-01 PROCEDURE — 75726 ARTERY X-RAYS ABDOMEN: CPT

## 2019-01-01 PROCEDURE — 99254 IP/OBS CNSLTJ NEW/EST MOD 60: CPT | Performed by: INTERNAL MEDICINE

## 2019-01-01 PROCEDURE — 99284 EMERGENCY DEPT VISIT MOD MDM: CPT

## 2019-01-01 PROCEDURE — 80053 COMPREHEN METABOLIC PANEL: CPT

## 2019-01-01 PROCEDURE — 74183 MRI ABD W/O CNTR FLWD CNTR: CPT | Performed by: INTERNAL MEDICINE

## 2019-01-01 PROCEDURE — 96365 THER/PROPH/DIAG IV INF INIT: CPT

## 2019-01-01 PROCEDURE — 79445 NUCLEAR RX INTRA-ARTERIAL: CPT

## 2019-01-01 PROCEDURE — 0W9B3ZZ DRAINAGE OF LEFT PLEURAL CAVITY, PERCUTANEOUS APPROACH: ICD-10-PCS | Performed by: INTERNAL MEDICINE

## 2019-01-01 PROCEDURE — 78306 BONE IMAGING WHOLE BODY: CPT | Performed by: INTERNAL MEDICINE

## 2019-01-01 PROCEDURE — 71250 CT THORAX DX C-: CPT | Performed by: INTERNAL MEDICINE

## 2019-01-01 PROCEDURE — 04L33DZ OCCLUSION OF HEPATIC ARTERY WITH INTRALUMINAL DEVICE, PERCUTANEOUS APPROACH: ICD-10-PCS | Performed by: RADIOLOGY

## 2019-01-01 PROCEDURE — 36415 COLL VENOUS BLD VENIPUNCTURE: CPT

## 2019-01-01 PROCEDURE — 36573 INSJ PICC RS&I 5 YR+: CPT

## 2019-01-01 PROCEDURE — 83690 ASSAY OF LIPASE: CPT | Performed by: EMERGENCY MEDICINE

## 2019-01-01 PROCEDURE — 74330 X-RAY BILE/PANC ENDOSCOPY: CPT | Performed by: INTERNAL MEDICINE

## 2019-01-01 PROCEDURE — 99233 SBSQ HOSP IP/OBS HIGH 50: CPT | Performed by: INTERNAL MEDICINE

## 2019-01-01 PROCEDURE — 37243 VASC EMBOLIZE/OCCLUDE ORGAN: CPT

## 2019-01-01 PROCEDURE — 76376 3D RENDER W/INTRP POSTPROCES: CPT | Performed by: INTERNAL MEDICINE

## 2019-01-01 PROCEDURE — 99153 MOD SED SAME PHYS/QHP EA: CPT

## 2019-01-01 PROCEDURE — 99223 1ST HOSP IP/OBS HIGH 75: CPT | Performed by: INTERNAL MEDICINE

## 2019-01-01 PROCEDURE — 81003 URINALYSIS AUTO W/O SCOPE: CPT

## 2019-01-01 PROCEDURE — 96375 TX/PRO/DX INJ NEW DRUG ADDON: CPT

## 2019-01-01 PROCEDURE — 77012 CT SCAN FOR NEEDLE BIOPSY: CPT | Performed by: RADIOLOGY

## 2019-01-01 PROCEDURE — 71045 X-RAY EXAM CHEST 1 VIEW: CPT | Performed by: INTERNAL MEDICINE

## 2019-01-01 PROCEDURE — 77790 RADIATION HANDLING: CPT | Performed by: RADIOLOGY

## 2019-01-01 PROCEDURE — 93975 VASCULAR STUDY: CPT | Performed by: INTERNAL MEDICINE

## 2019-01-01 PROCEDURE — 99284 EMERGENCY DEPT VISIT MOD MDM: CPT | Performed by: EMERGENCY MEDICINE

## 2019-01-01 PROCEDURE — 74176 CT ABD & PELVIS W/O CONTRAST: CPT | Performed by: SURGERY

## 2019-01-01 PROCEDURE — 88307 TISSUE EXAM BY PATHOLOGIST: CPT | Performed by: RADIOLOGY

## 2019-01-01 PROCEDURE — 71046 X-RAY EXAM CHEST 2 VIEWS: CPT | Performed by: INTERNAL MEDICINE

## 2019-01-01 PROCEDURE — 99232 SBSQ HOSP IP/OBS MODERATE 35: CPT | Performed by: NURSE PRACTITIONER

## 2019-01-01 PROCEDURE — 88342 IMHCHEM/IMCYTCHM 1ST ANTB: CPT | Performed by: RADIOLOGY

## 2019-01-01 PROCEDURE — S0028 INJECTION, FAMOTIDINE, 20 MG: HCPCS | Performed by: EMERGENCY MEDICINE

## 2019-01-01 PROCEDURE — 76376 3D RENDER W/INTRP POSTPROCES: CPT | Performed by: EMERGENCY MEDICINE

## 2019-01-01 PROCEDURE — 82565 ASSAY OF CREATININE: CPT

## 2019-01-01 PROCEDURE — 82105 ALPHA-FETOPROTEIN SERUM: CPT

## 2019-01-01 PROCEDURE — 0F798DZ DILATION OF COMMON BILE DUCT WITH INTRALUMINAL DEVICE, VIA NATURAL OR ARTIFICIAL OPENING ENDOSCOPIC: ICD-10-PCS | Performed by: INTERNAL MEDICINE

## 2019-01-01 PROCEDURE — 96374 THER/PROPH/DIAG INJ IV PUSH: CPT | Performed by: EMERGENCY MEDICINE

## 2019-01-01 PROCEDURE — 0WP8X0Z REMOVAL OF DRAINAGE DEVICE FROM CHEST WALL, EXTERNAL APPROACH: ICD-10-PCS | Performed by: RADIOLOGY

## 2019-01-01 PROCEDURE — 74181 MRI ABDOMEN W/O CONTRAST: CPT | Performed by: CLINICAL NURSE SPECIALIST

## 2019-01-01 PROCEDURE — 85652 RBC SED RATE AUTOMATED: CPT

## 2019-01-01 PROCEDURE — 80048 BASIC METABOLIC PNL TOTAL CA: CPT | Performed by: EMERGENCY MEDICINE

## 2019-01-01 PROCEDURE — 76700 US EXAM ABDOM COMPLETE: CPT | Performed by: INTERNAL MEDICINE

## 2019-01-01 PROCEDURE — 99152 MOD SED SAME PHYS/QHP 5/>YRS: CPT | Performed by: RADIOLOGY

## 2019-01-01 PROCEDURE — 74176 CT ABD & PELVIS W/O CONTRAST: CPT | Performed by: EMERGENCY MEDICINE

## 2019-01-01 PROCEDURE — 88333 PATH CONSLTJ SURG CYTO XM 1: CPT | Performed by: RADIOLOGY

## 2019-01-01 PROCEDURE — 96376 TX/PRO/DX INJ SAME DRUG ADON: CPT

## 2019-01-01 PROCEDURE — 85025 COMPLETE CBC W/AUTO DIFF WBC: CPT

## 2019-01-01 PROCEDURE — 36248 INS CATH ABD/L-EXT ART ADDL: CPT

## 2019-01-01 PROCEDURE — 99253 IP/OBS CNSLTJ NEW/EST LOW 45: CPT | Performed by: INTERNAL MEDICINE

## 2019-01-01 PROCEDURE — 4350F CNSLNG PROVIDED SYMP MNGMNT: CPT | Performed by: REGISTERED NURSE

## 2019-01-01 PROCEDURE — 0F9930Z DRAINAGE OF COMMON BILE DUCT WITH DRAINAGE DEVICE, PERCUTANEOUS APPROACH: ICD-10-PCS | Performed by: RADIOLOGY

## 2019-01-01 PROCEDURE — 74019 RADEX ABDOMEN 2 VIEWS: CPT | Performed by: INTERNAL MEDICINE

## 2019-01-01 PROCEDURE — 99291 CRITICAL CARE FIRST HOUR: CPT | Performed by: INTERNAL MEDICINE

## 2019-01-01 PROCEDURE — 88334 PATH CONSLTJ SURG CYTO XM EA: CPT | Performed by: RADIOLOGY

## 2019-01-01 PROCEDURE — 99231 SBSQ HOSP IP/OBS SF/LOW 25: CPT | Performed by: NURSE PRACTITIONER

## 2019-01-01 PROCEDURE — 47534 PLMT BILIARY DRAINAGE CATH: CPT

## 2019-01-01 PROCEDURE — 77001 FLUOROGUIDE FOR VEIN DEVICE: CPT | Performed by: THORACIC SURGERY (CARDIOTHORACIC VASCULAR SURGERY)

## 2019-01-01 PROCEDURE — 4A02X4A MEASUREMENT OF CARDIAC ELECTRICAL ACTIVITY, GUIDANCE, EXTERNAL APPROACH: ICD-10-PCS | Performed by: INTERNAL MEDICINE

## 2019-01-01 PROCEDURE — 99153 MOD SED SAME PHYS/QHP EA: CPT | Performed by: RADIOLOGY

## 2019-01-01 PROCEDURE — 0W9B30Z DRAINAGE OF LEFT PLEURAL CAVITY WITH DRAINAGE DEVICE, PERCUTANEOUS APPROACH: ICD-10-PCS | Performed by: THORACIC SURGERY (CARDIOTHORACIC VASCULAR SURGERY)

## 2019-01-01 PROCEDURE — 81001 URINALYSIS AUTO W/SCOPE: CPT | Performed by: EMERGENCY MEDICINE

## 2019-01-01 PROCEDURE — 71250 CT THORAX DX C-: CPT | Performed by: RADIOLOGY

## 2019-01-01 PROCEDURE — 3E053HZ INTRODUCTION OF RADIOACTIVE SUBSTANCE INTO PERIPHERAL ARTERY, PERCUTANEOUS APPROACH: ICD-10-PCS | Performed by: RADIOLOGY

## 2019-01-01 PROCEDURE — 71045 X-RAY EXAM CHEST 1 VIEW: CPT | Performed by: PHYSICIAN ASSISTANT

## 2019-01-01 PROCEDURE — 88311 DECALCIFY TISSUE: CPT | Performed by: RADIOLOGY

## 2019-01-01 PROCEDURE — B4121ZZ FLUOROSCOPY OF HEPATIC ARTERY USING LOW OSMOLAR CONTRAST: ICD-10-PCS | Performed by: RADIOLOGY

## 2019-01-01 PROCEDURE — 36247 INS CATH ABD/L-EXT ART 3RD: CPT

## 2019-01-01 PROCEDURE — 99255 IP/OBS CONSLTJ NEW/EST HI 80: CPT | Performed by: INTERNAL MEDICINE

## 2019-01-01 PROCEDURE — 99285 EMERGENCY DEPT VISIT HI MDM: CPT

## 2019-01-01 PROCEDURE — 84100 ASSAY OF PHOSPHORUS: CPT

## 2019-01-01 PROCEDURE — 02HV33Z INSERTION OF INFUSION DEVICE INTO SUPERIOR VENA CAVA, PERCUTANEOUS APPROACH: ICD-10-PCS | Performed by: INTERNAL MEDICINE

## 2019-01-01 PROCEDURE — 99152 MOD SED SAME PHYS/QHP 5/>YRS: CPT

## 2019-01-01 PROCEDURE — 43274 ERCP DUCT STENT PLACEMENT: CPT | Performed by: INTERNAL MEDICINE

## 2019-01-01 PROCEDURE — 99215 OFFICE O/P EST HI 40 MIN: CPT | Performed by: INTERNAL MEDICINE

## 2019-01-01 PROCEDURE — 86901 BLOOD TYPING SEROLOGIC RH(D): CPT | Performed by: ANESTHESIOLOGY

## 2019-01-01 PROCEDURE — 80076 HEPATIC FUNCTION PANEL: CPT | Performed by: EMERGENCY MEDICINE

## 2019-01-01 PROCEDURE — 74183 MRI ABD W/O CNTR FLWD CNTR: CPT | Performed by: RADIOLOGY

## 2019-01-01 PROCEDURE — 47542 DILATE BILIARY DUCT/AMPULLA: CPT

## 2019-01-01 PROCEDURE — 86140 C-REACTIVE PROTEIN: CPT

## 2019-01-01 PROCEDURE — BF101ZZ FLUOROSCOPY OF BILE DUCTS USING LOW OSMOLAR CONTRAST: ICD-10-PCS | Performed by: RADIOLOGY

## 2019-01-01 PROCEDURE — 20220 BONE BIOPSY TROCAR/NDL SUPFC: CPT | Performed by: RADIOLOGY

## 2019-01-01 PROCEDURE — 96374 THER/PROPH/DIAG INJ IV PUSH: CPT

## 2019-01-01 PROCEDURE — 96375 TX/PRO/DX INJ NEW DRUG ADDON: CPT | Performed by: EMERGENCY MEDICINE

## 2019-01-01 PROCEDURE — 32554 ASPIRATE PLEURA W/O IMAGING: CPT | Performed by: INTERNAL MEDICINE

## 2019-01-01 PROCEDURE — 96361 HYDRATE IV INFUSION ADD-ON: CPT

## 2019-01-01 PROCEDURE — 71045 X-RAY EXAM CHEST 1 VIEW: CPT | Performed by: EMERGENCY MEDICINE

## 2019-01-01 PROCEDURE — 0DTJ4ZZ RESECTION OF APPENDIX, PERCUTANEOUS ENDOSCOPIC APPROACH: ICD-10-PCS | Performed by: SURGERY

## 2019-01-01 PROCEDURE — B412YZZ FLUOROSCOPY OF HEPATIC ARTERY USING OTHER CONTRAST: ICD-10-PCS | Performed by: RADIOLOGY

## 2019-01-01 PROCEDURE — 74181 MRI ABDOMEN W/O CONTRAST: CPT | Performed by: EMERGENCY MEDICINE

## 2019-01-01 PROCEDURE — 71045 X-RAY EXAM CHEST 1 VIEW: CPT | Performed by: CLINICAL NURSE SPECIALIST

## 2019-01-01 PROCEDURE — 86900 BLOOD TYPING SEROLOGIC ABO: CPT | Performed by: ANESTHESIOLOGY

## 2019-01-01 PROCEDURE — 84439 ASSAY OF FREE THYROXINE: CPT

## 2019-01-01 PROCEDURE — BF101ZZ FLUOROSCOPY OF BILE DUCTS USING LOW OSMOLAR CONTRAST: ICD-10-PCS | Performed by: INTERNAL MEDICINE

## 2019-01-01 PROCEDURE — 86850 RBC ANTIBODY SCREEN: CPT | Performed by: ANESTHESIOLOGY

## 2019-01-01 PROCEDURE — 96372 THER/PROPH/DIAG INJ SC/IM: CPT

## 2019-01-01 RX ORDER — HEPARIN SODIUM 5000 [USP'U]/ML
5000 INJECTION, SOLUTION INTRAVENOUS; SUBCUTANEOUS EVERY 12 HOURS SCHEDULED
Status: DISCONTINUED | OUTPATIENT
Start: 2019-01-01 | End: 2019-01-01

## 2019-01-01 RX ORDER — METOCLOPRAMIDE HYDROCHLORIDE 5 MG/ML
INJECTION INTRAMUSCULAR; INTRAVENOUS
Status: DISPENSED
Start: 2019-01-01 | End: 2019-01-01

## 2019-01-01 RX ORDER — LORAZEPAM 2 MG/ML
0.5 INJECTION INTRAMUSCULAR EVERY 4 HOURS PRN
Status: CANCELLED | OUTPATIENT
Start: 2019-01-01

## 2019-01-01 RX ORDER — SODIUM CHLORIDE 9 MG/ML
INJECTION, SOLUTION INTRAVENOUS CONTINUOUS
Status: DISCONTINUED | OUTPATIENT
Start: 2019-01-01 | End: 2019-01-01

## 2019-01-01 RX ORDER — HYDROMORPHONE HYDROCHLORIDE 1 MG/ML
INJECTION, SOLUTION INTRAMUSCULAR; INTRAVENOUS; SUBCUTANEOUS
Status: DISCONTINUED
Start: 2019-01-01 | End: 2019-01-01 | Stop reason: WASHOUT

## 2019-01-01 RX ORDER — HYDROMORPHONE HYDROCHLORIDE 1 MG/ML
1.2 INJECTION, SOLUTION INTRAMUSCULAR; INTRAVENOUS; SUBCUTANEOUS EVERY 2 HOUR PRN
Status: DISCONTINUED | OUTPATIENT
Start: 2019-01-01 | End: 2019-01-01

## 2019-01-01 RX ORDER — FUROSEMIDE 10 MG/ML
40 INJECTION INTRAMUSCULAR; INTRAVENOUS EVERY 8 HOURS PRN
Status: DISCONTINUED | OUTPATIENT
Start: 2019-01-01 | End: 2019-01-01

## 2019-01-01 RX ORDER — HYDROMORPHONE HYDROCHLORIDE 1 MG/ML
0.6 INJECTION, SOLUTION INTRAMUSCULAR; INTRAVENOUS; SUBCUTANEOUS EVERY 5 MIN PRN
Status: DISCONTINUED | OUTPATIENT
Start: 2019-01-01 | End: 2019-01-01 | Stop reason: HOSPADM

## 2019-01-01 RX ORDER — HYDROMORPHONE HYDROCHLORIDE 1 MG/ML
0.4 INJECTION, SOLUTION INTRAMUSCULAR; INTRAVENOUS; SUBCUTANEOUS
Status: DISCONTINUED | OUTPATIENT
Start: 2019-01-01 | End: 2019-01-01

## 2019-01-01 RX ORDER — HALOPERIDOL 1 MG/1
1 TABLET ORAL
Status: DISCONTINUED | OUTPATIENT
Start: 2019-01-01 | End: 2019-01-01

## 2019-01-01 RX ORDER — ACETAMINOPHEN 160 MG/5ML
650 SOLUTION ORAL EVERY 6 HOURS SCHEDULED
Status: DISCONTINUED | OUTPATIENT
Start: 2019-01-01 | End: 2019-01-01

## 2019-01-01 RX ORDER — ACETAMINOPHEN 650 MG/1
650 SUPPOSITORY RECTAL EVERY 6 HOURS PRN
Status: DISCONTINUED | OUTPATIENT
Start: 2019-01-01 | End: 2019-01-01

## 2019-01-01 RX ORDER — LORAZEPAM 2 MG/ML
1 INJECTION INTRAMUSCULAR EVERY 4 HOURS PRN
Status: DISCONTINUED | OUTPATIENT
Start: 2019-01-01 | End: 2019-01-01

## 2019-01-01 RX ORDER — SODIUM CHLORIDE, SODIUM LACTATE, POTASSIUM CHLORIDE, CALCIUM CHLORIDE 600; 310; 30; 20 MG/100ML; MG/100ML; MG/100ML; MG/100ML
INJECTION, SOLUTION INTRAVENOUS CONTINUOUS
Status: DISCONTINUED | OUTPATIENT
Start: 2019-01-01 | End: 2019-01-01

## 2019-01-01 RX ORDER — KETAMINE HYDROCHLORIDE 50 MG/ML
35 INJECTION, SOLUTION, CONCENTRATE INTRAMUSCULAR; INTRAVENOUS ONCE
Status: COMPLETED | OUTPATIENT
Start: 2019-01-01 | End: 2019-01-01

## 2019-01-01 RX ORDER — PROCHLORPERAZINE MALEATE 10 MG
10 TABLET ORAL EVERY 6 HOURS PRN
Status: DISCONTINUED | OUTPATIENT
Start: 2019-01-01 | End: 2019-01-01

## 2019-01-01 RX ORDER — HALOPERIDOL 1 MG/1
2 TABLET ORAL
Status: DISCONTINUED | OUTPATIENT
Start: 2019-01-01 | End: 2019-01-01

## 2019-01-01 RX ORDER — BISACODYL 10 MG
10 SUPPOSITORY, RECTAL RECTAL
Status: DISCONTINUED | OUTPATIENT
Start: 2019-01-01 | End: 2019-01-01

## 2019-01-01 RX ORDER — DIPHENHYDRAMINE HYDROCHLORIDE 50 MG/ML
INJECTION INTRAMUSCULAR; INTRAVENOUS
Status: COMPLETED
Start: 2019-01-01 | End: 2019-01-01

## 2019-01-01 RX ORDER — ONDANSETRON 2 MG/ML
INJECTION INTRAMUSCULAR; INTRAVENOUS
Status: DISCONTINUED
Start: 2019-01-01 | End: 2019-01-01

## 2019-01-01 RX ORDER — LORAZEPAM 2 MG/ML
0.5 INJECTION INTRAMUSCULAR EVERY 4 HOURS PRN
Status: DISCONTINUED | OUTPATIENT
Start: 2019-01-01 | End: 2019-01-01

## 2019-01-01 RX ORDER — METOCLOPRAMIDE HYDROCHLORIDE 5 MG/ML
10 INJECTION INTRAMUSCULAR; INTRAVENOUS EVERY 6 HOURS PRN
Status: CANCELLED | OUTPATIENT
Start: 2019-01-01

## 2019-01-01 RX ORDER — HYDROMORPHONE HYDROCHLORIDE 4 MG/1
4 TABLET ORAL EVERY 4 HOURS PRN
Qty: 30 TABLET | Refills: 0 | Status: SHIPPED | OUTPATIENT
Start: 2019-01-01 | End: 2019-01-01

## 2019-01-01 RX ORDER — METOCLOPRAMIDE HYDROCHLORIDE 5 MG/ML
10 INJECTION INTRAMUSCULAR; INTRAVENOUS EVERY 6 HOURS PRN
Status: DISCONTINUED | OUTPATIENT
Start: 2019-01-01 | End: 2019-01-01

## 2019-01-01 RX ORDER — NALOXONE HYDROCHLORIDE 0.4 MG/ML
80 INJECTION, SOLUTION INTRAMUSCULAR; INTRAVENOUS; SUBCUTANEOUS AS NEEDED
Status: ACTIVE | OUTPATIENT
Start: 2019-01-01 | End: 2019-01-01

## 2019-01-01 RX ORDER — LORAZEPAM 2 MG/ML
2 INJECTION INTRAMUSCULAR EVERY 4 HOURS PRN
Status: CANCELLED | OUTPATIENT
Start: 2019-01-01

## 2019-01-01 RX ORDER — POLYETHYLENE GLYCOL 3350 17 G/17G
17 POWDER, FOR SOLUTION ORAL DAILY PRN
Status: CANCELLED | OUTPATIENT
Start: 2019-01-01

## 2019-01-01 RX ORDER — SODIUM CHLORIDE 0.9 % (FLUSH) 0.9 %
3 SYRINGE (ML) INJECTION AS NEEDED
Status: DISCONTINUED | OUTPATIENT
Start: 2019-01-01 | End: 2019-01-01

## 2019-01-01 RX ORDER — HYDROCODONE BITARTRATE AND ACETAMINOPHEN 5; 325 MG/1; MG/1
2 TABLET ORAL AS NEEDED
Status: DISCONTINUED | OUTPATIENT
Start: 2019-01-01 | End: 2019-01-01 | Stop reason: HOSPADM

## 2019-01-01 RX ORDER — MORPHINE SULFATE 4 MG/ML
4 INJECTION, SOLUTION INTRAMUSCULAR; INTRAVENOUS EVERY 10 MIN PRN
Status: DISCONTINUED | OUTPATIENT
Start: 2019-01-01 | End: 2019-01-01 | Stop reason: HOSPADM

## 2019-01-01 RX ORDER — SODIUM CHLORIDE 9 MG/ML
INJECTION, SOLUTION INTRAVENOUS
Status: DISCONTINUED
Start: 2019-01-01 | End: 2019-01-01

## 2019-01-01 RX ORDER — LORAZEPAM 1 MG/1
2 TABLET ORAL EVERY 4 HOURS PRN
Status: DISCONTINUED | OUTPATIENT
Start: 2019-01-01 | End: 2019-01-01

## 2019-01-01 RX ORDER — LIDOCAINE HYDROCHLORIDE 10 MG/ML
INJECTION, SOLUTION EPIDURAL; INFILTRATION; INTRACAUDAL; PERINEURAL AS NEEDED
Status: DISCONTINUED | OUTPATIENT
Start: 2019-01-01 | End: 2019-01-01 | Stop reason: SURG

## 2019-01-01 RX ORDER — ATROPINE SULFATE 10 MG/ML
2 SOLUTION/ DROPS OPHTHALMIC EVERY 2 HOUR PRN
Status: CANCELLED | OUTPATIENT
Start: 2019-01-01

## 2019-01-01 RX ORDER — HYDROMORPHONE HYDROCHLORIDE 1 MG/ML
0.4 INJECTION, SOLUTION INTRAMUSCULAR; INTRAVENOUS; SUBCUTANEOUS EVERY 5 MIN PRN
Status: DISCONTINUED | OUTPATIENT
Start: 2019-01-01 | End: 2019-01-01 | Stop reason: HOSPADM

## 2019-01-01 RX ORDER — PROCHLORPERAZINE 25 MG
25 SUPPOSITORY, RECTAL RECTAL EVERY 6 HOURS PRN
Status: DISCONTINUED | OUTPATIENT
Start: 2019-01-01 | End: 2019-01-01

## 2019-01-01 RX ORDER — SODIUM CHLORIDE 0.9 % (FLUSH) 0.9 %
3 SYRINGE (ML) INJECTION AS NEEDED
Status: CANCELLED | OUTPATIENT
Start: 2019-01-01

## 2019-01-01 RX ORDER — LIDOCAINE HYDROCHLORIDE 20 MG/ML
INJECTION, SOLUTION EPIDURAL; INFILTRATION; INTRACAUDAL; PERINEURAL
Status: COMPLETED
Start: 2019-01-01 | End: 2019-01-01

## 2019-01-01 RX ORDER — PROCHLORPERAZINE EDISYLATE 5 MG/ML
5 INJECTION INTRAMUSCULAR; INTRAVENOUS ONCE AS NEEDED
Status: DISCONTINUED | OUTPATIENT
Start: 2019-01-01 | End: 2019-01-01 | Stop reason: HOSPADM

## 2019-01-01 RX ORDER — HALOPERIDOL 1 MG/1
1 TABLET ORAL
Status: CANCELLED | OUTPATIENT
Start: 2019-01-01

## 2019-01-01 RX ORDER — ONDANSETRON 4 MG/1
4 TABLET, FILM COATED ORAL EVERY 8 HOURS PRN
Status: DISCONTINUED | OUTPATIENT
Start: 2019-01-01 | End: 2019-01-01

## 2019-01-01 RX ORDER — LORAZEPAM 2 MG/ML
2 INJECTION INTRAMUSCULAR EVERY 4 HOURS PRN
Status: DISCONTINUED | OUTPATIENT
Start: 2019-01-01 | End: 2019-01-01

## 2019-01-01 RX ORDER — LORAZEPAM 2 MG/ML
1 INJECTION INTRAMUSCULAR ONCE
Status: COMPLETED | OUTPATIENT
Start: 2019-01-01 | End: 2019-01-01

## 2019-01-01 RX ORDER — NALOXONE HYDROCHLORIDE 0.4 MG/ML
80 INJECTION, SOLUTION INTRAMUSCULAR; INTRAVENOUS; SUBCUTANEOUS AS NEEDED
Status: DISCONTINUED | OUTPATIENT
Start: 2019-01-01 | End: 2019-01-01 | Stop reason: HOSPADM

## 2019-01-01 RX ORDER — HYDROMORPHONE HYDROCHLORIDE 1 MG/ML
0.2 INJECTION, SOLUTION INTRAMUSCULAR; INTRAVENOUS; SUBCUTANEOUS EVERY 5 MIN PRN
Status: DISCONTINUED | OUTPATIENT
Start: 2019-01-01 | End: 2019-01-01 | Stop reason: HOSPADM

## 2019-01-01 RX ORDER — HALOPERIDOL 5 MG/ML
0.25 INJECTION INTRAMUSCULAR ONCE AS NEEDED
Status: ACTIVE | OUTPATIENT
Start: 2019-01-01 | End: 2019-01-01

## 2019-01-01 RX ORDER — TRAMADOL HYDROCHLORIDE 50 MG/1
50 TABLET ORAL EVERY 6 HOURS PRN
Status: DISCONTINUED | OUTPATIENT
Start: 2019-01-01 | End: 2019-01-01

## 2019-01-01 RX ORDER — CETIRIZINE HYDROCHLORIDE 10 MG/1
10 TABLET ORAL DAILY
Qty: 30 TABLET | Refills: 0 | Status: SHIPPED | OUTPATIENT
Start: 2019-01-01 | End: 2019-01-01

## 2019-01-01 RX ORDER — HYDROMORPHONE HYDROCHLORIDE 1 MG/ML
0.4 INJECTION, SOLUTION INTRAMUSCULAR; INTRAVENOUS; SUBCUTANEOUS EVERY 2 HOUR PRN
Status: DISCONTINUED | OUTPATIENT
Start: 2019-01-01 | End: 2019-01-01

## 2019-01-01 RX ORDER — SODIUM CHLORIDE 9 MG/ML
INJECTION, SOLUTION INTRAVENOUS
Status: COMPLETED
Start: 2019-01-01 | End: 2019-01-01

## 2019-01-01 RX ORDER — LIDOCAINE HYDROCHLORIDE 10 MG/ML
0.5 INJECTION, SOLUTION INFILTRATION; PERINEURAL ONCE AS NEEDED
Status: ACTIVE | OUTPATIENT
Start: 2019-01-01 | End: 2019-01-01

## 2019-01-01 RX ORDER — HYDROMORPHONE HYDROCHLORIDE 1 MG/ML
1 INJECTION, SOLUTION INTRAMUSCULAR; INTRAVENOUS; SUBCUTANEOUS
Status: DISCONTINUED | OUTPATIENT
Start: 2019-01-01 | End: 2019-01-01

## 2019-01-01 RX ORDER — SODIUM CHLORIDE 0.9 % (FLUSH) 0.9 %
10 SYRINGE (ML) INJECTION AS NEEDED
Status: DISCONTINUED | OUTPATIENT
Start: 2019-01-01 | End: 2019-01-01

## 2019-01-01 RX ORDER — SODIUM CHLORIDE 9 MG/ML
INJECTION, SOLUTION INTRAVENOUS
Status: DISPENSED
Start: 2019-01-01 | End: 2019-01-01

## 2019-01-01 RX ORDER — HYDROCODONE BITARTRATE AND ACETAMINOPHEN 5; 325 MG/1; MG/1
1 TABLET ORAL AS NEEDED
Status: DISCONTINUED | OUTPATIENT
Start: 2019-01-01 | End: 2019-01-01 | Stop reason: HOSPADM

## 2019-01-01 RX ORDER — HYDROMORPHONE HYDROCHLORIDE 1 MG/ML
0.4 INJECTION, SOLUTION INTRAMUSCULAR; INTRAVENOUS; SUBCUTANEOUS ONCE
Status: COMPLETED | OUTPATIENT
Start: 2019-01-01 | End: 2019-01-01

## 2019-01-01 RX ORDER — IBUPROFEN 600 MG/1
600 TABLET ORAL EVERY 6 HOURS PRN
Status: ON HOLD | COMMUNITY
End: 2019-01-01

## 2019-01-01 RX ORDER — HALOPERIDOL 5 MG/ML
2 INJECTION INTRAMUSCULAR
Status: DISCONTINUED | OUTPATIENT
Start: 2019-01-01 | End: 2019-01-01

## 2019-01-01 RX ORDER — PREDNISONE 50 MG/1
50 TABLET ORAL ONCE
Status: COMPLETED | OUTPATIENT
Start: 2019-01-01 | End: 2019-01-01

## 2019-01-01 RX ORDER — HYDROMORPHONE HYDROCHLORIDE 1 MG/ML
0.5 INJECTION, SOLUTION INTRAMUSCULAR; INTRAVENOUS; SUBCUTANEOUS
Status: DISCONTINUED | OUTPATIENT
Start: 2019-01-01 | End: 2019-01-01

## 2019-01-01 RX ORDER — SODIUM CHLORIDE, SODIUM LACTATE, POTASSIUM CHLORIDE, CALCIUM CHLORIDE 600; 310; 30; 20 MG/100ML; MG/100ML; MG/100ML; MG/100ML
INJECTION, SOLUTION INTRAVENOUS CONTINUOUS
Status: DISCONTINUED | OUTPATIENT
Start: 2019-01-01 | End: 2019-01-01 | Stop reason: HOSPADM

## 2019-01-01 RX ORDER — SODIUM CHLORIDE, SODIUM LACTATE, POTASSIUM CHLORIDE, CALCIUM CHLORIDE 600; 310; 30; 20 MG/100ML; MG/100ML; MG/100ML; MG/100ML
INJECTION, SOLUTION INTRAVENOUS CONTINUOUS PRN
Status: DISCONTINUED | OUTPATIENT
Start: 2019-01-01 | End: 2019-01-01 | Stop reason: SURG

## 2019-01-01 RX ORDER — METHYLPREDNISOLONE SODIUM SUCCINATE 125 MG/2ML
60 INJECTION, POWDER, LYOPHILIZED, FOR SOLUTION INTRAMUSCULAR; INTRAVENOUS ONCE
Status: COMPLETED | OUTPATIENT
Start: 2019-01-01 | End: 2019-01-01

## 2019-01-01 RX ORDER — GLYCOPYRROLATE 0.2 MG/ML
0.4 INJECTION, SOLUTION INTRAMUSCULAR; INTRAVENOUS
Status: DISCONTINUED | OUTPATIENT
Start: 2019-01-01 | End: 2019-01-01

## 2019-01-01 RX ORDER — BISACODYL 10 MG
10 SUPPOSITORY, RECTAL RECTAL
Status: CANCELLED | OUTPATIENT
Start: 2019-01-01

## 2019-01-01 RX ORDER — HALOPERIDOL 5 MG/ML
1 INJECTION INTRAMUSCULAR
Status: CANCELLED | OUTPATIENT
Start: 2019-01-01

## 2019-01-01 RX ORDER — HEPARIN SODIUM 1000 [USP'U]/ML
INJECTION, SOLUTION INTRAVENOUS; SUBCUTANEOUS AS NEEDED
Status: DISCONTINUED | OUTPATIENT
Start: 2019-01-01 | End: 2019-01-01 | Stop reason: HOSPADM

## 2019-01-01 RX ORDER — DIPHENHYDRAMINE HCL 25 MG
25 CAPSULE ORAL EVERY 4 HOURS PRN
Status: DISCONTINUED | OUTPATIENT
Start: 2019-01-01 | End: 2019-01-01

## 2019-01-01 RX ORDER — FAMOTIDINE 10 MG/ML
20 INJECTION, SOLUTION INTRAVENOUS ONCE
Status: COMPLETED | OUTPATIENT
Start: 2019-01-01 | End: 2019-01-01

## 2019-01-01 RX ORDER — DIPHENHYDRAMINE HCL 25 MG
50 CAPSULE ORAL ONCE
Status: COMPLETED | OUTPATIENT
Start: 2019-01-01 | End: 2019-01-01

## 2019-01-01 RX ORDER — CIPROFLOXACIN 500 MG/1
500 TABLET, FILM COATED ORAL EVERY 12 HOURS SCHEDULED
Status: DISCONTINUED | OUTPATIENT
Start: 2019-01-01 | End: 2019-01-01

## 2019-01-01 RX ORDER — ENOXAPARIN SODIUM 100 MG/ML
40 INJECTION SUBCUTANEOUS DAILY
Status: DISCONTINUED | OUTPATIENT
Start: 2019-01-01 | End: 2019-01-01

## 2019-01-01 RX ORDER — NALOXONE HYDROCHLORIDE 4 MG/.1ML
4 SPRAY, METERED NASAL AS NEEDED
Qty: 1 EACH | Refills: 0 | Status: ON HOLD | OUTPATIENT
Start: 2019-01-01 | End: 2019-01-01

## 2019-01-01 RX ORDER — METHYLPREDNISOLONE SODIUM SUCCINATE 125 MG/2ML
125 INJECTION, POWDER, LYOPHILIZED, FOR SOLUTION INTRAMUSCULAR; INTRAVENOUS ONCE
Status: COMPLETED | OUTPATIENT
Start: 2019-01-01 | End: 2019-01-01

## 2019-01-01 RX ORDER — METHYLPREDNISOLONE SODIUM SUCCINATE 125 MG/2ML
INJECTION, POWDER, LYOPHILIZED, FOR SOLUTION INTRAMUSCULAR; INTRAVENOUS
Status: COMPLETED
Start: 2019-01-01 | End: 2019-01-01

## 2019-01-01 RX ORDER — PHENYLEPHRINE HCL 10 MG/ML
VIAL (ML) INJECTION AS NEEDED
Status: DISCONTINUED | OUTPATIENT
Start: 2019-01-01 | End: 2019-01-01 | Stop reason: SURG

## 2019-01-01 RX ORDER — FUROSEMIDE 10 MG/ML
40 INJECTION INTRAMUSCULAR; INTRAVENOUS EVERY 8 HOURS PRN
Status: CANCELLED | OUTPATIENT
Start: 2019-01-01

## 2019-01-01 RX ORDER — ACETAMINOPHEN 160 MG/5ML
650 SOLUTION ORAL EVERY 6 HOURS PRN
Status: DISCONTINUED | OUTPATIENT
Start: 2019-01-01 | End: 2019-01-01

## 2019-01-01 RX ORDER — KETAMINE HYDROCHLORIDE 50 MG/ML
10 INJECTION, SOLUTION, CONCENTRATE INTRAMUSCULAR; INTRAVENOUS ONCE
Status: COMPLETED | OUTPATIENT
Start: 2019-01-01 | End: 2019-01-01

## 2019-01-01 RX ORDER — HYDROMORPHONE HYDROCHLORIDE 1 MG/ML
0.2 INJECTION, SOLUTION INTRAMUSCULAR; INTRAVENOUS; SUBCUTANEOUS EVERY 5 MIN PRN
Status: DISCONTINUED | OUTPATIENT
Start: 2019-01-01 | End: 2019-01-01

## 2019-01-01 RX ORDER — LORAZEPAM 0.5 MG/1
0.5 TABLET ORAL EVERY 4 HOURS PRN
Status: DISCONTINUED | OUTPATIENT
Start: 2019-01-01 | End: 2019-01-01

## 2019-01-01 RX ORDER — ROCURONIUM BROMIDE 10 MG/ML
INJECTION, SOLUTION INTRAVENOUS AS NEEDED
Status: DISCONTINUED | OUTPATIENT
Start: 2019-01-01 | End: 2019-01-01 | Stop reason: SURG

## 2019-01-01 RX ORDER — HYDROMORPHONE HYDROCHLORIDE 2 MG/1
4 TABLET ORAL EVERY 4 HOURS PRN
Status: DISCONTINUED | OUTPATIENT
Start: 2019-01-01 | End: 2019-01-01

## 2019-01-01 RX ORDER — HYDROMORPHONE HYDROCHLORIDE 1 MG/ML
0.4 INJECTION, SOLUTION INTRAMUSCULAR; INTRAVENOUS; SUBCUTANEOUS EVERY 5 MIN PRN
Status: DISCONTINUED | OUTPATIENT
Start: 2019-01-01 | End: 2019-01-01

## 2019-01-01 RX ORDER — POTASSIUM CHLORIDE 20 MEQ/1
40 TABLET, EXTENDED RELEASE ORAL ONCE
Status: DISCONTINUED | OUTPATIENT
Start: 2019-01-01 | End: 2019-01-01

## 2019-01-01 RX ORDER — IBUPROFEN 600 MG/1
600 TABLET ORAL ONCE
Status: DISCONTINUED | OUTPATIENT
Start: 2019-01-01 | End: 2019-01-01

## 2019-01-01 RX ORDER — DEXAMETHASONE SODIUM PHOSPHATE 4 MG/ML
VIAL (ML) INJECTION AS NEEDED
Status: DISCONTINUED | OUTPATIENT
Start: 2019-01-01 | End: 2019-01-01 | Stop reason: SURG

## 2019-01-01 RX ORDER — MORPHINE SULFATE 10 MG/ML
6 INJECTION, SOLUTION INTRAMUSCULAR; INTRAVENOUS EVERY 10 MIN PRN
Status: DISCONTINUED | OUTPATIENT
Start: 2019-01-01 | End: 2019-01-01 | Stop reason: HOSPADM

## 2019-01-01 RX ORDER — HALOPERIDOL 5 MG/ML
0.25 INJECTION INTRAMUSCULAR ONCE AS NEEDED
Status: DISCONTINUED | OUTPATIENT
Start: 2019-01-01 | End: 2019-01-01 | Stop reason: HOSPADM

## 2019-01-01 RX ORDER — CIPROFLOXACIN 500 MG/1
500 TABLET, FILM COATED ORAL 2 TIMES DAILY
Qty: 18 TABLET | Refills: 0 | Status: SHIPPED | OUTPATIENT
Start: 2019-01-01 | End: 2019-01-01

## 2019-01-01 RX ORDER — FUROSEMIDE 40 MG/1
40 TABLET ORAL EVERY 8 HOURS PRN
Status: CANCELLED | OUTPATIENT
Start: 2019-01-01

## 2019-01-01 RX ORDER — METHYLPREDNISOLONE SODIUM SUCCINATE 40 MG/ML
40 INJECTION, POWDER, LYOPHILIZED, FOR SOLUTION INTRAMUSCULAR; INTRAVENOUS EVERY 6 HOURS
Status: DISCONTINUED | OUTPATIENT
Start: 2019-01-01 | End: 2019-01-01

## 2019-01-01 RX ORDER — PROCHLORPERAZINE EDISYLATE 5 MG/ML
5 INJECTION INTRAMUSCULAR; INTRAVENOUS ONCE AS NEEDED
Status: ACTIVE | OUTPATIENT
Start: 2019-01-01 | End: 2019-01-01

## 2019-01-01 RX ORDER — AMOXICILLIN AND CLAVULANATE POTASSIUM 875; 125 MG/1; MG/1
1 TABLET, FILM COATED ORAL 2 TIMES DAILY
Qty: 10 TABLET | Refills: 0 | Status: SHIPPED | OUTPATIENT
Start: 2019-01-01 | End: 2019-01-01

## 2019-01-01 RX ORDER — HYDROMORPHONE HCL IN 0.9% NACL 0.2 MG/ML
0.2 PLASTIC BAG, INJECTION (ML) INTRAVENOUS CONTINUOUS
Status: DISCONTINUED | OUTPATIENT
Start: 2019-01-01 | End: 2019-01-01

## 2019-01-01 RX ORDER — MORPHINE SULFATE 4 MG/ML
2 INJECTION, SOLUTION INTRAMUSCULAR; INTRAVENOUS EVERY 10 MIN PRN
Status: DISCONTINUED | OUTPATIENT
Start: 2019-01-01 | End: 2019-01-01

## 2019-01-01 RX ORDER — LEVOFLOXACIN 500 MG/1
500 TABLET, FILM COATED ORAL DAILY
Status: DISCONTINUED | OUTPATIENT
Start: 2019-01-01 | End: 2019-01-01

## 2019-01-01 RX ORDER — SODIUM CHLORIDE, SODIUM LACTATE, POTASSIUM CHLORIDE, CALCIUM CHLORIDE 600; 310; 30; 20 MG/100ML; MG/100ML; MG/100ML; MG/100ML
INJECTION, SOLUTION INTRAVENOUS CONTINUOUS
Status: CANCELLED | OUTPATIENT
Start: 2019-01-01

## 2019-01-01 RX ORDER — HYDROMORPHONE HYDROCHLORIDE 1 MG/ML
INJECTION, SOLUTION INTRAMUSCULAR; INTRAVENOUS; SUBCUTANEOUS
Status: DISPENSED
Start: 2019-01-01 | End: 2019-01-01

## 2019-01-01 RX ORDER — METOCLOPRAMIDE 10 MG/1
10 TABLET ORAL EVERY 6 HOURS PRN
Status: CANCELLED | OUTPATIENT
Start: 2019-01-01

## 2019-01-01 RX ORDER — ACETAMINOPHEN 160 MG/5ML
650 SOLUTION ORAL EVERY 6 HOURS SCHEDULED
Status: CANCELLED | OUTPATIENT
Start: 2019-01-01

## 2019-01-01 RX ORDER — ONDANSETRON 2 MG/ML
INJECTION INTRAMUSCULAR; INTRAVENOUS AS NEEDED
Status: DISCONTINUED | OUTPATIENT
Start: 2019-01-01 | End: 2019-01-01 | Stop reason: SURG

## 2019-01-01 RX ORDER — ONDANSETRON 2 MG/ML
4 INJECTION INTRAMUSCULAR; INTRAVENOUS ONCE
Status: COMPLETED | OUTPATIENT
Start: 2019-01-01 | End: 2019-01-01

## 2019-01-01 RX ORDER — ONDANSETRON 4 MG/1
4 TABLET, ORALLY DISINTEGRATING ORAL EVERY 6 HOURS PRN
Status: CANCELLED | OUTPATIENT
Start: 2019-01-01

## 2019-01-01 RX ORDER — FAMOTIDINE 10 MG
10 TABLET ORAL EVERY 6 HOURS PRN
Status: DISCONTINUED | OUTPATIENT
Start: 2019-01-01 | End: 2019-01-01

## 2019-01-01 RX ORDER — ONDANSETRON 2 MG/ML
4 INJECTION INTRAMUSCULAR; INTRAVENOUS EVERY 6 HOURS PRN
Status: DISCONTINUED | OUTPATIENT
Start: 2019-01-01 | End: 2019-01-01

## 2019-01-01 RX ORDER — LORAZEPAM 1 MG/1
1 TABLET ORAL EVERY 4 HOURS PRN
Status: DISCONTINUED | OUTPATIENT
Start: 2019-01-01 | End: 2019-01-01

## 2019-01-01 RX ORDER — HYDROMORPHONE HYDROCHLORIDE 1 MG/ML
0.6 INJECTION, SOLUTION INTRAMUSCULAR; INTRAVENOUS; SUBCUTANEOUS EVERY 5 MIN PRN
Status: DISCONTINUED | OUTPATIENT
Start: 2019-01-01 | End: 2019-01-01

## 2019-01-01 RX ORDER — SCOLOPAMINE TRANSDERMAL SYSTEM 1 MG/1
1 PATCH, EXTENDED RELEASE TRANSDERMAL
Status: CANCELLED | OUTPATIENT
Start: 2019-01-01

## 2019-01-01 RX ORDER — METOCLOPRAMIDE HYDROCHLORIDE 5 MG/ML
INJECTION INTRAMUSCULAR; INTRAVENOUS
Status: DISCONTINUED
Start: 2019-01-01 | End: 2019-01-01

## 2019-01-01 RX ORDER — CEFAZOLIN SODIUM/WATER 2 G/20 ML
2 SYRINGE (ML) INTRAVENOUS ONCE
Status: DISCONTINUED | OUTPATIENT
Start: 2019-01-01 | End: 2019-01-01

## 2019-01-01 RX ORDER — HYDROMORPHONE HYDROCHLORIDE 1 MG/ML
1 INJECTION, SOLUTION INTRAMUSCULAR; INTRAVENOUS; SUBCUTANEOUS ONCE
Status: COMPLETED | OUTPATIENT
Start: 2019-01-01 | End: 2019-01-01

## 2019-01-01 RX ORDER — MIDAZOLAM HYDROCHLORIDE 1 MG/ML
INJECTION INTRAMUSCULAR; INTRAVENOUS
Status: COMPLETED
Start: 2019-01-01 | End: 2019-01-01

## 2019-01-01 RX ORDER — HYDROMORPHONE HYDROCHLORIDE 1 MG/ML
INJECTION, SOLUTION INTRAMUSCULAR; INTRAVENOUS; SUBCUTANEOUS
Status: COMPLETED
Start: 2019-01-01 | End: 2019-01-01

## 2019-01-01 RX ORDER — FINASTERIDE 5 MG/1
1.25 TABLET, FILM COATED ORAL DAILY
Status: DISCONTINUED | OUTPATIENT
Start: 2019-01-01 | End: 2019-01-01

## 2019-01-01 RX ORDER — FAMOTIDINE 10 MG
10 TABLET ORAL EVERY 6 HOURS PRN
Status: ON HOLD | COMMUNITY
End: 2019-01-01

## 2019-01-01 RX ORDER — ONDANSETRON 2 MG/ML
4 INJECTION INTRAMUSCULAR; INTRAVENOUS ONCE AS NEEDED
Status: DISCONTINUED | OUTPATIENT
Start: 2019-01-01 | End: 2019-01-01 | Stop reason: HOSPADM

## 2019-01-01 RX ORDER — MONTELUKAST SODIUM 10 MG/1
10 TABLET ORAL NIGHTLY
Qty: 30 TABLET | Refills: 0 | Status: SHIPPED | OUTPATIENT
Start: 2019-01-01 | End: 2019-01-01 | Stop reason: ALTCHOICE

## 2019-01-01 RX ORDER — HYDROMORPHONE HYDROCHLORIDE 1 MG/ML
INJECTION, SOLUTION INTRAMUSCULAR; INTRAVENOUS; SUBCUTANEOUS
Status: DISCONTINUED | OUTPATIENT
Start: 2019-01-01 | End: 2019-01-01

## 2019-01-01 RX ORDER — HALOPERIDOL 5 MG/ML
2 INJECTION INTRAMUSCULAR
Status: CANCELLED | OUTPATIENT
Start: 2019-01-01

## 2019-01-01 RX ORDER — DIPHENHYDRAMINE HCL 25 MG
50 TABLET ORAL ONCE
Qty: 1 TABLET | Refills: 1 | Status: SHIPPED | OUTPATIENT
Start: 2019-01-01 | End: 2019-01-01

## 2019-01-01 RX ORDER — FINASTERIDE 5 MG/1
1 TABLET, FILM COATED ORAL DAILY
Status: DISCONTINUED | OUTPATIENT
Start: 2019-01-01 | End: 2019-01-01

## 2019-01-01 RX ORDER — LEVOFLOXACIN 500 MG/1
500 TABLET, FILM COATED ORAL DAILY
Qty: 3 TABLET | Refills: 0 | Status: ON HOLD | OUTPATIENT
Start: 2019-01-01 | End: 2019-01-01

## 2019-01-01 RX ORDER — PROCHLORPERAZINE MALEATE 10 MG
10 TABLET ORAL EVERY 4 HOURS PRN
Refills: 0 | Status: SHIPPED | COMMUNITY
Start: 2019-01-01

## 2019-01-01 RX ORDER — HYDROMORPHONE HYDROCHLORIDE 2 MG/1
2 TABLET ORAL EVERY 2 HOUR PRN
Status: DISCONTINUED | OUTPATIENT
Start: 2019-01-01 | End: 2019-01-01

## 2019-01-01 RX ORDER — ACETAMINOPHEN 325 MG/1
650 TABLET ORAL EVERY 6 HOURS PRN
Status: DISCONTINUED | OUTPATIENT
Start: 2019-01-01 | End: 2019-01-01

## 2019-01-01 RX ORDER — POLYETHYLENE GLYCOL 3350 17 G/17G
17 POWDER, FOR SOLUTION ORAL DAILY PRN
Status: DISCONTINUED | OUTPATIENT
Start: 2019-01-01 | End: 2019-01-01

## 2019-01-01 RX ORDER — SCOLOPAMINE TRANSDERMAL SYSTEM 1 MG/1
1 PATCH, EXTENDED RELEASE TRANSDERMAL
Status: DISCONTINUED | OUTPATIENT
Start: 2019-01-01 | End: 2019-01-01

## 2019-01-01 RX ORDER — ONDANSETRON 4 MG/1
4 TABLET, FILM COATED ORAL EVERY 8 HOURS PRN
Status: CANCELLED | OUTPATIENT
Start: 2019-01-01

## 2019-01-01 RX ORDER — HALOPERIDOL 5 MG/ML
1 INJECTION INTRAMUSCULAR
Status: DISCONTINUED | OUTPATIENT
Start: 2019-01-01 | End: 2019-01-01

## 2019-01-01 RX ORDER — ONDANSETRON 2 MG/ML
INJECTION INTRAMUSCULAR; INTRAVENOUS
Status: COMPLETED
Start: 2019-01-01 | End: 2019-01-01

## 2019-01-01 RX ORDER — ACETAMINOPHEN 650 MG/1
650 SUPPOSITORY RECTAL EVERY 6 HOURS SCHEDULED
Status: DISCONTINUED | OUTPATIENT
Start: 2019-01-01 | End: 2019-01-01

## 2019-01-01 RX ORDER — NEOSTIGMINE METHYLSULFATE 0.5 MG/ML
INJECTION INTRAVENOUS AS NEEDED
Status: DISCONTINUED | OUTPATIENT
Start: 2019-01-01 | End: 2019-01-01 | Stop reason: SURG

## 2019-01-01 RX ORDER — POLYETHYLENE GLYCOL 3350 17 G/17G
17 POWDER, FOR SOLUTION ORAL DAILY PRN
Qty: 1 EACH | Refills: 0 | Status: SHIPPED | COMMUNITY
Start: 2019-01-01

## 2019-01-01 RX ORDER — POTASSIUM CHLORIDE 20 MEQ/1
40 TABLET, EXTENDED RELEASE ORAL EVERY 4 HOURS
Status: COMPLETED | OUTPATIENT
Start: 2019-01-01 | End: 2019-01-01

## 2019-01-01 RX ORDER — POTASSIUM CHLORIDE 14.9 MG/ML
20 INJECTION INTRAVENOUS ONCE
Status: COMPLETED | OUTPATIENT
Start: 2019-01-01 | End: 2019-01-01

## 2019-01-01 RX ORDER — LORAZEPAM 2 MG/ML
1 INJECTION INTRAMUSCULAR EVERY 4 HOURS PRN
Status: CANCELLED | OUTPATIENT
Start: 2019-01-01

## 2019-01-01 RX ORDER — LORAZEPAM 0.5 MG/1
0.5 TABLET ORAL EVERY 4 HOURS PRN
Status: CANCELLED | OUTPATIENT
Start: 2019-01-01

## 2019-01-01 RX ORDER — PROCHLORPERAZINE MALEATE 10 MG
10 TABLET ORAL EVERY 4 HOURS PRN
Status: CANCELLED | OUTPATIENT
Start: 2019-01-01

## 2019-01-01 RX ORDER — DIPHENHYDRAMINE HYDROCHLORIDE 50 MG/ML
50 INJECTION INTRAMUSCULAR; INTRAVENOUS ONCE
Status: COMPLETED | OUTPATIENT
Start: 2019-01-01 | End: 2019-01-01

## 2019-01-01 RX ORDER — MORPHINE SULFATE 4 MG/ML
2 INJECTION, SOLUTION INTRAMUSCULAR; INTRAVENOUS EVERY 10 MIN PRN
Status: DISCONTINUED | OUTPATIENT
Start: 2019-01-01 | End: 2019-01-01 | Stop reason: HOSPADM

## 2019-01-01 RX ORDER — FUROSEMIDE 10 MG/ML
20 INJECTION INTRAMUSCULAR; INTRAVENOUS ONCE
Status: DISCONTINUED | OUTPATIENT
Start: 2019-01-01 | End: 2019-01-01

## 2019-01-01 RX ORDER — GLYCOPYRROLATE 0.2 MG/ML
INJECTION INTRAMUSCULAR; INTRAVENOUS AS NEEDED
Status: DISCONTINUED | OUTPATIENT
Start: 2019-01-01 | End: 2019-01-01 | Stop reason: SURG

## 2019-01-01 RX ORDER — TRAMADOL HYDROCHLORIDE 50 MG/1
50 TABLET ORAL EVERY 6 HOURS PRN
Qty: 40 TABLET | Refills: 0 | Status: ON HOLD | OUTPATIENT
Start: 2019-01-01 | End: 2019-01-01

## 2019-01-01 RX ORDER — HYOSCYAMINE SULFATE 0.125 MG
0.12 TABLET,DISINTEGRATING ORAL 4 TIMES DAILY PRN
Status: DISCONTINUED | OUTPATIENT
Start: 2019-01-01 | End: 2019-01-01

## 2019-01-01 RX ORDER — METOCLOPRAMIDE HYDROCHLORIDE 5 MG/ML
10 INJECTION INTRAMUSCULAR; INTRAVENOUS EVERY 8 HOURS PRN
Status: DISCONTINUED | OUTPATIENT
Start: 2019-01-01 | End: 2019-01-01

## 2019-01-01 RX ORDER — FUROSEMIDE 40 MG/1
40 TABLET ORAL EVERY 8 HOURS PRN
Status: DISCONTINUED | OUTPATIENT
Start: 2019-01-01 | End: 2019-01-01

## 2019-01-01 RX ORDER — PROCHLORPERAZINE MALEATE 10 MG
10 TABLET ORAL EVERY 4 HOURS PRN
Status: DISCONTINUED | OUTPATIENT
Start: 2019-01-01 | End: 2019-01-01

## 2019-01-01 RX ORDER — METHYLPREDNISOLONE SODIUM SUCCINATE 40 MG/ML
40 INJECTION, POWDER, LYOPHILIZED, FOR SOLUTION INTRAMUSCULAR; INTRAVENOUS EVERY 6 HOURS
Status: DISPENSED | OUTPATIENT
Start: 2019-01-01 | End: 2019-01-01

## 2019-01-01 RX ORDER — DIPHENHYDRAMINE HCL 25 MG
25 CAPSULE ORAL EVERY 4 HOURS PRN
Status: CANCELLED | OUTPATIENT
Start: 2019-01-01

## 2019-01-01 RX ORDER — DIPHENHYDRAMINE HYDROCHLORIDE 50 MG/ML
25 INJECTION INTRAMUSCULAR; INTRAVENOUS EVERY 8 HOURS
Status: DISCONTINUED | OUTPATIENT
Start: 2019-01-01 | End: 2019-01-01

## 2019-01-01 RX ORDER — MONTELUKAST SODIUM 10 MG/1
10 TABLET ORAL ONCE
Status: COMPLETED | OUTPATIENT
Start: 2019-01-01 | End: 2019-01-01

## 2019-01-01 RX ORDER — LIDOCAINE HYDROCHLORIDE 10 MG/ML
INJECTION, SOLUTION EPIDURAL; INFILTRATION; INTRACAUDAL; PERINEURAL AS NEEDED
Status: DISCONTINUED | OUTPATIENT
Start: 2019-01-01 | End: 2019-01-01 | Stop reason: HOSPADM

## 2019-01-01 RX ORDER — HYDROMORPHONE HYDROCHLORIDE 4 MG/1
4 TABLET ORAL EVERY 4 HOURS PRN
Status: DISCONTINUED | OUTPATIENT
Start: 2019-01-01 | End: 2019-01-01

## 2019-01-01 RX ORDER — TRAMADOL HYDROCHLORIDE 50 MG/1
50 TABLET ORAL EVERY 6 HOURS PRN
Qty: 20 TABLET | Refills: 0 | Status: SHIPPED | OUTPATIENT
Start: 2019-01-01 | End: 2019-01-01

## 2019-01-01 RX ORDER — ONDANSETRON 2 MG/ML
4 INJECTION INTRAMUSCULAR; INTRAVENOUS ONCE AS NEEDED
Status: ACTIVE | OUTPATIENT
Start: 2019-01-01 | End: 2019-01-01

## 2019-01-01 RX ORDER — ATROPINE SULFATE 10 MG/ML
2 SOLUTION/ DROPS OPHTHALMIC EVERY 2 HOUR PRN
Status: DISCONTINUED | OUTPATIENT
Start: 2019-01-01 | End: 2019-01-01

## 2019-01-01 RX ORDER — GLYCOPYRROLATE 0.2 MG/ML
0.4 INJECTION, SOLUTION INTRAMUSCULAR; INTRAVENOUS
Status: CANCELLED | OUTPATIENT
Start: 2019-01-01

## 2019-01-01 RX ORDER — PROCHLORPERAZINE MALEATE 10 MG
10 TABLET ORAL EVERY 6 HOURS PRN
Status: ON HOLD | COMMUNITY
End: 2019-01-01

## 2019-01-01 RX ORDER — DIPHENHYDRAMINE HYDROCHLORIDE 50 MG/ML
25 INJECTION INTRAMUSCULAR; INTRAVENOUS ONCE
Status: COMPLETED | OUTPATIENT
Start: 2019-01-01 | End: 2019-01-01

## 2019-01-01 RX ORDER — MORPHINE SULFATE 4 MG/ML
5 INJECTION, SOLUTION INTRAMUSCULAR; INTRAVENOUS ONCE
Status: COMPLETED | OUTPATIENT
Start: 2019-01-01 | End: 2019-01-01

## 2019-01-01 RX ORDER — LORAZEPAM 1 MG/1
2 TABLET ORAL EVERY 4 HOURS PRN
Status: CANCELLED | OUTPATIENT
Start: 2019-01-01

## 2019-01-01 RX ORDER — ONDANSETRON 2 MG/ML
4 INJECTION INTRAMUSCULAR; INTRAVENOUS EVERY 4 HOURS PRN
Status: DISCONTINUED | OUTPATIENT
Start: 2019-01-01 | End: 2019-01-01

## 2019-01-01 RX ORDER — ONDANSETRON 4 MG/1
4 TABLET, ORALLY DISINTEGRATING ORAL EVERY 6 HOURS PRN
Refills: 0 | Status: SHIPPED | COMMUNITY
Start: 2019-01-01

## 2019-01-01 RX ORDER — HYDROMORPHONE HYDROCHLORIDE 1 MG/ML
0.2 INJECTION, SOLUTION INTRAMUSCULAR; INTRAVENOUS; SUBCUTANEOUS
Status: DISCONTINUED | OUTPATIENT
Start: 2019-01-01 | End: 2019-01-01

## 2019-01-01 RX ORDER — MORPHINE SULFATE 2 MG/ML
2 INJECTION, SOLUTION INTRAMUSCULAR; INTRAVENOUS EVERY 10 MIN PRN
Status: DISCONTINUED | OUTPATIENT
Start: 2019-01-01 | End: 2019-01-01 | Stop reason: HOSPADM

## 2019-01-01 RX ORDER — METHYLPREDNISOLONE SODIUM SUCCINATE 125 MG/2ML
80 INJECTION, POWDER, LYOPHILIZED, FOR SOLUTION INTRAMUSCULAR; INTRAVENOUS ONCE
Status: DISCONTINUED | OUTPATIENT
Start: 2019-01-01 | End: 2019-01-01

## 2019-01-01 RX ORDER — MIDAZOLAM HYDROCHLORIDE 1 MG/ML
INJECTION INTRAMUSCULAR; INTRAVENOUS
Status: DISPENSED
Start: 2019-01-01 | End: 2019-01-01

## 2019-01-01 RX ORDER — ONDANSETRON 4 MG/1
4 TABLET, ORALLY DISINTEGRATING ORAL EVERY 6 HOURS PRN
Status: DISCONTINUED | OUTPATIENT
Start: 2019-01-01 | End: 2019-01-01

## 2019-01-01 RX ORDER — POTASSIUM CHLORIDE 29.8 MG/ML
40 INJECTION INTRAVENOUS ONCE
Status: COMPLETED | OUTPATIENT
Start: 2019-01-01 | End: 2019-01-01

## 2019-01-01 RX ORDER — MIDAZOLAM HYDROCHLORIDE 1 MG/ML
INJECTION INTRAMUSCULAR; INTRAVENOUS AS NEEDED
Status: DISCONTINUED | OUTPATIENT
Start: 2019-01-01 | End: 2019-01-01 | Stop reason: SURG

## 2019-01-01 RX ORDER — HYDROMORPHONE HYDROCHLORIDE 1 MG/ML
0.8 INJECTION, SOLUTION INTRAMUSCULAR; INTRAVENOUS; SUBCUTANEOUS EVERY 2 HOUR PRN
Status: DISCONTINUED | OUTPATIENT
Start: 2019-01-01 | End: 2019-01-01

## 2019-01-01 RX ORDER — HYDROMORPHONE HYDROCHLORIDE 1 MG/ML
1 INJECTION, SOLUTION INTRAMUSCULAR; INTRAVENOUS; SUBCUTANEOUS ONCE
Status: DISCONTINUED | OUTPATIENT
Start: 2019-01-01 | End: 2019-01-01

## 2019-01-01 RX ORDER — FLUMAZENIL 0.1 MG/ML
0.2 INJECTION, SOLUTION INTRAVENOUS AS NEEDED
Status: DISCONTINUED | OUTPATIENT
Start: 2019-01-01 | End: 2019-01-01

## 2019-01-01 RX ORDER — HYDROMORPHONE HYDROCHLORIDE 2 MG/1
2 TABLET ORAL EVERY 2 HOUR PRN
Status: CANCELLED | OUTPATIENT
Start: 2019-01-01

## 2019-01-01 RX ORDER — PREDNISONE 20 MG/1
40 TABLET ORAL DAILY
Qty: 8 TABLET | Refills: 0 | Status: SHIPPED | OUTPATIENT
Start: 2019-01-01 | End: 2019-01-01

## 2019-01-01 RX ORDER — NALOXONE HYDROCHLORIDE 0.4 MG/ML
0.4 INJECTION, SOLUTION INTRAMUSCULAR; INTRAVENOUS; SUBCUTANEOUS AS NEEDED
Status: DISCONTINUED | OUTPATIENT
Start: 2019-01-01 | End: 2019-01-01

## 2019-01-01 RX ORDER — PROCHLORPERAZINE 25 MG
25 SUPPOSITORY, RECTAL RECTAL EVERY 6 HOURS PRN
Status: CANCELLED | OUTPATIENT
Start: 2019-01-01

## 2019-01-01 RX ORDER — HALOPERIDOL 1 MG/1
2 TABLET ORAL
Status: CANCELLED | OUTPATIENT
Start: 2019-01-01

## 2019-01-01 RX ORDER — METOCLOPRAMIDE 10 MG/1
10 TABLET ORAL EVERY 6 HOURS PRN
Status: DISCONTINUED | OUTPATIENT
Start: 2019-01-01 | End: 2019-01-01

## 2019-01-01 RX ORDER — ACETAMINOPHEN 650 MG/1
650 SUPPOSITORY RECTAL EVERY 6 HOURS SCHEDULED
Status: CANCELLED | OUTPATIENT
Start: 2019-01-01

## 2019-01-01 RX ORDER — METOCLOPRAMIDE HYDROCHLORIDE 5 MG/ML
10 INJECTION INTRAMUSCULAR; INTRAVENOUS ONCE
Status: COMPLETED | OUTPATIENT
Start: 2019-01-01 | End: 2019-01-01

## 2019-01-01 RX ORDER — CEFAZOLIN SODIUM/WATER 2 G/20 ML
2 SYRINGE (ML) INTRAVENOUS ONCE
Status: COMPLETED | OUTPATIENT
Start: 2019-01-01 | End: 2019-01-01

## 2019-01-01 RX ORDER — NALOXONE HYDROCHLORIDE 0.4 MG/ML
80 INJECTION, SOLUTION INTRAMUSCULAR; INTRAVENOUS; SUBCUTANEOUS AS NEEDED
Status: DISCONTINUED | OUTPATIENT
Start: 2019-01-01 | End: 2019-01-01

## 2019-01-01 RX ORDER — HYDROCODONE BITARTRATE AND ACETAMINOPHEN 5; 325 MG/1; MG/1
1 TABLET ORAL AS NEEDED
Status: DISCONTINUED | OUTPATIENT
Start: 2019-01-01 | End: 2019-01-01

## 2019-01-01 RX ORDER — NALOXONE HYDROCHLORIDE 0.4 MG/ML
80 INJECTION, SOLUTION INTRAMUSCULAR; INTRAVENOUS; SUBCUTANEOUS AS NEEDED
Status: CANCELLED | OUTPATIENT
Start: 2019-01-01 | End: 2019-01-01

## 2019-01-01 RX ORDER — SIMETHICONE 80 MG
80 TABLET,CHEWABLE ORAL EVERY 4 HOURS PRN
Status: DISCONTINUED | OUTPATIENT
Start: 2019-01-01 | End: 2019-01-01

## 2019-01-01 RX ORDER — MORPHINE SULFATE 10 MG/ML
6 INJECTION, SOLUTION INTRAMUSCULAR; INTRAVENOUS EVERY 10 MIN PRN
Status: DISCONTINUED | OUTPATIENT
Start: 2019-01-01 | End: 2019-01-01

## 2019-01-01 RX ORDER — POTASSIUM CHLORIDE 1.5 G/1.77G
40 POWDER, FOR SOLUTION ORAL EVERY 4 HOURS
Status: DISPENSED | OUTPATIENT
Start: 2019-01-01 | End: 2019-01-01

## 2019-01-01 RX ORDER — BUPIVACAINE HYDROCHLORIDE 2.5 MG/ML
INJECTION, SOLUTION EPIDURAL; INFILTRATION; INTRACAUDAL AS NEEDED
Status: DISCONTINUED | OUTPATIENT
Start: 2019-01-01 | End: 2019-01-01 | Stop reason: HOSPADM

## 2019-01-01 RX ORDER — MONTELUKAST SODIUM 10 MG/1
10 TABLET ORAL NIGHTLY
Qty: 1 TABLET | Refills: 1 | Status: ON HOLD | OUTPATIENT
Start: 2019-01-01 | End: 2019-01-01

## 2019-01-01 RX ORDER — HYDROMORPHONE HYDROCHLORIDE 1 MG/ML
1 INJECTION, SOLUTION INTRAMUSCULAR; INTRAVENOUS; SUBCUTANEOUS EVERY 2 HOUR PRN
Status: DISCONTINUED | OUTPATIENT
Start: 2019-01-01 | End: 2019-01-01

## 2019-01-01 RX ORDER — FUROSEMIDE 10 MG/ML
40 INJECTION INTRAMUSCULAR; INTRAVENOUS ONCE
Status: COMPLETED | OUTPATIENT
Start: 2019-01-01 | End: 2019-01-01

## 2019-01-01 RX ORDER — HYDROCODONE BITARTRATE AND ACETAMINOPHEN 5; 325 MG/1; MG/1
2 TABLET ORAL AS NEEDED
Status: DISCONTINUED | OUTPATIENT
Start: 2019-01-01 | End: 2019-01-01

## 2019-01-01 RX ORDER — DOCUSATE SODIUM 100 MG/1
100 CAPSULE, LIQUID FILLED ORAL 2 TIMES DAILY
Status: DISCONTINUED | OUTPATIENT
Start: 2019-01-01 | End: 2019-01-01

## 2019-01-01 RX ORDER — ACETAMINOPHEN 10 MG/ML
1000 INJECTION, SOLUTION INTRAVENOUS EVERY 6 HOURS
Status: DISCONTINUED | OUTPATIENT
Start: 2019-01-01 | End: 2019-01-01

## 2019-01-01 RX ORDER — METOCLOPRAMIDE HYDROCHLORIDE 5 MG/ML
5 INJECTION INTRAMUSCULAR; INTRAVENOUS ONCE
Status: DISCONTINUED | OUTPATIENT
Start: 2019-01-01 | End: 2019-01-01

## 2019-01-01 RX ORDER — PREDNISONE 50 MG/1
50 TABLET ORAL EVERY 6 HOURS
Qty: 6 TABLET | Refills: 1 | Status: ON HOLD | OUTPATIENT
Start: 2019-01-01 | End: 2019-01-01

## 2019-01-01 RX ORDER — MIDAZOLAM HYDROCHLORIDE 1 MG/ML
1 INJECTION INTRAMUSCULAR; INTRAVENOUS EVERY 5 MIN PRN
Status: DISCONTINUED | OUTPATIENT
Start: 2019-01-01 | End: 2019-01-01

## 2019-01-01 RX ORDER — HYOSCYAMINE SULFATE 0.125 MG
0.12 TABLET,DISINTEGRATING ORAL 4 TIMES DAILY PRN
Status: CANCELLED | OUTPATIENT
Start: 2019-01-01

## 2019-01-01 RX ORDER — ONDANSETRON 2 MG/ML
4 INJECTION INTRAMUSCULAR; INTRAVENOUS EVERY 6 HOURS PRN
Status: CANCELLED | OUTPATIENT
Start: 2019-01-01

## 2019-01-01 RX ORDER — ONDANSETRON 2 MG/ML
INJECTION INTRAMUSCULAR; INTRAVENOUS
Status: DISCONTINUED
Start: 2019-01-01 | End: 2019-01-01 | Stop reason: WASHOUT

## 2019-01-01 RX ORDER — MORPHINE SULFATE 4 MG/ML
4 INJECTION, SOLUTION INTRAMUSCULAR; INTRAVENOUS EVERY 10 MIN PRN
Status: DISCONTINUED | OUTPATIENT
Start: 2019-01-01 | End: 2019-01-01

## 2019-01-01 RX ORDER — POTASSIUM CHLORIDE 1.5 G/1.77G
40 POWDER, FOR SOLUTION ORAL ONCE
Status: COMPLETED | OUTPATIENT
Start: 2019-01-01 | End: 2019-01-01

## 2019-01-01 RX ORDER — KETAMINE HYDROCHLORIDE 50 MG/ML
20 INJECTION, SOLUTION, CONCENTRATE INTRAMUSCULAR; INTRAVENOUS ONCE
Status: DISCONTINUED | OUTPATIENT
Start: 2019-01-01 | End: 2019-01-01

## 2019-01-01 RX ORDER — NITROGLYCERIN 20 MG/100ML
INJECTION INTRAVENOUS
Status: COMPLETED
Start: 2019-01-01 | End: 2019-01-01

## 2019-01-01 RX ORDER — METOCLOPRAMIDE HYDROCHLORIDE 5 MG/ML
5 INJECTION INTRAMUSCULAR; INTRAVENOUS ONCE
Status: COMPLETED | OUTPATIENT
Start: 2019-01-01 | End: 2019-01-01

## 2019-01-01 RX ORDER — ACETAMINOPHEN 10 MG/ML
1000 INJECTION, SOLUTION INTRAVENOUS EVERY 6 HOURS PRN
Status: DISCONTINUED | OUTPATIENT
Start: 2019-01-01 | End: 2019-01-01

## 2019-01-01 RX ORDER — HYDROMORPHONE HYDROCHLORIDE 1 MG/ML
1 INJECTION, SOLUTION INTRAMUSCULAR; INTRAVENOUS; SUBCUTANEOUS EVERY 4 HOURS PRN
Status: DISCONTINUED | OUTPATIENT
Start: 2019-01-01 | End: 2019-01-01

## 2019-01-01 RX ORDER — DIAPER,BRIEF,INFANT-TODD,DISP
EACH MISCELLANEOUS 2 TIMES DAILY
COMMUNITY
End: 2019-01-01 | Stop reason: ALTCHOICE

## 2019-01-01 RX ORDER — DIPHENHYDRAMINE HYDROCHLORIDE 50 MG/ML
INJECTION INTRAMUSCULAR; INTRAVENOUS AS NEEDED
Status: DISCONTINUED | OUTPATIENT
Start: 2019-01-01 | End: 2019-01-01 | Stop reason: SURG

## 2019-01-01 RX ORDER — LORAZEPAM 1 MG/1
1 TABLET ORAL EVERY 4 HOURS PRN
Status: CANCELLED | OUTPATIENT
Start: 2019-01-01

## 2019-01-01 RX ORDER — POLYETHYLENE GLYCOL 3350 17 G/17G
17 POWDER, FOR SOLUTION ORAL DAILY PRN
Refills: 0 | Status: ON HOLD | COMMUNITY
Start: 2019-01-01 | End: 2019-01-01

## 2019-01-01 RX ORDER — HEPARIN SODIUM 5000 [USP'U]/ML
5000 INJECTION, SOLUTION INTRAVENOUS; SUBCUTANEOUS EVERY 8 HOURS SCHEDULED
Status: DISCONTINUED | OUTPATIENT
Start: 2019-01-01 | End: 2019-01-01

## 2019-01-01 RX ORDER — FAMOTIDINE 10 MG/ML
20 INJECTION, SOLUTION INTRAVENOUS 2 TIMES DAILY
Status: DISCONTINUED | OUTPATIENT
Start: 2019-01-01 | End: 2019-01-01

## 2019-01-01 RX ORDER — PANTOPRAZOLE SODIUM 40 MG/1
40 TABLET, DELAYED RELEASE ORAL
Status: DISCONTINUED | OUTPATIENT
Start: 2019-01-01 | End: 2019-01-01

## 2019-01-01 RX ORDER — HYDROMORPHONE HYDROCHLORIDE 4 MG/1
4 TABLET ORAL EVERY 4 HOURS PRN
Qty: 60 TABLET | Refills: 0 | Status: ON HOLD | OUTPATIENT
Start: 2019-01-01 | End: 2019-01-01

## 2019-01-01 RX ORDER — HYDROMORPHONE HYDROCHLORIDE 4 MG/1
4 TABLET ORAL EVERY 4 HOURS PRN
Qty: 20 TABLET | Refills: 0 | Status: SHIPPED | OUTPATIENT
Start: 2019-01-01 | End: 2019-01-01

## 2019-01-01 RX ORDER — HYDROMORPHONE HYDROCHLORIDE 1 MG/ML
0.5 INJECTION, SOLUTION INTRAMUSCULAR; INTRAVENOUS; SUBCUTANEOUS
Status: CANCELLED | OUTPATIENT
Start: 2019-01-01

## 2019-01-01 RX ORDER — SODIUM CHLORIDE 0.9 % (FLUSH) 0.9 %
10 SYRINGE (ML) INJECTION AS NEEDED
Status: CANCELLED | OUTPATIENT
Start: 2019-01-01

## 2019-01-01 RX ORDER — SODIUM PHOSPHATE, DIBASIC AND SODIUM PHOSPHATE, MONOBASIC 7; 19 G/133ML; G/133ML
1 ENEMA RECTAL ONCE AS NEEDED
Status: DISCONTINUED | OUTPATIENT
Start: 2019-01-01 | End: 2019-01-01

## 2019-01-01 RX ADMIN — METHYLPREDNISOLONE SODIUM SUCCINATE 40 MG: 40 INJECTION, POWDER, LYOPHILIZED, FOR SOLUTION INTRAMUSCULAR; INTRAVENOUS at 13:00:00

## 2019-01-01 RX ADMIN — METHYLPREDNISOLONE SODIUM SUCCINATE 40 MG: 40 INJECTION, POWDER, LYOPHILIZED, FOR SOLUTION INTRAMUSCULAR; INTRAVENOUS at 00:57:00

## 2019-01-01 RX ADMIN — DIPHENHYDRAMINE HYDROCHLORIDE 50 MG: 50 INJECTION INTRAMUSCULAR; INTRAVENOUS at 14:55:00

## 2019-01-01 RX ADMIN — LIDOCAINE HYDROCHLORIDE 50 MG: 10 INJECTION, SOLUTION EPIDURAL; INFILTRATION; INTRACAUDAL; PERINEURAL at 13:51:00

## 2019-01-01 RX ADMIN — ROCURONIUM BROMIDE 15 MG: 10 INJECTION, SOLUTION INTRAVENOUS at 13:58:00

## 2019-01-01 RX ADMIN — DIPHENHYDRAMINE HYDROCHLORIDE 25 MG: 50 INJECTION INTRAMUSCULAR; INTRAVENOUS at 04:27:00

## 2019-01-01 RX ADMIN — METHYLPREDNISOLONE SODIUM SUCCINATE 40 MG: 40 INJECTION, POWDER, LYOPHILIZED, FOR SOLUTION INTRAMUSCULAR; INTRAVENOUS at 12:35:00

## 2019-01-01 RX ADMIN — LIDOCAINE HYDROCHLORIDE 50 MG: 10 INJECTION, SOLUTION EPIDURAL; INFILTRATION; INTRACAUDAL; PERINEURAL at 10:36:00

## 2019-01-01 RX ADMIN — MONTELUKAST SODIUM 10 MG: 10 TABLET ORAL at 21:24:00

## 2019-01-01 RX ADMIN — FAMOTIDINE 20 MG: 10 INJECTION, SOLUTION INTRAVENOUS at 20:40:00

## 2019-01-01 RX ADMIN — HYDROMORPHONE HYDROCHLORIDE 0.5 MG: 1 INJECTION, SOLUTION INTRAMUSCULAR; INTRAVENOUS; SUBCUTANEOUS at 09:49:00

## 2019-01-01 RX ADMIN — ROCURONIUM BROMIDE 30 MG: 10 INJECTION, SOLUTION INTRAVENOUS at 14:41:00

## 2019-01-01 RX ADMIN — ONDANSETRON 4 MG: 2 INJECTION INTRAMUSCULAR; INTRAVENOUS at 10:40:00

## 2019-01-01 RX ADMIN — ONDANSETRON 4 MG: 2 INJECTION INTRAMUSCULAR; INTRAVENOUS at 15:05:00

## 2019-01-01 RX ADMIN — METHYLPREDNISOLONE SODIUM SUCCINATE 40 MG: 40 INJECTION, POWDER, LYOPHILIZED, FOR SOLUTION INTRAMUSCULAR; INTRAVENOUS at 23:26:00

## 2019-01-01 RX ADMIN — SODIUM CHLORIDE, SODIUM LACTATE, POTASSIUM CHLORIDE, CALCIUM CHLORIDE: 600; 310; 30; 20 INJECTION, SOLUTION INTRAVENOUS at 09:15:00

## 2019-01-01 RX ADMIN — SODIUM CHLORIDE: 9 INJECTION, SOLUTION INTRAVENOUS at 21:38:00

## 2019-01-01 RX ADMIN — GLYCOPYRROLATE 0.6 MG: 0.2 INJECTION INTRAMUSCULAR; INTRAVENOUS at 10:06:00

## 2019-01-01 RX ADMIN — SODIUM CHLORIDE, SODIUM LACTATE, POTASSIUM CHLORIDE, CALCIUM CHLORIDE: 600; 310; 30; 20 INJECTION, SOLUTION INTRAVENOUS at 09:53:00

## 2019-01-01 RX ADMIN — ROCURONIUM BROMIDE 5 MG: 10 INJECTION, SOLUTION INTRAVENOUS at 14:30:00

## 2019-01-01 RX ADMIN — MIDAZOLAM HYDROCHLORIDE 2 MG: 1 INJECTION INTRAMUSCULAR; INTRAVENOUS at 09:34:00

## 2019-01-01 RX ADMIN — HYDROMORPHONE HYDROCHLORIDE 2 MG: 2 TABLET ORAL at 10:48:00

## 2019-01-01 RX ADMIN — DIPHENHYDRAMINE HYDROCHLORIDE 50 MG: 50 INJECTION INTRAMUSCULAR; INTRAVENOUS at 12:35:00

## 2019-01-01 RX ADMIN — POTASSIUM CHLORIDE 40 MEQ: 20 TABLET, EXTENDED RELEASE ORAL at 18:49:00

## 2019-01-01 RX ADMIN — HYDROMORPHONE HYDROCHLORIDE 2 MG: 2 TABLET ORAL at 09:15:00

## 2019-01-01 RX ADMIN — METHYLPREDNISOLONE SODIUM SUCCINATE 40 MG: 40 INJECTION, POWDER, LYOPHILIZED, FOR SOLUTION INTRAMUSCULAR; INTRAVENOUS at 18:48:00

## 2019-01-01 RX ADMIN — LIDOCAINE HYDROCHLORIDE 25 MG: 10 INJECTION, SOLUTION EPIDURAL; INFILTRATION; INTRACAUDAL; PERINEURAL at 09:39:00

## 2019-01-01 RX ADMIN — SODIUM CHLORIDE: 9 INJECTION, SOLUTION INTRAVENOUS at 16:04:00

## 2019-01-01 RX ADMIN — METHYLPREDNISOLONE SODIUM SUCCINATE 40 MG: 40 INJECTION, POWDER, LYOPHILIZED, FOR SOLUTION INTRAMUSCULAR; INTRAVENOUS at 07:37:00

## 2019-01-01 RX ADMIN — HYDROMORPHONE HYDROCHLORIDE 2 MG: 2 TABLET ORAL at 00:53:00

## 2019-01-01 RX ADMIN — SODIUM CHLORIDE, SODIUM LACTATE, POTASSIUM CHLORIDE, CALCIUM CHLORIDE: 600; 310; 30; 20 INJECTION, SOLUTION INTRAVENOUS at 10:18:00

## 2019-01-01 RX ADMIN — POTASSIUM CHLORIDE 40 MEQ: 20 TABLET, EXTENDED RELEASE ORAL at 15:08:00

## 2019-01-01 RX ADMIN — ONDANSETRON 4 MG: 2 INJECTION INTRAMUSCULAR; INTRAVENOUS at 16:08:00

## 2019-01-01 RX ADMIN — HYDROMORPHONE HYDROCHLORIDE 0.5 MG: 1 INJECTION, SOLUTION INTRAMUSCULAR; INTRAVENOUS; SUBCUTANEOUS at 09:54:00

## 2019-01-01 RX ADMIN — SODIUM CHLORIDE, SODIUM LACTATE, POTASSIUM CHLORIDE, CALCIUM CHLORIDE: 600; 310; 30; 20 INJECTION, SOLUTION INTRAVENOUS at 14:28:00

## 2019-01-01 RX ADMIN — HYDROMORPHONE HYDROCHLORIDE 4 MG: 2 TABLET ORAL at 19:52:00

## 2019-01-01 RX ADMIN — HYDROMORPHONE HYDROCHLORIDE 2 MG: 2 TABLET ORAL at 12:52:00

## 2019-01-01 RX ADMIN — PHENYLEPHRINE HCL 100 MCG: 10 MG/ML VIAL (ML) INJECTION at 14:05:00

## 2019-01-01 RX ADMIN — SODIUM CHLORIDE, SODIUM LACTATE, POTASSIUM CHLORIDE, CALCIUM CHLORIDE: 600; 310; 30; 20 INJECTION, SOLUTION INTRAVENOUS at 11:11:00

## 2019-01-01 RX ADMIN — ONDANSETRON 4 MG: 2 INJECTION INTRAMUSCULAR; INTRAVENOUS at 10:01:00

## 2019-01-01 RX ADMIN — SODIUM CHLORIDE 100 ML/HR: 9 INJECTION, SOLUTION INTRAVENOUS at 14:34:00

## 2019-01-01 RX ADMIN — NEOSTIGMINE METHYLSULFATE 3 MG: 0.5 INJECTION INTRAVENOUS at 10:06:00

## 2019-01-01 RX ADMIN — METHYLPREDNISOLONE SODIUM SUCCINATE 40 MG: 40 INJECTION, POWDER, LYOPHILIZED, FOR SOLUTION INTRAMUSCULAR; INTRAVENOUS at 04:31:00

## 2019-01-01 RX ADMIN — DEXAMETHASONE SODIUM PHOSPHATE 4 MG: 4 MG/ML VIAL (ML) INJECTION at 13:56:00

## 2019-01-01 RX ADMIN — SODIUM CHLORIDE, SODIUM LACTATE, POTASSIUM CHLORIDE, CALCIUM CHLORIDE: 600; 310; 30; 20 INJECTION, SOLUTION INTRAVENOUS at 09:34:00

## 2019-01-01 RX ADMIN — HYDROMORPHONE HYDROCHLORIDE 0.5 MG: 1 INJECTION, SOLUTION INTRAMUSCULAR; INTRAVENOUS; SUBCUTANEOUS at 09:58:00

## 2019-01-01 RX ADMIN — HYDROMORPHONE HYDROCHLORIDE 0.5 MG: 1 INJECTION, SOLUTION INTRAMUSCULAR; INTRAVENOUS; SUBCUTANEOUS at 17:06:00

## 2019-01-01 RX ADMIN — HYDROMORPHONE HYDROCHLORIDE 2 MG: 2 TABLET ORAL at 21:24:00

## 2019-01-01 RX ADMIN — CEFAZOLIN SODIUM/WATER 2 G: 2 G/20 ML SYRINGE (ML) INTRAVENOUS at 10:18:00

## 2019-01-01 RX ADMIN — ONDANSETRON 8 MG: 2 INJECTION INTRAMUSCULAR; INTRAVENOUS at 10:19:00

## 2019-01-01 RX ADMIN — PHENYLEPHRINE HCL 100 MCG: 10 MG/ML VIAL (ML) INJECTION at 09:18:00

## 2019-01-01 RX ADMIN — GLYCOPYRROLATE 0.8 MG: 0.2 INJECTION INTRAMUSCULAR; INTRAVENOUS at 16:07:00

## 2019-01-01 RX ADMIN — SODIUM CHLORIDE, SODIUM LACTATE, POTASSIUM CHLORIDE, CALCIUM CHLORIDE: 600; 310; 30; 20 INJECTION, SOLUTION INTRAVENOUS at 14:54:00

## 2019-01-01 RX ADMIN — SODIUM CHLORIDE, SODIUM LACTATE, POTASSIUM CHLORIDE, CALCIUM CHLORIDE: 600; 310; 30; 20 INJECTION, SOLUTION INTRAVENOUS at 13:42:00

## 2019-01-01 RX ADMIN — ROCURONIUM BROMIDE 5 MG: 10 INJECTION, SOLUTION INTRAVENOUS at 13:51:00

## 2019-01-01 RX ADMIN — ROCURONIUM BROMIDE 5 MG: 10 INJECTION, SOLUTION INTRAVENOUS at 10:36:00

## 2019-01-01 RX ADMIN — SODIUM CHLORIDE: 9 INJECTION, SOLUTION INTRAVENOUS at 22:20:00

## 2019-01-01 RX ADMIN — ROCURONIUM BROMIDE 30 MG: 10 INJECTION, SOLUTION INTRAVENOUS at 09:40:00

## 2019-01-01 RX ADMIN — SODIUM CHLORIDE: 9 INJECTION, SOLUTION INTRAVENOUS at 14:35:00

## 2019-01-01 RX ADMIN — DIPHENHYDRAMINE HYDROCHLORIDE 25 MG: 50 INJECTION INTRAMUSCULAR; INTRAVENOUS at 20:39:00

## 2019-01-01 RX ADMIN — DEXAMETHASONE SODIUM PHOSPHATE 4 MG: 4 MG/ML VIAL (ML) INJECTION at 10:01:00

## 2019-01-01 RX ADMIN — MIDAZOLAM HYDROCHLORIDE 2 MG: 1 INJECTION INTRAMUSCULAR; INTRAVENOUS at 13:42:00

## 2019-01-01 RX ADMIN — NEOSTIGMINE METHYLSULFATE 4 MG: 0.5 INJECTION INTRAVENOUS at 16:07:00

## 2019-01-01 RX ADMIN — SODIUM CHLORIDE, SODIUM LACTATE, POTASSIUM CHLORIDE, CALCIUM CHLORIDE: 600; 310; 30; 20 INJECTION, SOLUTION INTRAVENOUS at 10:30:00

## 2019-01-01 RX ADMIN — SODIUM CHLORIDE, SODIUM LACTATE, POTASSIUM CHLORIDE, CALCIUM CHLORIDE: 600; 310; 30; 20 INJECTION, SOLUTION INTRAVENOUS at 10:51:00

## 2019-01-01 RX ADMIN — HYDROMORPHONE HYDROCHLORIDE 2 MG: 2 TABLET ORAL at 00:13:00

## 2019-01-01 RX ADMIN — ROCURONIUM BROMIDE 50 MG: 10 INJECTION, SOLUTION INTRAVENOUS at 09:16:00

## 2019-01-01 RX ADMIN — LIDOCAINE HYDROCHLORIDE 50 MG: 10 INJECTION, SOLUTION EPIDURAL; INFILTRATION; INTRACAUDAL; PERINEURAL at 14:41:00

## 2019-01-04 NOTE — PROGRESS NOTES
Mr. Salena Adkins present today for follow up after Y-90 radioembolization for multifocal hepatocellular carcinoma most recently performed on 10/31/18. 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   He denies abdomi

## 2019-01-07 NOTE — TELEPHONE ENCOUNTER
The pharmacy called to see if patient needs a refill or discontinuation of the medication.  Please call the phone number of file for pharmacy extension [phone]

## 2019-01-14 NOTE — PRE-SEDATION ASSESSMENT
[place] [facility]  IR Pre-Procedure Sedation Assessment    History of snoring or sleep or apnea?    No    History of previous problems with anesthesia or sedation  No    Physical Findings:  Neck: nl ROM  CV: RRR  PULM: normal respiratory rate/effor

## 2019-01-14 NOTE — PROCEDURES
[facility]  Procedure Note    Yen Baptistekey Patient Status:  Outpatient in a Bed    1965 MRN [id number]   Location [facility] Attending Jennifer Chi MD   Hosp Day # 0 PCP Roland Ho MD

## 2019-01-14 NOTE — H&P
1430 [facility] Patient Status:  Outpatient in a Bed    1965 MRN [id number]   Location [facility] Attending Jyoti Angulo MD   Hosp Day # 0 ANTONY Galvan

## 2019-03-11 NOTE — H&P
1430 [facility] Patient Status:  Outpatient in a Bed    1965 MRN [id number]   Location [facility] Attending Vamshi Jansen MD   Hosp Day # 0 ANTONY Riley

## 2019-03-11 NOTE — PRE-SEDATION ASSESSMENT
[place] [facility]  IR Pre-Procedure Sedation Assessment    History of snoring or sleep or apnea?    No    History of previous problems with anesthesia or sedation  No    Physical Findings:  Neck: nl ROM  CV: RRR  PULM: normal respiratory rate/effor

## 2019-03-11 NOTE — PROGRESS NOTES
Patient awake and alert x 3, right groin clean, dry, intact, no hematoma, soft.  Patient able to ambulate , instructions given to patient regarding f/u MRI, appointment and lab

## 2019-03-11 NOTE — PROCEDURES
[facility]  Procedure Note    Mozell Fossa Patient Status:  Outpatient in a Bed    1965 MRN [id number]   Location [facility] Attending Megan Montero MD   Hosp Day # 0 PCP Cem Lee MD

## 2019-03-17 NOTE — ED PROVIDER NOTES
Patient Seen in: [facility] AND [facility] Emergency Department    History   Patient presents with:   Allergic Rxn Allergies (immune)    Stated Complaint:     HPI    Patient presents to the emergency department today with approximately 9-10 days of generalized ra 5 MG Oral Tab,  Take 1 mg by mouth daily.  1 mg daily        Family History   Problem Relation Age of Onset   • Colon Cancer Paternal Grandfather        Social History    Tobacco Use      Smoking status: Never Smoker      Smokeless tobacco: Never Used    Al noted.  Psychiatric:  Normal affect. Oriented. No unusual behavior. Interacting well. Patient presents with diffuse urticaria unclear etiology he is not sure what he may have been exposed to.   We will do IV fluids as he states he has been a bit nause within normal limits   CBC WITH DIFFERENTIAL WITH PLATELET    Narrative: The following orders were created for panel order CBC WITH DIFFERENTIAL WITH PLATELET.   Procedure                               Abnormality         Status                     ----

## 2019-03-17 NOTE — ED NOTES
48year old male here with nausea, generalized hives, pt reports receiving last Y-9 treament one week ago, pt reports been having generalized hives for a couple days, taking benadryl with out relief, pt reports increase nausea over last day, PIV placed, la

## 2019-03-17 NOTE — ED INITIAL ASSESSMENT (HPI)
Pt to ED c/o hives to entire body intermittently over the last 10 days. Until today he has been able to control symptoms with Cortisol cream. Hives became worse yesterday.

## 2019-03-18 NOTE — ED PROVIDER NOTES
Patient Seen in: [facility] AND [facility] Emergency Department    History   Patient presents with:   Allergic Rxn Allergies (immune)    Stated Complaint: allergic rxn    HPI    72-year-old male with history of hepatocellular carcinoma, receiving Y 90 radioemboli headaches. All other systems reviewed and are negative. Positive for stated complaint: allergic rxn  Other systems are as noted in HPI. Constitutional and vital signs reviewed. All other systems reviewed and negative except as noted above. me while in ED:          EMERGENCY DEPARTMENT COURSE AND TREATMENT:  Patient's condition was stable during Emergency Department evaluation.      53yoM with urticaria  - I personally reviewed and interpreted all the ED vitals  - afebrile, hemodynamically sta diagnosis)    Disposition:  Discharge  3/18/2019  1:39 am    Follow-up:  [address] [facility]  [address]  Schedule an appointment as soon as possible for a visit in 2 days

## 2019-03-19 NOTE — ED PROVIDER NOTES
Patient Seen in: [facility] AND [facility] Emergency Department    History   Patient presents with: Allergic Rxn Allergies (immune)    Stated Complaint: ALLERGIC REACTION    HPI    30-year-old male presents for complaint of hives.   Patient developed hives 3 day 03/19/19 0856 None (Room air)       Current:/80   Pulse 86   Temp 98.6 °F (37 °C)   Resp 16   Ht 170.2 cm (5' 7\")   Wt 63.5 kg   SpO2 98%   BMI 21.93 kg/m²         Physical Exam   Constitutional: He is oriented to person, place, and time.  He appears Benadryl Pepcid and Solu-Medrol. He has an appointment with an allergist in 2 days. He is to increase his Zyrtec to 4 times daily and add Pepcid twice daily to his regimen. He is discharged with return precautions. Imaging:   No results found.       C

## 2019-03-19 NOTE — ED INITIAL ASSESSMENT (HPI)
ALLERGIC REACTION SINCE Thursday BEEN HERE 2X FOR THE SAME COMPLAINTS, + RED, ITCHY HIVES ALL OVER BODY, NO RESP COMPLAINTS

## 2019-03-19 NOTE — ED NOTES
Feeling better. Redness and hives decreased. Itching improved. Verbalized understanding of d/c instructions and appropriate follow up care. Copy of d/c instructions given to patient upon discharge.

## 2019-05-20 NOTE — PROCEDURES
[facility] HOSP - [facility]  Procedure Note    Isiah Campos Patient Status:  Outpatient    1965 MRN [id number]   Location [facility] CT Attending Kristine Pñea MD   Hosp Day # 0 PCP Corey Reilly MD     Procedure: CT guided rig

## 2019-05-20 NOTE — H&P
1430 [facility] Patient Status:  Outpatient    1965 MRN [id number]   Location [facility] Attending Bhanu Anderson MD   Hosp Day # 0 PCP Dawit Gregg MD     Admitting Diagnosis General: NAD  Neck: No JVD  Lungs: CTA bilat  Heart: RRR, S1, S2  Abdomen: Soft, NT/ND, BS+x4  Extremities: Warm, dry, no LE edema bilat  Pulses: 2+ bilat DP    Results:   Labs:  No results for input(s): RBC, HGB, HCT, MCV, MCH, MCHC, RDW, NEPRELIM, WB

## 2019-05-20 NOTE — PRE-SEDATION ASSESSMENT
[place] [facility]  IR Pre-Procedure Sedation Assessment    History of snoring or sleep or apnea?    No    History of previous problems with anesthesia or sedation  No    Physical Findings:  Neck: nl ROM  CV: RRR  PULM: normal respiratory rate/effor

## 2019-05-21 NOTE — PROGRESS NOTES
Pt was able to eat and drink. He is more awake, slight nausea, refuses meds. Pt has meds at home. , waiting for his ride.  Site is clean, dry and intact

## 2019-06-10 PROBLEM — K52.9 ACUTE COLITIS: Status: ACTIVE | Noted: 2019-01-01

## 2019-06-11 NOTE — BRIEF OP NOTE
[facility] POST ANESTHESIA CARE UNIT  Brief Op Note       Patients Name: Caesra Peña  Attending Physician: Rocky Bhardwaj MD  Operating Physician: Katy Phillips MD  CSN: [id number]     Location:  OR  MRN: [id number]    Date of Birth: [birth year]

## 2019-06-11 NOTE — H&P
[facility]    PATIENT'S NAME: Sabine Alcris   ATTENDING PHYSICIAN: Kelechi Buck MD   PATIENT ACCOUNT#:   [id number]    LOCATION:  [place] [address]  MEDICAL RECORD #:   [id number]       YOB: 1965  ADMISSION DATE: frequency. No one-sided weakness, heat or cold intolerance, or recent change in weight. PHYSICAL EXAMINATION:    GENERAL:  The patient is a well-developed, thin male in mild to moderate physical distress at this time.   VITAL SIGNS:  Temperature 98.3,

## 2019-06-11 NOTE — PLAN OF CARE
Problem: Patient Centered Care  Goal: Patient preferences are identified and integrated in the patient's plan of care  Description  Interventions:  - Provide timely, complete, and accurate information to patient/family  - Incorporate patient and family k maintained. GI and oncology on consult. Stool collection pending for GI panel. Will continue to monitor.

## 2019-06-11 NOTE — CONSULTS
Inpatient oncology consultation    Date of consultation 6/11/2019    Requesting physician Dr. Trevor Carter    Reason for evaluation:  [facility] [address] history of treatment with immunotherapy, appendicitis    History of Present Illness:  Cancer history:  56-year-old male with imaging. CT imaging here at [facility] showed dilated appendix overall constellation of findings indicative of acute appendicitis.   The patient will have surgery today with Dr. Gopal Martinez  Performance Status:  ECOG 0  Past Medical History:  Past Medical History: Forced sexual activity: Not on file    Other Topics      Concerns:         Service: Not Asked        Blood Transfusions: Not Asked        Caffeine Concern: Yes          Coffee        Occupational Exposure: Not Asked        Hobby Hazards: Not As 06/11/2019    ALT 40 06/11/2019    BILT 0.9 06/11/2019    TP 6.2 (L) 06/11/2019    ALB 2.8 (L) 06/11/2019    GLOBULT 3.4 07/22/2016    GLOBULIN 3.4 06/11/2019     06/11/2019    K 4.5 06/11/2019     06/11/2019    CO2 27.0 06/11/2019           Ca

## 2019-06-11 NOTE — ANESTHESIA PREPROCEDURE EVALUATION
Anesthesia PreOp Note    HPI:     Jose J Kaufman is a 48year old male who presents for preoperative consultation requested by: Yoly Davis MD    Date of Surgery: 6/10/2019 - 6/11/2019    Procedure(s):  LAPAROSCOPIC APPENDECTOMY  Indication: Katey Mireles dextrose 5 % 100 mL ADD-vantage 3.375 g Intravenous Q8H Nusrat Ji MD Last Rate: 25 mL/hr at 06/11/19 0952 3.375 g at 06/11/19 0952   [MAR Hold] ondansetron HCl (ZOFRAN) injection 4 mg 4 mg Intravenous Q4H PRN Tori Fernández PA-C 4 mg at 06/11/19 12 Drug use: No      Sexual activity: Not on file    Lifestyle      Physical activity:        Days per week: Not on file        Minutes per session: Not on file      Stress: Not on file    Relationships      Social connections:        Talks on phone: Not o respiration is 19 and oxygen saturation is 95%.     06/11/19  0300 06/11/19  0547 06/11/19  0700 06/11/19  1255   BP: 112/69 114/66  137/76   Pulse: 88 98  107   Resp: 18 16  19   Temp: 98 °F (36.7 °C) 98.3 °F (36.8 °C)     TempSrc: Oral Oral     SpO2: 96%

## 2019-06-11 NOTE — CONSULTS
[facility]   Gastroenterology Consultation Note    Henrik Coates  Patient Status:    Inpatient  Date of Admission:         6/10/2019, Hospital day #1  Attending:   Tamar Martínez MD  PCP:     Percy Ortiz MD    Reason for Con use/No ETOH  - Denies illicit drug use  + [facility] RN       History:  Past Medical History:   Diagnosis Date   • Cancer ([facility] [address]    • Personal history of antineoplastic chemotherapy    • Prediabetes      Past Surgical History:   Procedure Laterality Date   • COLON resp. rate 16, weight 152 lb 6.4 oz (69.1 kg), SpO2 94 %. Body mass index is 23.87 kg/m².     Gen: WDWN male patient appears quite uncomfortable 2/2 abdominal pain  HEENT: conjunctiva pink, the sclera appears anicteric  CV: RRR  Lung: moves air well; no lab right and trace left pleural effusions, new since prior. Probable associated compressive atelectasis involving the right greater than left lower lobes.    5. Small nodule or lymph node along the anteroinferior margin of the left adrenal gland appears [place]

## 2019-06-11 NOTE — CONSULTS
[facility]    Report of Consultation    Gerhardt Gander Patient Status:  Inpatient    1965 MRN [id number]   Location [facility] 4W/SW/SE Attending Erick Rodriguez MD   Hosp Day # 1 PCP Jeronimo Basilio MD     Date of Adm 3 mL, Intravenous, PRN  •  Heparin Sodium (Porcine) 5000 UNIT/ML injection 5,000 Units, 5,000 Units, Subcutaneous, 2 times per day  •  0.9%  NaCl infusion, , Intravenous, Continuous  •  acetaminophen (TYLENOL) tab 650 mg, 650 mg, Oral, Q6H PRN  •  traMADol 06/11/2019    ALB 2.8 (L) 06/11/2019    ALKPHO 138 (H) 06/11/2019    BILT 0.9 06/11/2019    TP 6.2 (L) 06/11/2019    AST 22 06/11/2019    ALT 40 06/11/2019    INR 1.3 (H) 10/25/2018    T4F 1.3 04/10/2019    TSH 0.689 04/10/2019    PSA 0.8 10/10/2018    ESR

## 2019-06-11 NOTE — ANESTHESIA POSTPROCEDURE EVALUATION
Patient: Yunier Alcantaraumbdar    Procedure Summary     Date:  06/11/19 Room / Location:  [address] [place] [address] MAIN OR 02 / [address] [place] [address] MAIN OR    Anesthesia Start:  0173 Anesthesia Stop:  5110    Procedure:  LAPAROSCOPIC APPENDECTOMY (N/A Abdomen) Diagnosis:       Appendicitis, ac

## 2019-06-11 NOTE — ANESTHESIA PROCEDURE NOTES
Airway  Urgency: elective      General Information and Staff    Patient location during procedure: OR  Anesthesiologist: Chase Foster MD  Resident/CRNA: Deneen Gill CRNA  Performed: CRNA     Indications and Patient Condition  Indications for ai

## 2019-06-12 NOTE — PROGRESS NOTES
[facility]    Progress Note    Yady Ospina Patient Status:  Inpatient    1965 MRN [id number]   Location [facility] 4W/SW/SE Attending Faviola Yeh MD   Hosp Day # 2 PCP Ander Colon MD       Subjective:   Rimma Officer - 1.30 mg/dL Final   03/17/2019 1.24 0.70 - 1.30 mg/dL Final   02/21/2019 0.93 0.70 - 1.30 mg/dL Final   01/08/2019 1.05 0.50 - 1.50 mg/dL Final   11/29/2018 0.93 0.50 - 1.50 mg/dL Final   10/25/2018 0.87 0.50 - 1.50 mg/dL Final   10/10/2018 0.88 0.50 - 1. WBC   Date Value Ref Range Status   06/12/2019 15.5 (H) 4.0 - 11.0 x10(3) uL Final   06/11/2019 16.0 (H) 4.0 - 11.0 x10(3) uL Final   04/10/2019 8.4 4.0 - 11.0 x10(3) uL Final   03/19/2019 18.3 (H) 4.0 - 11.0 x10(3) uL Final   03/17/2019 9.8 4.0 - 11. 0 disease. 3. There is a history of multifocal hepatocellular carcinoma. Treated peripherally calcified liver lesions appear grossly unchanged since recent prior CT but are incompletely assessed by this noncontrast modality.   Please refer to dedicated prio

## 2019-06-12 NOTE — PROGRESS NOTES
[place] [facility]    Progress Note    Jaguar Waldrop Patient Status:  Inpatient    1965 MRN [id number]   Location [facility] 4W/SW/SE Attending Deonna Fatima MD   Hosp Day # 2 PCP Janine Crocker MD            Subjective: Ct Abdomen+pelvis(cpt=74176)    Result Date: 6/11/2019  CONCLUSION:   1. The appendix is dilated up to 1.1 cm, a new finding since 5/6/2019. The terminal ileum and cecum are opacified with contrast while the appendix is not.   There is also mil

## 2019-06-12 NOTE — PROGRESS NOTES
[facility]    Hematology/Oncology   Progress Note    Serenity Tapia Patient Status:  Inpatient    1965 MRN [id number]   Location [facility] 4W/SW/SE Attending Rhett Roberts MD   Hosp Day # 2 PCP Filomena Wright MD calcified liver lesions appear grossly unchanged since recent prior CT but are incompletely assessed by this noncontrast modality. Please refer to dedicated prior liver MR imaging for further details.   4. Small right and trace left pleural effusions, new

## 2019-06-12 NOTE — PLAN OF CARE
Problem: Patient Centered Care  Goal: Patient preferences are identified and integrated in the patient's plan of care  Description  Interventions:  - What would you like us to know as we care for you?  I work at the hospital  - Provide timely, complete, a appy with Dr. Mauro Ho. Appears drowsy post-op, but AOx4. Denies pain, N/V. Able to void with no issues. JOHN drain RLQ, surgical dressings CDI. Continue NPO w ice chips.

## 2019-06-12 NOTE — PLAN OF CARE
Problem: Patient Centered Care  Goal: Patient preferences are identified and integrated in the patient's plan of care  Description  Interventions:  - What would you like us to know as we care for you?  I work at the hospital  - Provide timely, complete, a ADULT  Goal: Absence of fever/infection during anticipated neutropenic period  Description  INTERVENTIONS  - Monitor WBC  - Administer growth factors as ordered  - Implement neutropenic guidelines  Outcome: Progressing     Problem: DISCHARGE PLANNING  Goal routine/schedule  - Consider collaborating with pharmacy to review patient's medication profile  Outcome: Progressing     Pt A/O x4. VSS. NPO. JOHN dressing changed PRN. JOHN intact & draining to bulb suction. Refusing heparin subq. Ambulating independently.  I

## 2019-06-12 NOTE — PAYOR COMM NOTE
--------------  CONTINUED STAY REVIEW----REQUESTING ADDITIONAL DAY 6/12      Payor: Mary WILSON  Subscriber #:  [id number]  Authorization Number: [id number]    Admit date: 6/10/19  Admit time: 2231    Admitting Physician: Erick Rodriguez MD  At ALB 2.8*  --     143   K 4.5 4.2    113*   CO2 27.0 26.0   ALKPHO 138*  --    AST 22  --    ALT 40  --    BILT 0.9  --    TP 6.2*  --          No results for input(s): LIP, LOLI in the last 168 hours.     No results for input(s): TROP, CK in the 09/13/2018 >60 >=60 Final       Comment:          Estimated GFR units: mL/min/1.73 square meters  eGFR calculated by the CKD-EPI equation.            GFR NON-   Date Value Ref Range Status   07/22/2016 60.0   Final            GFR, Non-Afric Ct Abdomen+pelvis(cpt=74176)     Result Date: 6/11/2019  CONCLUSION:   1. The appendix is dilated up to 1.1 cm, a new finding since 5/6/2019. The terminal ileum and cecum are opacified with contrast while the appendix is not.   There is also mild periappen 6/11/2019 1356 Given 4 mg Intravenous Maryse Duran CRNA      docusate sodium (COLACE) cap 100 mg     Date Action Dose Route User    6/12/2019 0819 Given 100 mg Oral John Varela RN    6/11/2019 2004 Given 100 mg Oral Leigha Govea RN 6/11/2019 1405 Given 100 mcg Intravenous Kassy Hernandez CRNA      Piperacillin Sod-Tazobactam So (ZOSYN) 3.375 g in dextrose 5 % 100 mL ADD-vantage     Date Action Dose Route User    6/12/2019 0819 New Bag 3.375 g Intravenous Penni Sandhoff, RN    6/

## 2019-06-12 NOTE — PLAN OF CARE
Problem: Patient Centered Care  Goal: Patient preferences are identified and integrated in the patient's plan of care  Description  Interventions:  - What would you like us to know as we care for you?  I work at the hospital  - Provide timely, complete, a Pain was tolerable as verbalized. On IVF and zosyn. NPO except ice chips/ sips of meds. JOHN drain intact.

## 2019-06-13 NOTE — PROGRESS NOTES
[place] [facility]    Progress Note    Roz Way Patient Status:  Inpatient    1965 MRN [id number]   Location [facility] 4W/SW/SE Attending Mary Islas MD   Hosp Day # 3 PCP José Miguel Collins MD       Subjective:   eLi Martinez hours.    Creatinine   Date Value Ref Range Status   06/12/2019 0.88 0.70 - 1.30 mg/dL Final   06/11/2019 0.99 0.70 - 1.30 mg/dL Final   04/10/2019 0.95 0.70 - 1.30 mg/dL Final   03/17/2019 1.24 0.70 - 1.30 mg/dL Final   02/21/2019 0.93 0.70 - 1.30 mg/dL F 01/08/2019 >60 >=60 Final   11/29/2018 >60 >=60 Final   10/25/2018 >60 >=60 Final   10/10/2018 >60 >=60 Final   09/27/2018 >60 >=60 Final   09/13/2018 >60 >=60 Final     WBC   Date Value Ref Range Status   06/13/2019 12.4 (H) 4.0 - 11.0 x10(3) uL Final wall thickening with minimal surrounding inflammation and trace fluid in the right pericolic gutter as well as dependently in the pelvis as well as in the inferior mesentery. These findings may be reactive to liver disease.   3. There is a history of multi

## 2019-06-13 NOTE — PROGRESS NOTES
[facility]    Hematology/Oncology   Progress Note    Seth Oliveros Patient Status:  Inpatient    1965 MRN [id number]   Location [facility] 4W/SW/SE Attending Barbara Adams MD   Hosp Day # 3 PCP Sybil Ocasio MD 4. Small right and trace left pleural effusions, new since prior. Probable associated compressive atelectasis involving the right greater than left lower lobes.    5. Small nodule or lymph node along the anteroinferior margin of the left adrenal gland appe

## 2019-06-13 NOTE — PLAN OF CARE
Problem: Patient Centered Care  Goal: Patient preferences are identified and integrated in the patient's plan of care  Description  Interventions:  - What would you like us to know as we care for you?  I work at the hospital  - Provide timely, complete, a ADULT  Goal: Absence of fever/infection during anticipated neutropenic period  Description  INTERVENTIONS  - Monitor WBC  - Administer growth factors as ordered  - Implement neutropenic guidelines  Outcome: Progressing     Problem: DISCHARGE PLANNING  Goal routine/schedule  - Consider collaborating with pharmacy to review patient's medication profile  Outcome: Progressing    Patient started on clear liquids, will advance as tolerated. Continues to c/o pain, managed with Dilaudid 0.2mg. N/V managed w Zofran.

## 2019-06-13 NOTE — PROGRESS NOTES
[facility]    Progress Note    Merle Acevedo Patient Status:  Inpatient    1965 MRN [id number]   Location [facility] 4W/SW/SE Attending Piedad Eaton MD   Hosp Day # 3 PCP Mouna Collier MD            Subjective:  143   K 4.5 4.2    113*   CO2 27.0 26.0   ALKPHO 138*  --    AST 22  --    ALT 40  --    BILT 0.9  --    TP 6.2*  --              Ct Abdomen+pelvis(cpt=74176)    Result Date: 6/11/2019  CONCLUSION:   1.  The appendix is dilated up to 1.1 cm, avoid narcotics, on IV tylenol    Olive Shields MD [facility]  General Surgery  [facility]  6/13/2019  7:53 AM

## 2019-06-13 NOTE — PAYOR COMM NOTE
--------------  CONTINUED STAY REVIEW----REQUESTING ADDITIONAL DAY 6/13      Payor: Vasu Paula [facility] EPO  Subscriber #:  [id number]  Authorization Number: [id number]    Admit date: 6/10/19  Admit time: 2231    Admitting Physician: Rhett Roberts MD  At Recent Labs   Lab 06/11/19  0514 06/12/19  0532   * 139*   BUN 23* 15   CREATSERUM 0.99 0.88   GFRAA 102 115   GFRNAA 88 99   CA 8.0* 8.2*   ALB 2.8*  --     143   K 4.5 4.2    113*   CO2 27.0 26.0   ALKPHO 138*  --    AST 22  --    ALT 09/27/2018 >60 >=60 Final       Comment:          Estimated GFR units: mL/min/1.73 square meters  eGFR calculated by the CKD-EPI equation.    09/13/2018 >60 >=60 Final       Comment:          Estimated GFR units: mL/min/1.73 square meters  eGFR calculated b • docusate sodium  100 mg Oral BID   • Heparin Sodium (Porcine)  5,000 Units Subcutaneous Q8H [address]         Continuous Infusions:   • sodium chloride 100 mL/hr at 06/12/19 2149               Ct Abdomen+pelvis(cpt=74176)     Result Date: 6/11/2019  CONCLUSION Date Action Dose Route User    6/13/2019 0846 Given 1000 mg Intravenous Prisca Acuña, ANNA    6/13/2019 0327 Given 1000 mg Intravenous Sofía Agosto RN    6/12/2019 2149 Given 1000 mg Intravenous Lindsay Govea RN      docusate sodium (CO Plan:

## 2019-06-13 NOTE — PLAN OF CARE
Problem: Patient Centered Care  Goal: Patient preferences are identified and integrated in the patient's plan of care  Description  Interventions:  - What would you like us to know as we care for you?  I work at the hospital  - Provide timely, complete, a ADULT  Goal: Absence of fever/infection during anticipated neutropenic period  Description  INTERVENTIONS  - Monitor WBC  - Administer growth factors as ordered  - Implement neutropenic guidelines  Outcome: Progressing     Problem: DISCHARGE PLANNING  Goal routine/schedule  - Consider collaborating with pharmacy to review patient's medication profile  Outcome: Progressing   Patients complaining of pain 9/10, dilaudid 0.4mg increased to 1mg q 2hrs prn and IV tylenol scheduled added by Dr. Nader Sargent.  Pain is s

## 2019-06-14 NOTE — PLAN OF CARE
Problem: Patient Centered Care  Goal: Patient preferences are identified and integrated in the patient's plan of care  Description  Interventions:  - What would you like us to know as we care for you?  I work at the hospital  - Provide timely, complete, a ADULT  Goal: Absence of fever/infection during anticipated neutropenic period  Description  INTERVENTIONS  - Monitor WBC  - Administer growth factors as ordered  - Implement neutropenic guidelines  Outcome: Progressing     Problem: DISCHARGE PLANNING  Goal routine/schedule  - Consider collaborating with pharmacy to review patient's medication profile  Outcome: Bette Cherry is up ad solomon. He is voiding. Refused SCDs and heparin. Scheduled IV tylenol for pain. On IV zosyn.  Gauze and tegaderm dressings

## 2019-06-14 NOTE — PAYOR COMM NOTE
--------------  CONTINUED STAY REVIEW----REQUESTING ADDITIONAL DAY 6/14     Payor: Ken FERNANDEZ EPO  Subscriber #:  [id number]  Authorization Number: [id number]    Admit date: 6/10/19  Admit time: 2231    Admitting Physician: Tamar Martínez MD  Att .0 204.0 192.0              Recent Labs   Lab 06/11/19  0514 06/12/19  0532   * 139*   BUN 23* 15   CREATSERUM 0.99 0.88   GFRAA 102 115   GFRNAA 88 99   CA 8.0* 8.2*   ALB 2.8*  --     143   K 4.5 4.2    113*   CO2 27.0 26.0   AL eGFR calculated by the CKD-EPI equation. 09/27/2018 >60 >=60 Final       Comment:          Estimated GFR units: mL/min/1.73 square meters  eGFR calculated by the CKD-EPI equation.    09/13/2018 >60 >=60 Final       Comment:          Estimated GFR units: m • piperacillin-tazobactam  3.375 g Intravenous Q8H   • docusate sodium  100 mg Oral BID   • Heparin Sodium (Porcine)  5,000 Units Subcutaneous Q8H [address]         Continuous Infusions:   • sodium chloride 100 mL/hr at 06/14/19 0228                  TON ALBERTS 6378 06/13/19  1615 06/14/19  0523   RBC 4.77 4.85 4.47   HGB 14.4 14.6 13.5   HCT 45.1 45.6 41.5   MCV 94.5 94.0 92.8   MCH 30.2 30.1 30.2   MCHC 31.9 32.0 32.5   RDW 13.4 13.5 13.1   NEPRELIM 11.01* 12.45* 7.46   WBC 12.4* 13.8* 9.1   .0 204.0 192 ondansetron HCl (ZOFRAN) injection 4 mg     Date Action Dose Route User    6/14/2019 1111 Given 4 mg Intravenous Santiago Bowles RN    6/13/2019 2128 Given 4 mg Intravenous Jennifer Steward RN    6/13/2019 1448 Given 4 mg Intravenous KHOA Fuentes

## 2019-06-14 NOTE — PROGRESS NOTES
[facility]    Progress Note    Julio César Torres Patient Status:  Inpatient    1965 MRN [id number]   Location [facility] 4W/SW/SE Attending Favio Chaudhary MD   Hosp Day # 4 PCP Roel Garcia MD       Subjective:   Stephanie Aldana input(s): PGLU in the last 168 hours.     Creatinine   Date Value Ref Range Status   06/12/2019 0.88 0.70 - 1.30 mg/dL Final   06/11/2019 0.99 0.70 - 1.30 mg/dL Final   04/10/2019 0.95 0.70 - 1.30 mg/dL Final   03/17/2019 1.24 0.70 - 1.30 mg/dL Final   02/2 02/21/2019 >60 >=60 Final   01/08/2019 >60 >=60 Final   11/29/2018 >60 >=60 Final   10/25/2018 >60 >=60 Final   10/10/2018 >60 >=60 Final   09/27/2018 >60 >=60 Final   09/13/2018 >60 >=60 Final     WBC   Date Value Ref Range Status   06/14/2019 9.1 4.0 -

## 2019-06-14 NOTE — PROGRESS NOTES
[place] [facility]    Progress Note    Serenity Tapia Patient Status:  Inpatient    1965 MRN [id number]   Location [facility] 4W/SW/SE Attending Rhett Roberts MD   Hosp Day # 4 PCP Filomena Wright MD            Subjective: ALB 2.8*  --     143   K 4.5 4.2    113*   CO2 27.0 26.0   ALKPHO 138*  --    AST 22  --    ALT 40  --    BILT 0.9  --    TP 6.2*  --                          Assessment and Plan:       Acute colitis    Acute appendicitis  Pt doing well  Poss

## 2019-06-15 NOTE — PLAN OF CARE
Problem: Patient Centered Care  Goal: Patient preferences are identified and integrated in the patient's plan of care  Description  Interventions:  - What would you like us to know as we care for you?  I work at the hospital  - Provide timely, complete, a ADULT  Goal: Absence of fever/infection during anticipated neutropenic period  Description  INTERVENTIONS  - Monitor WBC  - Administer growth factors as ordered  - Implement neutropenic guidelines  Outcome: Progressing     Problem: DISCHARGE PLANNING  Goal routine/schedule  - Consider collaborating with pharmacy to review patient's medication profile  Outcome: Progressing     Pt resting in bed. Ambulates in room with assist, complained of feeling dizzy during evening.  Surgical sites c/d/I. JOHN to bulb suction

## 2019-06-15 NOTE — PLAN OF CARE
Problem: Patient Centered Care  Goal: Patient preferences are identified and integrated in the patient's plan of care  Description  Interventions:  - What would you like us to know as we care for you?  I work at the hospital  - Provide timely, complete, a ADULT  Goal: Absence of fever/infection during anticipated neutropenic period  Description  INTERVENTIONS  - Monitor WBC  - Administer growth factors as ordered  - Implement neutropenic guidelines  Outcome: Progressing     Problem: DISCHARGE PLANNING  Goal routine/schedule  - Consider collaborating with pharmacy to review patient's medication profile  Outcome: Progressing   Pt A&Ox3, VSS, afebrile, c/o nausea, medicated as per MD order, no emesis, JOHN d/c'd today.  Surgical incisional sites CDI, tolerating ful

## 2019-06-15 NOTE — PROGRESS NOTES
[facility] - [facility]    Hematology/Oncology   Progress Note    Leilashabbir Luna Patient Status:  Inpatient    1965 MRN [id number]   Location [facility] 4W/SW/SE Attending Demetri Porras MD   Hosp Day # 4 PCP Salome Kaur MD

## 2019-06-15 NOTE — PROGRESS NOTES
[facility]    [facility] Note    Michael Poster Patient Status:  Inpatient    1965 MRN [id number]   Location [facility] 4W/SW/SE Attending Power Curiel MD   Hosp Day # 5 PCP Di Chavarria MD       Subjective:   Cindy Joseph for input(s): PT, INR in the last 168 hours. No results for input(s): PGLU in the last 168 hours. Creatinine   Date Value Ref Range Status   06/15/2019 0.81 0.70 - 1.30 mg/dL Final   06/12/2019 0.88 0.70 - 1.30 mg/dL Final   06/11/2019 0.99 0.70 - 1. 150.0 - 450.0 10(3)uL Final   07/22/2016 260.0 140.0 - 415.0 10 Final       Scheduled Meds:   • acetaminophen  1,000 mg Intravenous Q6H   • piperacillin-tazobactam  3.375 g Intravenous Q8H   • docusate sodium  100 mg Oral BID   • Heparin Sodium (Porcine)

## 2019-06-15 NOTE — PROGRESS NOTES
[facility]    Progress Note    Seth Oliveros Patient Status:  Inpatient    1965 MRN [id number]   Location [facility] 4W/SW/SE Attending Barbara Adams MD   Hosp Day # 5 PCP Sybil Ocasio MD            Subjective: CA 8.0* 8.2* 8.1*   ALB 2.8*  --   --     143 141   K 4.5 4.2 3.5    113* 107   CO2 27.0 26.0 29.0   ALKPHO 138*  --   --    AST 22  --   --    ALT 40  --   --    BILT 0.9  --   --    TP 6.2*  --   --                          Assessment and P

## 2019-06-16 NOTE — PROGRESS NOTES
[place] [facility] - [facility]    Progress Note    Yen Cabral Patient Status:  Inpatient    1965 MRN [id number]   Location [facility] 4W/SW/SE Attending Lauren Roberts MD   Hosp Day # 6 PCP Roland Ho MD            Subjective: 8. 0* 8.2* 8.1*   ALB 2.8*  --   --     143 141   K 4.5 4.2 3.5    113* 107   CO2 27.0 26.0 29.0   ALKPHO 138*  --   --    AST 22  --   --    ALT 40  --   --    BILT 0.9  --   --    TP 6.2*  --   --                          Assessment and Plan:

## 2019-06-16 NOTE — PLAN OF CARE
Problem: Patient Centered Care  Goal: Patient preferences are identified and integrated in the patient's plan of care  Description  Interventions:  - What would you like us to know as we care for you?  I work at the hospital  - Provide timely, complete, a ADULT  Goal: Absence of fever/infection during anticipated neutropenic period  Description  INTERVENTIONS  - Monitor WBC  - Administer growth factors as ordered  - Implement neutropenic guidelines  Outcome: Progressing     Problem: DISCHARGE PLANNING  Goal routine/schedule  - Consider collaborating with pharmacy to review patient's medication profile  Outcome: Progressing   Pt A&Ox3, VSS, afebrile, pt c/o intermittent lower abdominal cramping, states loose green b/m's x 3, poor appetite, no n/v, c/o pain 1-2

## 2019-06-16 NOTE — PLAN OF CARE
Problem: Patient Centered Care  Goal: Patient preferences are identified and integrated in the patient's plan of care  Description  Interventions:  - What would you like us to know as we care for you?  I work at the hospital  - Provide timely, complete, a ADULT  Goal: Absence of fever/infection during anticipated neutropenic period  Description  INTERVENTIONS  - Monitor WBC  - Administer growth factors as ordered  - Implement neutropenic guidelines  Outcome: Progressing     Problem: DISCHARGE PLANNING  Goal routine/schedule  - Consider collaborating with pharmacy to review patient's medication profile  Outcome: Progressing   Patient refused heparin and IV tylenol. Had ultram prn for pain, up to bathroom, walking, had loose bm noted.

## 2019-06-17 NOTE — PLAN OF CARE
Problem: Patient Centered Care  Goal: Patient preferences are identified and integrated in the patient's plan of care  Description  Interventions:  - What would you like us to know as we care for you?  I work at the hospital  - Provide timely, complete, a ADULT  Goal: Absence of fever/infection during anticipated neutropenic period  Description  INTERVENTIONS  - Monitor WBC  - Administer growth factors as ordered  - Implement neutropenic guidelines  Outcome: Progressing     Problem: DISCHARGE PLANNING  Goal routine/schedule  - Consider collaborating with pharmacy to review patient's medication profile  Outcome: Progressing     Pt A/O x4. VSS. Refusing heparin subq & IV Tylenol. Ambulating independently; frequently. IV fluids & IV abx. Tolerating diet.  Pt fuentes

## 2019-06-17 NOTE — PAYOR COMM NOTE
--------------  CONTINUED STAY REVIEW    Payor: Deepa FERNANDEZ [facility]  Subscriber #:  [id number]  Authorization Number: [id number]    Admit date: 6/10/19  Admit time: 2231    Admitting Physician: Keegan Candelario MD  Attending Physician:  Keegan Candelario Net 500 ml      Po 700  BM x 4     GENERAL:in no apparent distress  EYES: PERRLA, EOMI, sclera anicteric, conjunctiva normal  RESPIRATORY: clear to auscultation  CARDIOVASCULAR: S1, S2   ABDOMEN: normal active BS+, mildly distended, minimal tenderness no Repeat CT scan suggested appendicitis and patient underwent appendectomy on 6/11/2019.     Passed still passing flatus. Had liquid stool 2 days ago.   Appetite somewhat better yesterday.     Objective:   Blood pressure 155/86, pulse 70, temperature 97.6 °F Katelyn Roman CRNA; Monique Irby CRNA  Anesthesiologist [facility]: Johnie Diaz MD     Findings: perforated appendicitis

## 2019-06-17 NOTE — PLAN OF CARE
Pt did have one episode with pain and nausea. Relieved with zofran and dilaudid. Pt encouraged to walk, as he felt like he needed to pass gas. May anticipate discharge today.   Problem: Patient Centered Care  Goal: Patient preferences are identified and unsuccessful or patient reports new pain  - Anticipate increased pain with activity and pre-medicate as appropriate  Outcome: Progressing     Problem: RISK FOR INFECTION - ADULT  Goal: Absence of fever/infection during anticipated neutropenic period  Descr and tolerated  - Evaluate effectiveness of GI medications  - Encourage mobilization and activity  - Obtain nutritional consult as needed  - Establish a toileting routine/schedule  - Consider collaborating with pharmacy to review patient's medication profil

## 2019-06-17 NOTE — PROGRESS NOTES
[facility]    Progress Note    Queeniealka Jimenezsharifa Patient Status:  Inpatient    1965 MRN [id number]   Location [facility] 4W/SW/SE Attending Gina Frazier MD   Hosp Day # 7 PCP Efrem Shrestha MD       Subjective:   Deborah Bob --   --        No results for input(s): LIP, LOLI in the last 168 hours. No results for input(s): TROP, CK in the last 168 hours. No results for input(s): PT, INR in the last 168 hours. No results for input(s): PGLU in the last 168 hours.     Harriet Franklin 150.0 - 450.0 10(3)uL Final   06/13/2019 204.0 150.0 - 450.0 10(3)uL Final   06/13/2019 199.0 150.0 - 450.0 10(3)uL Final   06/12/2019 171.0 150.0 - 450.0 10(3)uL Final   07/22/2016 260.0 140.0 - 415.0 10 Final       Scheduled Meds:   • acetaminophen  1,00

## 2019-06-17 NOTE — PROGRESS NOTES
[facility] HOSP - [facility]    Hematology/Oncology   Progress Note    Wykristina Starr Patient Status:  Inpatient    1965 MRN [id number]   Location [facility] 4W/SW/SE Attending Zena Osullivan MD   Hosp Day # 7 PCP Avtar De La Fuente MD

## 2019-06-17 NOTE — DIETARY NOTE
ADULT NUTRITION INITIAL ASSESSMENT    Pt is at moderate nutrition risk. Pt does not meet malnutrition criteria.       RECOMMENDATIONS TO MD:  See Nutrition Intervention     NUTRITION DIAGNOSIS/PROBLEM:  Inadequate oral intake related to decreased ability Wt Readings from Last 20 Encounters:  06/11/19 : 69.1 kg (152 lb 6.4 oz)  05/16/19 : 68 kg (150 lb)  03/19/19 : 63.5 kg (140 lb)  03/18/19 : 63.5 kg (139 lb 15.9 oz)  03/17/19 : 63.5 kg (140 lb)  03/08/19 : 63.5 kg (140 lb)  01/10/19 : 64.9 kg (143 lb) Monitor: adequacy of PO intake and tolerance of PO intake.     - Anthropometric Measurement:      Monitor: wt and wt change     - Nutrition Goals:      regain wt as able, PO greater than 75% of meals, promote healing and improved GI status      DIETITIAN FO

## 2019-06-17 NOTE — PROGRESS NOTES
[facility]    Progress Note    Beryl Lebron Patient Status:  Inpatient    1965 MRN [id number]   Location [facility] 4W/SW/SE Attending Sheree Price MD   Hosp Day # 7 PCP Debbie Quintero MD            Subjective: 0. 88 0.81 0.85   GFRAA 102 115 119 117   GFRNAA 88 99 103 101   CA 8.0* 8.2* 8.1* 8.7   ALB 2.8*  --   --   --     143 141 141   K 4.5 4.2 3.5 3.9    113* 107 105   CO2 27.0 26.0 29.0 33.0*   ALKPHO 138*  --   --   --    AST 22  --   --   --

## 2019-06-18 NOTE — PLAN OF CARE
Problem: Patient Centered Care  Goal: Patient preferences are identified and integrated in the patient's plan of care  Description  Interventions:  - What would you like us to know as we care for you?  I work at the hospital  - Provide timely, complete, a Discharge     Problem: RISK FOR INFECTION - ADULT  Goal: Absence of fever/infection during anticipated neutropenic period  Description  INTERVENTIONS  - Monitor WBC  - Administer growth factors as ordered  - Implement neutropenic guidelines  Outcome: Adequ activity  - Obtain nutritional consult as needed  - Establish a toileting routine/schedule  - Consider collaborating with pharmacy to review patient's medication profile  Outcome: Adequate for Discharge    Pt A/O x4. VSS.  Refusing heparin subq & IV Tylenol

## 2019-06-18 NOTE — PROGRESS NOTES
[facility]    Progress Note    Doreen Albany Patient Status:  Inpatient    1965 MRN [id number]   Location [facility] 4W/SW/SE Attending Bruna Augustin MD   Hosp Day # 8 PCP Rosi Owusu MD       Subjective:   Deepali rKuger No results for input(s): LIP, LOLI in the last 168 hours. No results for input(s): TROP, CK in the last 168 hours. No results for input(s): PT, INR in the last 168 hours. No results for input(s): PGLU in the last 168 hours.     Creatinine   Da 450.0 10(3)uL Final   06/13/2019 204.0 150.0 - 450.0 10(3)uL Final   06/13/2019 199.0 150.0 - 450.0 10(3)uL Final   06/12/2019 171.0 150.0 - 450.0 10(3)uL Final   07/22/2016 260.0 140.0 - 415.0 10 Final       Scheduled Meds:   • acetaminophen  1,000 mg Int

## 2019-06-18 NOTE — PLAN OF CARE
Patient tolerating diet, denies nausea. IV antibiotics as ordered. Continues to refuse Ofirmev and Heparin. Pain continues. Tramadol x1 given, dilaudid given x1 with zofran prophylacticly.    Problem: Patient Centered Care  Goal: Patient preferences are responsible for managing their own health  - Refer to Case Management Department for coordinating discharge planning if the patient needs post-hospital services based on physician/LIP order or complex needs related to functional status, cognitive ability o pre-medicate as appropriate  Outcome: Not Progressing

## 2019-06-18 NOTE — PROGRESS NOTES
[facility]    Progress Note    [facility] Patient Status:  Inpatient    1965 MRN [id number]   Location [facility] 4W/SW/SE Attending Dennis Elaine MD   Hosp Day # 8 PCP Marcelina Morocho MD            Subjective: 119 117   GFRNAA 99 103 101   CA 8.2* 8.1* 8.7    141 141   K 4.2 3.5 3.9   * 107 105   CO2 26.0 29.0 33.0*                         Assessment and Plan:     Acute appendicitis  Postop ileus  Soft, formed BM today  Tolerating diet w/o nausea or

## 2019-06-18 NOTE — OPERATIVE REPORT
[facility]    PATIENT'S NAME: Letitia Mari   ATTENDING PHYSICIAN: Lia Carmona. Rishi Yanez MD   OPERATING PHYSICIAN: Jolynn Scott.  Lilly Armendariz MD   PATIENT ACCOUNT#:   [id number]    LOCATION:  [address] [place] [address] #:   [id number]       DATE OF BI fibrin around the appendix. This was mobilized off the lateral wall. Slowly mobilized the mesoappendix, divided the base of the appendix and junction where the cecum was good. Then, I cross stapled and the appendix was removed.     We went up to the uppe

## 2019-06-19 NOTE — PAYOR COMM NOTE
--------------  DISCHARGE REVIEW    Payor: Kristina WILSON  Subscriber #:  [id number]  Authorization Number: [id number]    Admit date: 6/10/19  Admit time:  2231  Discharge Date: 6/18/2019  2:30 PM     Admitting Physician: MD Jo Finn

## 2019-07-15 NOTE — ANESTHESIA PROCEDURE NOTES
Airway  Date/Time: 7/15/2019 9:15 AM  Urgency: emergent    Airway not difficult    General Information and Staff    Patient location during procedure: OR  Anesthesiologist: Shivani Moncada MD  Resident/CRNA: Kassy Hernandez CRNA  Performed: MARY LESLIE

## 2019-07-15 NOTE — H&P
[facility]    PATIENT'S NAME: Hoda Shultz   ATTENDING PHYSICIAN: Cheri Becker.  Trevor Carter MD   PATIENT ACCOUNT#:   [id number]    LOCATION:  [address] [place] [address] RECORD #:   [id number]       YOB: 1965  ADMISSION DATE:       07/ well-developed, well-nourished, oriental male in minimal physical distress at this time. VITAL SIGNS:  Pulse 92, respirations 16, blood pressure 137/69. HEENT:  Head atraumatic. Eyes:  EOMI. PERRL.   Mouth:  Tongue and uvula midline without erythema or

## 2019-07-15 NOTE — CONSULTS
[facility] HOSP - [facility]    Consult Note    Date:  7/15/2019  Date of Admission:  7/15/2019      Chief Complaint:   Luba Starr is a(n) 48year old male with anaphylactic reaction.     HPI:   The patient has a history of hepatocellular carcinoma Take 2 tablets (50 mg total) by mouth every 12 (twelve) hours. Take 13 hours, 1 hour prior to procedure. Prochlorperazine Maleate (COMPAZINE) 10 mg tablet Take 10 mg by mouth every 6 (six) hours as needed for Nausea.    Ondansetron HCl (ZOFRAN) 4 mg table dye infusion at the time of his infusion. Recommendations:  1. Full ventilator support  2. Methylprednisolone  3. Benadryl  4. Taper epinephrine to off  5. We will consider weaning trial once the chemical paralysis is completely resolved  6.   Pepci

## 2019-07-15 NOTE — ANESTHESIA PREPROCEDURE EVALUATION
Anesthesia PreOp Note    HPI:     Eduarda Haynes is a 48year old male who presents for preoperative consultation requested by: * No surgeons listed *    Date of Surgery: 7/15/2019    * No procedures listed *  Indication: * No pre-op diagnosis entere daily  Disp:  Rfl:  7/14/2019 at Unknown time       Current Facility-Administered Medications Ordered in Epic:  0.9% NaCl infusion  Intravenous Continuous Jc Abraham MD     sodium chloride 0.9% infusion         EPINEPHrine HCl (ADRENALIN) 4,000 mc Edisylate (COMPAZINE) injection 5 mg 5 mg Intravenous Once PRN Bryce Anderson MD     haloperidol lactate (HALDOL) 5 MG/ML injection 0.25 mg 0.25 mg Intravenous Once PRN Bryce Anderson MD     Naloxone HCl [facility]) 0.4 MG/ML injection 80 mcg 80 mcg Int Service: Not Asked        Blood Transfusions: Not Asked        Caffeine Concern: Yes          Coffee        Occupational Exposure: Not Asked        Hobby Hazards: Not Asked        Sleep Concern: Not Asked        Stress Concern: Not Asked        Weight Conc intubated before my arrival.  Radiology procedure in progress. Patient is intubated and stable. Plan to keep patient intubated and transfer directly to ICU.   Management in ICU will be by intensivist.        I have informed Roz Way and/or reyna

## 2019-07-15 NOTE — ANESTHESIA POSTPROCEDURE EVALUATION
Patient: Melissa Fat    Procedure Summary     Date:  07/15/19 Room / Location:  [facility]    Anesthesia Start:  9046 Anesthesia Stop:  5904    Procedure:  IR Y-90 TREATMENT Diagnosis:       Hepatocellular carcinoma (H

## 2019-07-15 NOTE — PROCEDURES
[facility]  Procedure Note    Berylmj Oliveros Patient Status:  Outpatient in a Bed    1965 MRN [id number]   Location [facility] Attending Mishel Crowe MD   Hosp Day # 0 PCP Sybil Ocasio MD

## 2019-07-15 NOTE — RESPIRATORY THERAPY NOTE
Patient received from [address] [place] [address] LAB team. patient intubated. Patient placed on ventilator with the following vent settings: A/C 14, , Peep 5, Fio2 45%. Alarms set. Patient still sedated, unable to follow commands. Exhaled CO2 monitor was placed.  Nurse at

## 2019-07-15 NOTE — PLAN OF CARE
Extubated now on RN denies pain, plan to walk in hallway   Problem: Safety Risk - Non-Violent Restraints  Goal: Patient will remain free from self-harm  Description  INTERVENTIONS:  - Apply the least restrictive restraint to prevent harm  - Notify patient Monitor for signs/symptoms of CO2 retention  Outcome: Progressing

## 2019-07-15 NOTE — H&P
[facility] Patient Status:  Outpatient in a Bed    1965 MRN [id number]   Location [facility] Attending Kristine Peña MD   Hosp Day # 0 PCP Corey Reilly input(s): GLU, BUN, CREATSERUM, GFRAA, GFRNAA, CA, NA, K, CL, CO2 in the last 168 hours. Assessment/Plan:   Impression: Multifocal hepatocellular carcinoma    Recommendations: Y-90 radioembolization.     Jasvir Evans MD  7/15/2019  8:17 AM

## 2019-07-15 NOTE — RESPIRATORY THERAPY NOTE
PT was placed on PS/CPAP trial, 5/5 at 1211. PT awake and following commands. PT pulled good tidal volumes, vitals stable, RN present in room.     1320 PT extubated to 3L NC.

## 2019-07-16 NOTE — ADDENDUM NOTE
Addendum  created 07/16/19 0905 by Dylan Olivo CRNA    Intraprocedure Event edited, Intraprocedure Staff edited

## 2019-07-16 NOTE — PLAN OF CARE
Pt alert and ambulating in room, agrees with discharge plan to home   Problem: Patient Centered Care  Goal: Patient preferences are identified and integrated in the patient's plan of care  Description  Interventions:  - What would you like us to know as we injury  Description  INTERVENTIONS:  - Assess pt frequently for physical needs  - Identify cognitive and physical deficits and behaviors that affect risk of falls.   - [place] fall precautions as indicated by assessment.  - Educate pt/family on patient sa

## 2019-07-16 NOTE — PROGRESS NOTES
[facility]    Progress Note      Assessment and Plan:   1. Anaphylactic reaction– the clinical syndrome is entirely resolved. Leukemoid reaction.       Recommendations:  1.     Home today with medicine directed toward allergic response

## 2019-07-16 NOTE — PROGRESS NOTES
[facility]    Progress Note    Clarence Gaucher Patient Status:  Inpatient    1965 MRN [id number]   Location [facility] 2W/SW Attending Edna Castellano MD   Hosp Day # 1 PCP Luis Parson MD       Subjective:   Mary Bourgeois 06/12/2019 0.88 0.70 - 1.30 mg/dL Final   06/11/2019 0.99 0.70 - 1.30 mg/dL Final     GFR    Date Value Ref Range Status   07/22/2016 60.0  Final     GFR, -American   Date Value Ref Range Status   07/16/2019 91 >=60 Final   07/15/2 • lactated ringers     • sodium chloride 100 mL/hr at 07/15/19 2220           Ct Chest ([id number])    Result Date: 7/15/2019  CONCLUSION:  1. Interval increase in the size of a presumed dominant metastatic lesion in the right upper lobe as discussed.   St

## 2019-07-17 NOTE — PAYOR COMM NOTE
--------------  ADMISSION REVIEW     Payor: Carolyne FERNANDEZ [facility]  Subscriber #:  [id number]  Authorization Number: [id number]    Admit date: 7/15/19  Admit time: 36       Admitting Physician: Latasha Ibrahim MD  Attending Physician:  Bridget Marcum known.    IMMUNIZATIONS:      FAMILY HISTORY:  Father  at 54, accidental death. Mother is alive at age 76 with hypothyroidism. SOCIAL HISTORY:  Patient is a nonsmoker. No recreational drug and does not drink alcohol.     REVIEW OF SYSTEMS:  Patient 53:35:28  [facility] [id number]  [facility]    cc: Flora Flores MD    Electronically signed by Rickie Bonilla MD on 7/16/2019  7:25 AM

## 2019-07-17 NOTE — PAYOR COMM NOTE
--------------  DISCHARGE REVIEW    Payor: Mounika Grider [facility]  Subscriber #:  [id number]  Authorization Number: [id number]    Admit date: 7/15/19  Admit time:  0807  Discharge Date: 7/16/2019 10:58 AM     Admitting Physician: Meggan Medina MD  [address] [address] [facility] [address]  Pager: [phone]            Electronically signed by Taryn Rodriguez MD at 7/16/2019 11:47 AM

## 2019-07-17 NOTE — DISCHARGE SUMMARY
[facility]    PATIENT'S NAME: Lopez Mansfield   ATTENDING PHYSICIAN: Ruby Arnold MD   PATIENT ACCOUNT#:   [id number]    LOCATION:  [address] [place] [address] #:   [id number]       YOB: 1965  ADMISSION DATE:       0 06/11/2019 by Dr. Lucila Prajapati. The patient was found to have a ruptured appendicitis. As a result, patient required more extended intravenous antibiotic therapy postoperatively, but when he was stable he was then discharged home.     CONDITION ON DISCHARGE:  Go

## 2019-08-26 NOTE — TELEPHONE ENCOUNTER
Basilio Almonte saw Dr Nii Mcgee last Friday. He was going to call Dr Annamaria Stevens to discuss my case. Did Dr Annamaria Stevens speak with Dr Nii Mcgee? What is the next step in the plan? Will I be starting Lenvatinib? Pt can be reached at [phone].  Pt does not have an appt sched

## 2019-08-27 NOTE — PROGRESS NOTES
Cancer Center Progress Note    Patient Name: Griffin Hernandes   YOB: 1965   Medical Record Number: [id number]   Attending Physician: Sameer Garcia M.D.      Chief Complaint:  [address] hx of hepatitis A    History of Present Illness:  Cancer  48 0  Past Medical History:  Past Medical History:   Diagnosis Date   • Cancer [facility])    • Personal history of antineoplastic chemotherapy    • Prediabetes        Past Surgical History:  Past Surgical History:   Procedure Laterality Date   • APPENDECTOMY  06/1 Service: Not Asked        Blood Transfusions: Not Asked        Caffeine Concern: Yes          Coffee        Occupational Exposure: Not Asked        Hobby Hazards: Not Asked        Sleep Concern: Not Asked        Stress Concern: Not Asked        Weight Conc Regular with palpable distal pulses   Abdomen: Soft, non tender. Extremities: No edema. Neurological: 5/5 motor x4. Laboratory:  No results for input(s): WBC, HGB, PLT, NE, NEUT in the last 504 hours.       Lab Results   Component Value Date    GL

## 2019-08-30 NOTE — TELEPHONE ENCOUNTER
Isadora Clark called to check the status of his CT scan since he is supposed to be starting a new med once he had the CT scheduled.  Please advise

## 2019-08-30 NOTE — TELEPHONE ENCOUNTER
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

## 2019-09-03 NOTE — TELEPHONE ENCOUNTER
Updated Dr Dejan Dutton of pt's increased jaundice and fatigue. Instructed pt that Dr Dejan Dutton would like to see him in clinic for evaluation, w CMP prior.   Appt scheduled for tomorrow at 11am.

## 2019-09-04 PROBLEM — E80.6 HYPERBILIRUBINEMIA: Status: ACTIVE | Noted: 2019-01-01

## 2019-09-04 PROBLEM — R74.01 TRANSAMINITIS: Status: ACTIVE | Noted: 2019-01-01

## 2019-09-04 NOTE — H&P (VIEW-ONLY)
[address]   Gastroenterology Consultation Note     [address]  Patient Status:    Inpatient  Date of Admission:         9/4/2019, Hospital day #0  Attending:   Mary Islas MD  PCP:     José Miguel Collins MD    Reason for Con (Porcine) 5000 UNIT/ML injection 5,000 Units, 5,000 Units, Subcutaneous, 2 times per day  •  ondansetron HCl (ZOFRAN) injection 4 mg, 4 mg, Intravenous, Q6H PRN  •  Metoclopramide HCl (REGLAN) injection 10 mg, 10 mg, Intravenous, Q8H PRN    Review of Syste 10:50     Approved by (CST): Graceann Duane, MD on 9/03/2019 at 11:02          Mri Abdomen&mrcp W/3d ([id number])    Result Date: 9/4/2019  CONCLUSION:  1.  There is infiltrative tumor extending into the extrahepatic biliary tree, resulting in extensiv in his liver despite treatment as well. Will attempt ERCP with stent placement which we discussed though given the burden of disease in the liver, liver enzymes may not completely normalize and/or may require percutaneous drainage.     Recommend:  -ERCP wit

## 2019-09-04 NOTE — PROGRESS NOTES
Cancer Center Progress Note    Patient Name: Roz Way   YOB: 1965   Medical Record Number: [id number]   Attending Physician: Ray Rajan M.D.      Chief Complaint:  [address] hx of hepatitis A    History of Present Illness:  Cancer  48 any focal neurological deficits    Performance Status:  ECOG 0  Past Medical History:  Past Medical History:   Diagnosis Date   • [facility] [address] [address]    • Personal history of antineoplastic chemotherapy    • Prediabetes        Past Surgical History:  Past Surgica activity: Not on file    Other Topics      Concerns:         Service: Not Asked        Blood Transfusions: Not Asked        Caffeine Concern: Yes          Coffee        Occupational Exposure: Not Asked        Hobby Hazards: Not Asked        Sleep C Chest: Symmetric expansion, nonlabored breathing  Cardiovascular: Regular with palpable distal pulses   Abdomen: Soft, non tender. Extremities: No edema. Neurological: 5/5 motor x4.         Laboratory:  No results for input(s): WBC, HGB, PLT, NE, NEUT

## 2019-09-04 NOTE — H&P
[facility]    PATIENT'S NAME: Leandradontae Josephmukesh   ATTENDING PHYSICIAN: Kristy Muhammad.  David Franco MD   PATIENT ACCOUNT#:   [id number]    LOCATION:  [address] [place] [address] #:   [id number]       YOB: 1965  ADMISSION DATE:       09/04 no recreational drugs and does not drink alcohol. 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 REVIEW OF SYSTEMS:  Patient denies frequent headaches or any recent change in vision. No dysphagia, hemoptysis, hematemesis, melena, hematochezia.   No

## 2019-09-04 NOTE — CONSULTS
Inpatient oncology consultation    Chief Complaint:  [address] obstructive jaundice    History of Present Illness:  Cancer  71-year-old male with a remote history of hepatitis A being evaluated by Oncology for a liver mass and elevated AFP with imaging consiste neurological deficits    Performance Status:  ECOG 0  Past Medical History:  Past Medical History:   Diagnosis Date   • Cancer ([facility] [address]    • Personal history of antineoplastic chemotherapy    • Prediabetes        Past Surgical History:  Past Surgical History: file    Other Topics      Concerns:         Service: Not Asked        Blood Transfusions: Not Asked        Caffeine Concern: Yes          Coffee        Occupational Exposure: Not Asked        Hobby Hazards: Not Asked        Sleep Concern: Not Asked (H) 09/04/2019    TP 7.6 09/04/2019    ALB 2.8 (L) 09/04/2019    GLOBULT 3.4 07/22/2016    GLOBULIN 4.9 (H) 09/03/2019     09/04/2019    K 3.8 09/04/2019     09/04/2019    CO2 28.0 09/04/2019           Cancer Staging  No matching staging inform

## 2019-09-04 NOTE — ED PROVIDER NOTES
Patient Seen in: [facility] AND [facility] Emergency Department    History   Patient presents with:  Jaundice (gastrointestinal, hematologic)    Stated Complaint: jaundice abd pain weakness    HPI    42-year-old male with history of metastatic hepatocellular car reviewed. All other systems reviewed and negative except as noted above.     Physical Exam     ED Triage Vitals   BP 09/04/19 1159 140/83   Pulse 09/04/19 1159 86   Resp 09/04/19 1329 20   Temp 09/04/19 1159 98.9 °F (37.2 °C)   Temp src 09/04/19 1159 O normal limits   HEPATIC FUNCTION PANEL (7) - Abnormal; Notable for the following components:    AST 80 (*)     ALT 90 (*)     Alkaline Phosphatase 525 (*)     Bilirubin, Total 13.8 (*)     Bilirubin, Direct 11.0 (*)     Albumin 2.8 (*)     All other compon 1:28 pm    Follow-up:  No follow-up provider specified.       Medications Prescribed:  Current Discharge Medication List              Present on Admission  Date Reviewed: 9/4/2019          ICD-10-CM Noted POA    Hyperbilirubinemia E80.6 9/4/2019 Unknown

## 2019-09-04 NOTE — CONSULTS
[facility] [address]   Gastroenterology Consultation Note     Dalton Griffin  Patient Status:    Inpatient  Date of Admission:         9/4/2019, Hospital day #0  Attending:   Wood Rodriguez MD  PCP:     Tyree Medina MD    Reason for Con (Porcine) 5000 UNIT/ML injection 5,000 Units, 5,000 Units, Subcutaneous, 2 times per day  •  ondansetron HCl (ZOFRAN) injection 4 mg, 4 mg, Intravenous, Q6H PRN  •  Metoclopramide HCl (REGLAN) injection 10 mg, 10 mg, Intravenous, Q8H PRN    Review of Syste 10:50     Approved by (CST): Gulshan Mancia MD on 9/03/2019 at 11:02          Mri Abdomen&mrcp W/3d (BGR=[id number])    Result Date: 9/4/2019  CONCLUSION:  1.  There is infiltrative tumor extending into the extrahepatic biliary tree, resulting in extensiv in his liver despite treatment as well. Will attempt ERCP with stent placement which we discussed though given the burden of disease in the liver, liver enzymes may not completely normalize and/or may require percutaneous drainage.     Recommend:  -ERCP wit

## 2019-09-05 NOTE — PROGRESS NOTES
Patient intubated for ERCP planned for today/this morning.     We discussed yesterday his contrast allergy and that contrast allergies with ERCP where there is biliary contrast injected and not IV contrast injection are very rare and there is limited data a

## 2019-09-05 NOTE — ANESTHESIA PROCEDURE NOTES
Airway  Date/Time: 9/5/2019 9:44 AM  Urgency: elective    Airway not difficult    General Information and Staff    Patient location during procedure:  Other (Note) (GI)  Resident/CRNA: Yary Albrecht CRNA  Performed: CRNA     Indications and Patient Co

## 2019-09-05 NOTE — ANESTHESIA POSTPROCEDURE EVALUATION
Patient: Jeanice Saint    Procedure Summary     Date:  09/05/19 Room / Location:  [address] [place] [address] ENDOSCOPY 01 / [address] [place] [address] ENDOSCOPY    Anesthesia Start:  7864 Anesthesia Stop:  1944    Procedure:  ENDOSCOPIC RETROGRADE CHOLANGIOPANCREATOGRAPHY (ERCP) (N/A ) Alma Jaramillo

## 2019-09-05 NOTE — PAYOR COMM NOTE
--------------  ADMISSION REVIEW     Payor: [facility]  Subscriber #:  [id number]  Authorization Number: [id number]    Admit date: 9/4/19  Admit time: 1414       Admitting Physician: Laurence Grider MD  Attending Physician:  Laurence Grider MD Performed by Tammy Pablo MD at [address] [place] [address] MAIN OR                    Social History    Tobacco Use      Smoking status: Never Smoker      Smokeless tobacco: Never Used    Alcohol use: No    Drug use:  No             Review of Systems   Constitutional: Negative Neurological: He is alert and oriented to person, place, and time. Skin: Skin is warm, dry and intact. Jaundice   Psychiatric: He has a normal mood and affect. His speech is normal and behavior is normal.   Nursing note and vitals reviewed. CBC unremarkable. Hepatic panel consistent with transaminitis and elevated bilirubin. Dr. Mirella Garcia with GI consulted and requests MRCP which is ordered. Dr. Radha Marsh also on consult.       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 Patient has been followed closely by Dr. Casey Hunter.   Most recently, patient became jaundic REVIEW OF SYSTEMS:  Patient denies frequent headaches or any recent change in vision. No dysphagia, hemoptysis, hematemesis, melena, hematochezia. No chest pain, palpitations, or shortness of breath. No dysuria. Patient has noted orange urine.   No one- Cancer  37-year-old male with a remote history of hepatitis A being evaluated by Oncology for a liver mass and elevated AFP with imaging consistent with hepatocellular carcinoma.  The patient had abnormal liver tests on 03/12/2018, that showed his alkaline Diagnosis Date   • Cancer [facility])     • Personal history of antineoplastic chemotherapy     • Prediabetes           Past Surgical History:        Past Surgical History:   Procedure Laterality Date   • APPENDECTOMY   06/10/2019   • COLONOSCOPY N/A 12/29/2017 Blood Transfusions: Not Asked        Caffeine Concern: Yes          Coffee        Occupational Exposure: Not Asked        Hobby Hazards: Not Asked        Sleep Concern: Not Asked        Stress Concern: Not Asked        Weight Concern: Not Asked   ALB 2.8 (L) 09/04/2019     GLOBULT 3.4 07/22/2016     GLOBULIN 4.9 (H) 09/03/2019      09/04/2019     K 3.8 09/04/2019      09/04/2019     CO2 28.0 09/04/2019               Cancer Staging  No matching staging information was found for the pat Julio César Torres is a 48year old man with history of hepatocellular carcinoma diagnosed in early 2018 undergoing systemic and Y-90 therapy with progression of disease noted in July of this year with pulmonary lesions representing metastases who was ad all 10 systems were reviewed and were negative except as noted in the HPI     Physical Exam:    Blood pressure 129/85, pulse 68, temperature 98.3 °F (36.8 °C), temperature source Oral, resp.  rate 20, height 5' 7\" (1.702 m), weight 141 lb 6.4 oz (64.1 kg), CONCLUSION:  1. There is infiltrative tumor extending into the extrahepatic biliary tree, resulting in extensive intrahepatic and proximal extrahepatic biliary dilatation.   2. Probable exophytic additional compression of the proximal common bile duct with Progressive disease with worsening jaundice, bilirubin and liver enzymes.  I reviewed the images with radiology of his MRI/MRCP imaging today notes biliary obstruction at the level of the common hepatic duct possibly related to infiltrative tumor and/or pos Yunier Romo is a(n) 48year old male with known metastatic hepatocellular carcinoma who presents with obstructive jaundice. MRI scan suggest extension from his liver to the extrahepatic ducts.   GI consultation for ERCP with stent placement.     O 07/22/2016 60.0   Final            GFR, -American   Date Value Ref Range Status   09/04/2019 117 >=60 Final   09/03/2019 97 >=60 Final   08/16/2019 105 >=60 Final   07/16/2019 91 >=60 Final   07/15/2019 93 >=60 Final   06/17/2019 117 >=60 Final     CONCLUSION:  1. Noncalcified 1.2 cm and 0.8 cm right upper lobe lung nodules, both of which appear increased in size as compared with July, 2019 as well as May, 2019 and are new since September, 2018. Findings remain suspicious for pulmonary metastases.  2. 9/5/2019 1001 Given 4 mg Intravenous Mike Galindo CRNA      fentaNYL citrate (SUBLIMAZE) 0.05 MG/ML injection     Date Action Dose Route User    9/5/2019 7702 Given 50 mcg Intravenous Mike Galindo CRNA      glycopyrrolate (ROBINUL) 0.2 MG/ML

## 2019-09-05 NOTE — ANESTHESIA PREPROCEDURE EVALUATION
Anesthesia PreOp Note    HPI:     Leila Luna is a 48year old male who presents for preoperative consultation requested by: Valencia Hutson MD    Date of Surgery: 6/10/2019 - 6/11/2019    Procedure(s):  LAPAROSCOPIC APPENDECTOMY  Indication: Gary Palomo ondansetron HCl (ZOFRAN) injection  Intravenous PRN Kenisha Lehman, CRNA 4 mg at 09/05/19 1001   Midazolam HCl (VERSED) 2 MG/2ML injection  Intravenous PRN Kenisha Lehman CRNA 2 mg at 09/05/19 0934   fentaNYL citrate (SUBLIMAZE) 0.05 MG/ML injecti Food insecurity:        Worry: Not on file        Inability: Not on file      Transportation needs:        Medical: Not on file        Non-medical: Not on file    Tobacco Use      Smoking status: Never Smoker      Smokeless tobacco: Never Used    [employer] CO2 28.0 09/04/2019    BUN 12 09/04/2019    CREATSERUM 0.84 09/04/2019     (H) 09/04/2019    CA 8.8 09/04/2019     Lab Results   Component Value Date    INR 1.04 09/04/2019       Vital Signs: There is no height or weight on file to calculate BMI.

## 2019-09-05 NOTE — INTERVAL H&P NOTE
Pre-op Diagnosis: obstructive jaundice    The above referenced H&P was reviewed by Vibha Esposito MD on 9/5/2019, the patient was examined and no significant changes have occurred in the patient's condition since the H&P was performed.   I discussed wit

## 2019-09-05 NOTE — PLAN OF CARE
Problem: Patient/Family Goals  Goal: Patient/Family Long Term Goal  Description  Patient's Long Term Goal: go home    Interventions:  - ERCP in am  - IV fluids  - daily labs  - See additional Care Plan goals for specific interventions   Outcome: Progress

## 2019-09-05 NOTE — PROGRESS NOTES
[facility]    Progress Note    Dalton Griffin Patient Status:  Inpatient    1965 MRN [id number]   Location [facility] Attending Wood Rodriguez MD   Hosp Day # 1 PCP Tyree Medina MD       Subjective:   Janelle Lombardo Final   07/16/2019 1.08 0.70 - 1.30 mg/dL Final   07/15/2019 1.06 0.70 - 1.30 mg/dL Final   06/17/2019 0.85 0.70 - 1.30 mg/dL Final     GFR    Date Value Ref Range Status   07/22/2016 60.0  Final     GFR, -American   Date Value Ref R 9/3/2019  CONCLUSION:  1. Noncalcified 1.2 cm and 0.8 cm right upper lobe lung nodules, both of which appear increased in size as compared with July, 2019 as well as May, 2019 and are new since September, 2018.  Findings remain suspicious for pulmonary meta

## 2019-09-05 NOTE — PLAN OF CARE
Problem: Patient Centered Care  Goal: Patient preferences are identified and integrated in the patient's plan of care  Description  Interventions:  - What would you like us to know as we care for you?  I am an RN in radiology   - Provide timely, complete, physical needs  - Identify cognitive and physical deficits and behaviors that affect risk of falls.   - [place] fall precautions as indicated by assessment.  - Educate pt/family on patient safety including physical limitations  - Instruct pt to call for a

## 2019-09-05 NOTE — PROGRESS NOTES
[facility]    Hematology/Oncology   Progress Note    Dalton Griffin Patient Status:  Inpatient    1965 MRN [id number]   Location [facility] 4W/SW/SE Attending Wood Rodriguez MD   Hosp Day # 1 PCP Tyree Medina MD Although suboptimally assessed on this noncontrast examination, there is loss of the flow void of the inferior mesenteric vein several sequences. This is most likely related to technical factors, with tumor thrombus considered less likely.   6. There may be

## 2019-09-06 NOTE — PROGRESS NOTES
[facility]    Progress Note    Jaguar Waldrop Patient Status:  Inpatient    1965 MRN [id number]   Location [address] [facility] Attending Deonna Fatima MD   Hosp Day # 2 PCP Janine Crocker MD       Subjective:   David George 1.30 mg/dL Final   08/16/2019 0.96 0.70 - 1.30 mg/dL Final   07/16/2019 1.08 0.70 - 1.30 mg/dL Final   07/15/2019 1.06 0.70 - 1.30 mg/dL Final   06/17/2019 0.85 0.70 - 1.30 mg/dL Final     GFR    Date Value Ref Range Status   07/22/2016 60. Oral Once   • Finasteride  1.5 mg Oral Daily       Continuous Infusions:   • lactated ringers 100 mL/hr at 09/05/19 1130           Mri Abdomen&mrcp W/3d (VRA=[id number])    Result Date: 9/4/2019  CONCLUSION:  1.  There is infiltrative tumor extending into

## 2019-09-06 NOTE — ANESTHESIA PROCEDURE NOTES
Airway  Date/Time: 9/6/2019 10:38 AM  Urgency: elective    Airway not difficult    General Information and Staff    Patient location during procedure: OR  Anesthesiologist: Corrinne Muta, DO  Resident/CRNA: Mc Wheeler CRNA  Performed: CRNA     In

## 2019-09-06 NOTE — PLAN OF CARE
Discussed plan of care for day, including all given medications and their indications. S/P ERCP with stent placement today by Dr. Griselda So. IV fluids maintained and patient tolerating clear liquid diet well. No complaint of pain at present.   Plan to tr Problem: RISK FOR INFECTION - ADULT  Goal: Absence of fever/infection during anticipated neutropenic period  Description  INTERVENTIONS  - Monitor WBC  - Administer growth factors as ordered  - Implement neutropenic guidelines  Outcome: Progressing     P

## 2019-09-06 NOTE — ANESTHESIA POSTPROCEDURE EVALUATION
Patient: Queenie Kaminski    Procedure Summary     Date:  09/06/19 Room / Location:  [address] [place] [address] ENDOSCOPY 01 / [address] [place] [address] ENDOSCOPY    Anesthesia Start:  3214 Anesthesia Stop:      Procedure:  ENDOSCOPIC RETROGRADE CHOLANGIOPANCREATOGRAPHY (ERCP) (N/A ) Diagnosis:

## 2019-09-06 NOTE — PROGRESS NOTES
[facility] HOSP - [facility]    Hematology/Oncology   Progress Note    Queenie Kaminski Patient Status:  Inpatient    1965 MRN [id number]   Location [facility] 4W/SW/SE Attending Gina Frazier MD   Hosp Day # 2 PCP Efrem Shrestha MD likely. 6. There may be cholelithiasis or other intraluminal debris within the gallbladder lumen. Mild gallbladder wall thickening is observed with trace pericholecystic fluid accumulation.   7. Partially visualized right middle lobe nodule, potentially re

## 2019-09-06 NOTE — OPERATIVE REPORT
Endoscopic retrograde cholangio-pancreatography (ERCP) report    Kofi Lupillo     1965 Age 48year old   PCP Praneeth Cochran MD Endoscopist Dave Puentes MD     Date of procedure: 19    Procedure: ERCP w/ sphincterotomy and stent appeared normal. The bile duct was selectively cannulated with a dreamtome loaded with an 0.035 in jagwire. The wire was advanced into the left intrahepatic biliary system. Contrast was injected confirming bile duct cannulation.  A biliary sphincterotomy wa

## 2019-09-06 NOTE — PAYOR COMM NOTE
--------------  CONTINUED STAY REVIEW-----REQUESTING ADDITIONAL DAY 9/6      Shelby WILSON  Subscriber #:  [id number]  Authorization Number: [id number]    Admit date: 9/4/19  Admit time: 9167    Admitting Physician: Alexandra Lyon MD  Att GFRNAA [id number] [phone]   CA 9.6 8.8   ALB 3.2* 2.8*    137   K 3.9 3.8   CL 99 103   CO2 31.0 28.0   ALKPHO 614* 525*   * 80*   * 90*   BILT 16.2* 13.8*   TP 8.1 7.6         No results for input(s): LIP, LOLI in the last 168 hours.     No results 06/14/2019 13.5 13.0 - 17.5 g/dL Final   07/22/2016 13.5 12.5 - 17.0 g/dL Final            PLT   Date Value Ref Range Status   09/04/2019 282.0 150.0 - 450.0 10(3)uL Final   07/16/2019 175.0 150.0 - 450.0 10(3)uL Final   07/15/2019 189.0 150.0 - 450.0 10(3 CONCLUSION:  1. There is infiltrative tumor extending into the extrahepatic biliary tree, resulting in extensive intrahepatic and proximal extrahepatic biliary dilatation.   2. Probable exophytic additional compression of the proximal common bile duct with lactated ringers infusion     Date Action Dose Route User    9/6/2019 1246 New Bag (none) [facility]venous [facility]      lactated ringers infusion     Date Action Dose Route User    9/6/2019 1030 New Bag (none) Intravenous Genaro Sneed., CRNA

## 2019-09-06 NOTE — H&P
History & Physical Examination    Patient Name: Jeanice Saint  MRN: [id number]  [facility]: [id number]  YOB: 1965    Diagnosis: jaundice, elevated LFTs, abnormal MRCP, malignant biliary obstruction      Medications Prior to Admission:  Cate Chi Intravenous Q10 Min PRN   morphINE sulfate (PF) 4 MG/ML injection 4 mg 4 mg Intravenous Q10 Min PRN   morphINE sulfate (PF) 10 MG/ML injection 6 mg 6 mg Intravenous Q10 Min PRN   Atropine Sulfate 0.1 MG/ML injection 0.5 mg 0.5 mg Intravenous PRN   ondanset procedure with the patient/family. They understand and agree to proceed with plan of care. I have reviewed the History and Physical done within the last 30 days. Any changes noted above.     Es Whittaker MD  [facility], [facility] - Gastroenterology  9/6

## 2019-09-06 NOTE — DIETARY NOTE
ADULT NUTRITION INITIAL ASSESSMENT    Pt is at moderate nutrition risk. Pt does not meet malnutrition criteria.       RECOMMENDATIONS TO MD:  See Nutrition Intervention     NUTRITION DIAGNOSIS/PROBLEM:  Altered GI function related to alteration in gastro 6.4 oz)  05/16/19 : 68 kg (150 lb)  03/19/19 : 63.5 kg (140 lb)  03/18/19 : 63.5 kg (139 lb 15.9 oz)  03/17/19 : 63.5 kg (140 lb)  03/08/19 : 63.5 kg (140 lb)      GASTROINTESTINAL: jaundiced, ERCP today with stent placement.       FOOD/NUTRITION RELATED HI

## 2019-09-06 NOTE — ANESTHESIA PREPROCEDURE EVALUATION
Anesthesia PreOp Note    HPI:     Jay Julien is a 48year old male who presents for preoperative consultation requested by: Antonio Schneider MD    Date of Surgery: 9/4/2019 - 9/6/2019    Procedure(s):  ENDOSCOPIC RETROGRADE CHOLANGIOPANCREATOG as needed for Itching (hives). ) Disp: 2 tablet Rfl: 2    Prochlorperazine Maleate (COMPAZINE) 10 mg tablet Take 10 mg by mouth every 6 (six) hours as needed for Nausea.  Disp:  Rfl:  9/1/2019   Ondansetron HCl (ZOFRAN) 4 mg tablet Take 1 tablet (4 mg tota Not on file        Inability: Not on file      Transportation needs:        Medical: Not on file        Non-medical: Not on file    Tobacco Use      Smoking status: Never Smoker      Smokeless tobacco: Never Used    Substance and Sexual Activity      Alcoh CREATSERUM 0.84 09/04/2019     (H) 09/04/2019    CA 8.8 09/04/2019     Lab Results   Component Value Date    INR 1.04 09/04/2019       Vital Signs: Body mass index is 22.08 kg/m². height is 1.702 m (5' 7\") and weight is 64 kg (141 lb).  His oral questions were answered to the best of my ability. The patient desires the anesthetic management as planned. Kelli CALDERON AT [facility]  9/6/2019 9:54 AM

## 2019-09-06 NOTE — PLAN OF CARE
Problem: Patient Centered Care  Goal: Patient preferences are identified and integrated in the patient's plan of care  Description  Interventions:  - What would you like us to know as we care for you?  I am an RN in radiology   - Provide timely, complete, physical needs  - Identify cognitive and physical deficits and behaviors that affect risk of falls.   - [place] fall precautions as indicated by assessment.  - Educate pt/family on patient safety including physical limitations  - Instruct pt to call for a

## 2019-09-07 NOTE — PROGRESS NOTES
[facility] [address]  GI SERVICE PROGRESS NOTE    Kofi Wesley Patient Status:  Inpatient    1965 MRN [id number]   Location [facility] 4W/SW/SE Attending Dioni Busby MD   Hosp Day # 3 PCP Praneeth Cochran MD       Subjective: ALB 3.2* 2.8* 2.3*    137 142   K 3.9 3.8 4.4   CL 99 103 107   CO2 31.0 28.0 31.0   ALKPHO 614* 525* 394*   * 80* 51*   * 90* 61   BILT 16.2* 13.8* 7.3*   TP 8.1 7.6 6.5       Recent Labs   Lab 09/07/19  0539   LIP 26,295*       No r g on admission 3 days ago. Total bilirubin 16.2 –> 7.3  AST 51 ALT 61  Lipase 26,295    BUN 12 –> 25 today    Suggest:  · Suspect post ERCP pancreatitis. I explained the extreme importance of IV fluid support for pancreatitis to Mr. Nadya Osuna.   He agrees t

## 2019-09-07 NOTE — PLAN OF CARE
Problem: Patient Centered Care  Goal: Patient preferences are identified and integrated in the patient's plan of care  Description  Interventions:  - What would you like us to know as we care for you?  I am an RN in radiology   - Provide timely, complete, physical needs  - Identify cognitive and physical deficits and behaviors that affect risk of falls.   - [place] fall precautions as indicated by assessment.  - Educate pt/family on patient safety including physical limitations  - Instruct pt to call for a

## 2019-09-07 NOTE — PLAN OF CARE
Problem: Patient Centered Care  Goal: Patient preferences are identified and integrated in the patient's plan of care  Description  Interventions:  - What would you like us to know as we care for you?  I am an RN in radiology   - Provide timely, complete, physical needs  - Identify cognitive and physical deficits and behaviors that affect risk of falls.   - [place] fall precautions as indicated by assessment.  - Educate pt/family on patient safety including physical limitations  - Instruct pt to call for a at this time. Up independently. Safety measures in place, call light within reach, will continue to monitor.

## 2019-09-08 NOTE — PLAN OF CARE
Problem: Patient Centered Care  Goal: Patient preferences are identified and integrated in the patient's plan of care  Description  Interventions:  - What would you like us to know as we care for you?  I am an RN in radiology   - Provide timely, complete, physical needs  - Identify cognitive and physical deficits and behaviors that affect risk of falls.   - [place] fall precautions as indicated by assessment.  - Educate pt/family on patient safety including physical limitations  - Instruct pt to call for a

## 2019-09-08 NOTE — PROGRESS NOTES
[facility]    Progress Note    Seth Oliveros Patient Status:  Inpatient    1965 MRN [id number]   Location [facility] 4W/SW/SE Attending Barbara Adams MD   Hosp Day # 4 PCP Sybil Ocasio MD       Subjective:   Heather Morris and 9-12 intact. Motor patient is moving his arms and legs with good intention and apparently good strength. There appears to be no focal neurologic deficits.            Results:     Recent Labs   Lab 09/04/19  1220 09/07/19  0953   RBC 4.41 4.38   HGB 13

## 2019-09-08 NOTE — PROGRESS NOTES
[facility] [address]  GI SERVICE PROGRESS NOTE    Daltonkayy Griffin Patient Status:  Inpatient    1965 MRN [id number]   Location [facility] 4W/SW/SE Attending Wood Rodriguez MD   Hosp Day # 4 PCP Tyree Medina MD       Subjective: No results for input(s): ESRML, ESRPF, CRP, MG, PHOS, TROP, B12 in the last 168 hours. No results for input(s): URINE, CULTI, BLDSMR in the last 168 hours.               Assessment and Plan:     48year old man with history of hepatocellular carcin

## 2019-09-09 NOTE — PLAN OF CARE
Problem: Patient Centered Care  Goal: Patient preferences are identified and integrated in the patient's plan of care  Description  Interventions:  - What would you like us to know as we care for you?  I am an RN in radiology   - Provide timely, complete, physical needs  - Identify cognitive and physical deficits and behaviors that affect risk of falls.   - [place] fall precautions as indicated by assessment.  - Educate pt/family on patient safety including physical limitations  - Instruct pt to call for a

## 2019-09-09 NOTE — PROGRESS NOTES
[facility] HOSP - [facility]    Progress Note    Clarence Gaucher Patient Status:  Inpatient    1965 MRN [id number]   Location [address] [facility] Attending Edna Castellano MD   Hosp Day # 5 PCP Luis Parson MD       Subjective:   Vashti George Recent Labs   Lab 09/08/19  0512   LIP 9,163*       No results for input(s): TROP, CK in the last 168 hours. Recent Labs   Lab 09/04/19  1220   INR 1.04       No results for input(s): PGLU in the last 168 hours.     Creatinine   Date Value Ref Rang 07/15/2019 189.0 150.0 - 450.0 10(3)uL Final   06/17/2019 265.0 150.0 - 450.0 10(3)uL Final   06/15/2019 210.0 150.0 - 450.0 10(3)uL Final   07/22/2016 260.0 140.0 - 415.0 10 Final       Scheduled Meds:   • Heparin Sodium (Porcine)  5,000 Units Subcutane

## 2019-09-09 NOTE — PLAN OF CARE
Discussed plan of care for day, including all given medications and their indications. Continuing to have abdominal pain, distention, and significant nausea. IV Zofran and PO Tramadol continued as needed.   Lipase level elevated but decreasing -- Abdomen balance  Outcome: Not Progressing     Problem: RISK FOR INFECTION - ADULT  Goal: Absence of fever/infection during anticipated neutropenic period  Description  INTERVENTIONS  - Monitor WBC  - Administer growth factors as ordered  - Implement neutropenic gu

## 2019-09-09 NOTE — PLAN OF CARE
Problem: Patient Centered Care  Goal: Patient preferences are identified and integrated in the patient's plan of care  Description  Interventions:  - What would you like us to know as we care for you?  I am an RN in radiology   - Provide timely, complete, physical needs  - Identify cognitive and physical deficits and behaviors that affect risk of falls.   - [place] fall precautions as indicated by assessment.  - Educate pt/family on patient safety including physical limitations  - Instruct pt to call for a

## 2019-09-09 NOTE — PROGRESS NOTES
[facility]  GI SERVICE PROGRESS NOTE    Nataliia Yuli Patient Status:  Inpatient    1965 MRN [id number]   Location [facility] 4W/SW/SE Attending Laurence Grider MD   Hosp Day # 5 PCP Lucero Guerrero MD       Subjective: 31.0   ALKPHO 614* 525* 394*   * 80* 51*   * 90* 61   BILT 16.2* 13.8* 7.3*   TP 8.1 7.6 6.5       Recent Labs   Lab 09/07/19  0539 09/08/19  0512   LIP 26,295* 9,163*       No results for input(s): ESRML, ESRPF, CRP, MG, PHOS, TROP, B12 in t x-ray series  · Sips of clear liquids today only. Not ready to advance diet.       Yashira Landeros MD

## 2019-09-09 NOTE — PROGRESS NOTES
[facility]    Hematology/Oncology   Progress Note    Sreenity Tapia Patient Status:  Inpatient    1965 MRN [id number]   Location [facility] 4W/SW/SE Attending Rhett Roberts MD   Hosp Day # 5 PCP Filomena Wright MD metastatic hepatocellular carcinoma with pulmonary metastasis. Treatment as outlined above. He has pulmonary metastasis  –Recently started lenvatinib in the last week  –Now hospitalized with obstructive jaundice. ERCP stenting completed.   Now with post E

## 2019-09-10 NOTE — PLAN OF CARE
No acute changes overnight. Nausea and abdominal cramping continued. PRN Reglan/Zofran/Tramadol administered accordingly. Reported passing gas. Ambulating frequently. Vitals WNL. NPO status with sips of clears continued per MD orders.        Problem: GASTRO discharge learning needs (meds, wound care, etc)  - Arrange for interpreters to assist at discharge as needed  - Consider post-discharge preferences of patient/family/discharge partner  - Complete POLST form as appropriate  - Assess patient's ability to be

## 2019-09-10 NOTE — PROGRESS NOTES
[facility] HOSP - [facility]    Hematology/Oncology   Progress Note    Alanis Burns Patient Status:  Inpatient    1965 MRN [id number]   Location [facility] 4W/SW/SE Attending Nina Healy MD   Hosp Day # 6 PCP Tracey Bray MD

## 2019-09-10 NOTE — PROGRESS NOTES
[facility]    Progress Note    Danilo Power Patient Status:  Inpatient    1965 MRN [id number]   Location [address] [facility] Attending Christian Lang MD   Hosp Day # 6 PCP Rosaura Tran MD       Subjective:   Hiral English 84 106  --    CA 8.8 8.9 8.4*  --    ALB 2.8* 2.3* 2.0*  --     142 137  --    K 3.8 4.4 3.8 3.5    107 102  --    CO2 28.0 31.0 30.0  --    ALKPHO 525* 394* 290*  --    AST 80* 51* 39*  --    ALT 90* 61 37  --    BILT 13.8* 7.3* 8.7*  --    TP 13.0 - 17.5 g/dL Final   06/17/2019 14.6 13.0 - 17.5 g/dL Final   07/22/2016 13.5 12.5 - 17.0 g/dL Final     PLT   Date Value Ref Range Status   09/09/2019 292.0 150.0 - 450.0 10(3)uL Final   09/07/2019 348.0 150.0 - 450.0 10(3)uL Final   09/04/2019 282.0

## 2019-09-10 NOTE — PAYOR COMM NOTE
--------------  CONTINUED STAY REVIEW-----REQUESTING ADDITIONAL DAY 9/10      Payor: Trisha FERNANDEZ EPO  Subscriber #:  [id number]  Authorization Number: [id number]    Admit date: 9/4/19  Admit time: 1414    Admitting Physician: Mary Islas MD  At Results:            Recent Labs   Lab 09/04/19  1220 09/07/19  0953 09/09/19  0950   RBC 4.41 4.38 3.97*   HGB 13.3 13.2 12.1*   HCT 39.5 40.0 35.8*   MCV 89.6 91.3 90.2   MCH 30.2 30.1 30.5   MCHC 33.7 33.0 33.8   RDW 16.5* 17.2* 16.8*   NEPRELIM 8.24* 12 08/16/2019 91 >=60 Final   07/16/2019 79 >=60 Final            WBC   Date Value Ref Range Status   09/09/2019 18.3 (H) 4.0 - 11.0 x10(3) uL Final   09/07/2019 13.8 (H) 4.0 - 11.0 x10(3) uL Final   09/04/2019 10.6 4.0 - 11.0 x10(3) uL Final   07/16/2019 27. Date Action Dose Route User    9/9/2019 2246 New Bag (none) Intravenous Ameena Benites RN      Metoclopramide HCl (REGLAN) injection 10 mg     Date Action Dose Route User    9/9/2019 2246 Given 10 mg Intravenous Srini Flores RN      [employer]

## 2019-09-10 NOTE — PROGRESS NOTES
[facility] [address]  GI SERVICE PROGRESS NOTE    Isiah Campos Patient Status:  Inpatient    1965 MRN [id number]   Location [facility] 4W/SW/SE Attending Clarissa Hernandez MD   Hosp Day # 6 PCP Corey Reilly MD       Subjective: AST 80* 51* 39*  --    ALT 90* 61 37  --    BILT 13.8* 7.3* 8.7*  --    TP 7.6 6.5 6.0*  --        Recent Labs   Lab 09/09/19  0950 09/10/19  0505   LIP 3,689* 2,783*       No results for input(s): ESRML, ESRPF, CRP, MG, PHOS, TROP, B12 in the last 168 data:  WBC 13,800 on 9/8 labs  Hemoglobin 13.3 g on admission 3 days ago. Total bilirubin 16.2 –> 7.3  AST 51 ALT 61  Lipase 26,295 –> 9160 –> 3689 –> 2783     9/9/2019:  Worsening pain possible distention concerning for ileus (pancreatitis, pain meds?)  O

## 2019-09-11 NOTE — PLAN OF CARE
Problem: Patient Centered Care  Goal: Patient preferences are identified and integrated in the patient's plan of care  Description  Interventions:  - What would you like us to know as we care for you?  I am an RN in radiology   - Provide timely, complete, physical needs  - Identify cognitive and physical deficits and behaviors that affect risk of falls.   - [place] fall precautions as indicated by assessment.  - Educate pt/family on patient safety including physical limitations  - Instruct pt to call for a

## 2019-09-11 NOTE — PLAN OF CARE
Problem: Patient Centered Care  Goal: Patient preferences are identified and integrated in the patient's plan of care  Description  Interventions:  - What would you like us to know as we care for you?  I am an RN in radiology   - Provide timely, complete, Instruct pt to call for assistance with activity based on assessment  - Modify environment to reduce risk of injury  - Provide assistive devices as appropriate  - Consider OT/PT consult to assist with strengthening/mobility  - Encourage toileting schedule

## 2019-09-11 NOTE — PROGRESS NOTES
[facility]    Hematology/Oncology   Progress Note    Beryl Lebron Patient Status:  Inpatient    1965 MRN [id number]   Location [facility] 4W/SW/SE Attending Sheree Price MD   Hosp Day # 7 PCP Debbie Quintero MD intracardiac blood pool raises the possibility of underlying anemia. Correlate with hematologic parameters. 6. Pulmonary nodules, concerning for metastatic disease.   7. Moderate bilateral pleural effusions with associated compressive atelectasis, with or Medications reviewed. Assessment and Plan:  65-year-old male with a remote history of hepatitis A following with medical oncology for metastatic hepatocellular carcinoma with pulmonary metastasis. Treatment as outlined above.   He has pulmonary

## 2019-09-11 NOTE — DIETARY NOTE
Brief Nutrition Note:     Initial Nutrition Assessment completed 9/6--refer to for details. Chart reviewed today, pt with pancreatitis post ERCP on 9/6. Pt has been clear liquid/NPO since 9/6.  Called GI MD to discuss initiation of Nutrition Support (E

## 2019-09-11 NOTE — PROGRESS NOTES
[facility] [address]     Gastroenterology Progress Note    Eduarda Haynes Patient Status:  Inpatient    1965 MRN [id number]   Location [facility] 4W/SW/SE Attending Kylee Burrows MD   Hosp Day # 7 PCP Cecilio Larry MD Date: 9/9/2019  CONCLUSION:  1. Large amount of feces throughout the ascending colon. Mild air-filled dilatation of colon and distal small bowel. Findings are more likely related to ileus without well-defined bowel obstruction. Correlate clinically.   Co

## 2019-09-11 NOTE — PLAN OF CARE
Problem: Patient Centered Care  Goal: Patient preferences are identified and integrated in the patient's plan of care  Description  Interventions:  - What would you like us to know as we care for you?  I am an RN in radiology   - Provide timely, complete, physical needs  - Identify cognitive and physical deficits and behaviors that affect risk of falls.   - [place] fall precautions as indicated by assessment.  - Educate pt/family on patient safety including physical limitations  - Instruct pt to call for a

## 2019-09-11 NOTE — PROGRESS NOTES
[facility]    Progress Note    Marveen Poster Patient Status:  Inpatient    1965 MRN [id number]   Location [address] [facility] Attending Power Curiel MD   Hosp Day # 7 PCP Di Chavarria MD       Subjective:   Ana Mayfield 106  --   --    CA 8.8 8.9 8.4*  --   --    ALB 2.8* 2.3* 2.0*  --   --     142 137  --   --    K 3.8 4.4 3.8 3.5 3.8    107 102  --   --    CO2 28.0 31.0 30.0  --   --    ALKPHO 525* 394* 290*  --   --    AST 80* 51* 39*  --   --    ALT 90* 61 07/16/2019 12.6 (L) 13.0 - 17.5 g/dL Final   07/15/2019 15.1 13.0 - 17.5 g/dL Final   06/17/2019 14.6 13.0 - 17.5 g/dL Final   07/22/2016 13.5 12.5 - 17.0 g/dL Final     PLT   Date Value Ref Range Status   09/09/2019 292.0 150.0 - 450.0 10(3)uL Final   0 10:06     Approved by (CST): Tati Sandoval MD on 9/11/2019 at 10:24                  Roel Garcia MD  9/11/2019

## 2019-09-11 NOTE — PAYOR COMM NOTE
--------------  CONTINUED STAY REVIEW-----REQUESTING ADDITIONAL DAY 9/11      Payor: Chioma Koo [facility] EPO  Subscriber #:  [id number]  Authorization Number: [id number]    Admit date: 9/4/19  Admit time: 3439    Admitting Physician: Gina Frazier MD  At Lab 09/04/19  1220 09/07/19  0953 09/09/19  0950   RBC 4.41 4.38 3.97*   HGB 13.3 13.2 12.1*   HCT 39.5 40.0 35.8*   MCV 89.6 91.3 90.2   MCH 30.2 30.1 30.5   MCHC 33.7 33.0 33.8   RDW 16.5* 17.2* 16.8*   NEPRELIM 8.24* 12.49* 16.51*   WBC 10.6 13.8* 18.3* 09/04/2019 101 >=60 Final   09/03/2019 84 >=60 Final   08/16/2019 91 >=60 Final   07/16/2019 79 >=60 Final            WBC   Date Value Ref Range Status   09/09/2019 18.3 (H) 4.0 - 11.0 x10(3) uL Final   09/07/2019 13.8 (H) 4.0 - 11.0 x10(3) uL Final   09/0 CONCLUSION:  1. Findings suspicious for acute interstitial edematous pancreatitis, particularly the pancreatic tail, with peripancreatic fluid extending into the left paracolic gutter.  Assessment for potential pancreatic necrosis cannot be performed on thi potassium chloride 40 mEq in sodium chloride 0.9% 250 mL IVPB     Date Action Dose Route User    9/10/2019 1900 New Bag 40 mEq Intravenous Herbert Perdue RN      traMADol HCl Jesus Velez) tab 50 mg     Date Action Dose Route User    9/10/2019 2008 Given 48

## 2019-09-12 NOTE — PROGRESS NOTES
[facility]    Progress Note    Wykristina Starr Patient Status:  Inpatient    1965 MRN [id number]   Location [address] [facility] Attending Zena Osullivan MD   Hosp Day # 8 PCP Avtar De La Fuente MD       Subjective:   Natty Ghosh 4.4 3.8 3.5 3.9  3.8    102  --  102   CO2 31.0 30.0  --  26.0   ALKPHO 394* 290*  --  305*   AST 51* 39*  --  65*   ALT 61 37  --  42   BILT 7.3* 8.7*  --  8.9*   TP 6.5 6.0*  --  5.4*       Recent Labs   Lab 09/11/19  0506   LIP 2,626*       No res 07/22/2016 13.5 12.5 - 17.0 g/dL Final     PLT   Date Value Ref Range Status   09/09/2019 292.0 150.0 - 450.0 10(3)uL Final   09/07/2019 348.0 150.0 - 450.0 10(3)uL Final   09/04/2019 282.0 150.0 - 450.0 10(3)uL Final   07/16/2019 175.0 150.0 - 450.0 10( Doppler(yzc=[id number])    Result Date: 9/11/2019  CONCLUSION:  1. Cirrhotic appearing liver. Multiple hepatic metastases/masses. Gallbladder not identified. Somewhat boggy appearance of the pancreas. Can't exclude pancreatitis.   Upper limits of gunnar

## 2019-09-12 NOTE — PAYOR COMM NOTE
--------------  CONTINUED STAY REVIEW-----REQUESTING ADDITIONAL DAY 9/12      Payor: Archie FERNANDEZ EPO  Subscriber #:  [id number]  Authorization Number: [id number]    Admit date: 9/4/19  Admit time: 1414    Admitting Physician: Kalee Lopez MD  At Recent Labs   Lab 09/07/19  0953 09/09/19  0950   RBC 4.38 3.97*   HGB 13.2 12.1*   HCT 40.0 35.8*   MCV 91.3 90.2   MCH 30.1 30.5   MCHC 33.0 33.8   RDW 17.2* 16.8*   NEPRELIM 12.49* 16.51*   WBC 13.8* 18.3*   .0 292.0                Recent Labs Date Value Ref Range Status   09/09/2019 18.3 (H) 4.0 - 11.0 x10(3) uL Final   09/07/2019 13.8 (H) 4.0 - 11.0 x10(3)  Final   09/04/2019 10.6 4.0 - 11.0 x10(3)  Final   07/16/2019 27.2 (H) 4.0 - 11.0 x10(3)  Final   07/15/2019 10.1 4.0 - 11.0 x10(3) CONCLUSION:  1. Findings suspicious for acute interstitial edematous pancreatitis, particularly the pancreatic tail, with peripancreatic fluid extending into the left paracolic gutter.  Assessment for potential pancreatic necrosis cannot be performed on thi CONCLUSION:  1. Cirrhotic appearing liver. Multiple hepatic metastases/masses. Gallbladder not identified. Somewhat boggy appearance of the pancreas. Can't exclude pancreatitis. Upper limits of normal of the main pancreatic duct measuring 3 mm.   Maria Elena Mahoney

## 2019-09-12 NOTE — PROGRESS NOTES
[facility] HOSP - [facility]    Hematology/Oncology   Progress Note    Wykristina Starr Patient Status:  Inpatient    1965 MRN [id number]   Location [facility] 4W/SW/SE Attending Zena Osullivan MD   Hosp Day # 8 PCP Avtar De La Fuente MD appendectomy. 11. Lesser incidental findings as above.     Dictated by (CST): Sadia Francis MD on 9/11/2019 at 10:06     Approved by (CST): Sadia Francis MD on 9/11/2019 at 10:24          Us Abdomen Complete With Doppler(cpt=[id number])    Result Date

## 2019-09-12 NOTE — PLAN OF CARE
A&Ox4. Skin and scerla jaundice. Pt wanted to increase IV fluids slowly d/t tender IV site, tolerating [email or web address] well, IV intact. Dilaudid for abd pain with relief and ice pack for HA with relief. NPO, sips of water.  SCDs declined, RN offered education o Evaluate effectiveness of ordered antiemetic medications  - Provide nonpharmacologic comfort measures as appropriate  - Advance diet as tolerated, if ordered  - Obtain nutritional consult as needed  - Evaluate fluid balance  Outcome: Progressing     Proble functional status, cognitive ability or social support system  Outcome: Progressing

## 2019-09-12 NOTE — PROGRESS NOTES
[facility] [address]  GI SERVICE PROGRESS NOTE    Yunier Romo Patient Status:  Inpatient    1965 MRN [id number]   Location [facility] 4W/SW/SE Attending Keri Lott MD   Hosp Day # 8 PCP Michelet Kaplan MD       Subjective: 6.5 6.0*  --  5.4*       Recent Labs   Lab 09/10/19  0505 09/11/19  0506   LIP 2,783* 2,626*       No results for input(s): ESRML, ESRPF, CRP, MG, PHOS, TROP, B12 in the last 168 hours.     No results for input(s): URINE, CULTI, BLDSMR in the last 168 hours Normal appearing flow in the hepatic veins. Normal flow within the descending abdominal aorta, celiac axis, SMA and left gastric artery. Patent IVC. Normal appearing flow in the splenic vein.   Normal kidneys with a nonobstructing stone lower pole of the ultrasound of portal and mesenteric veins  9/12/2019: Better. Still tachycardic. Wants to advance back to clears. Suggest:  · Post ERCP pancreatitis? Slow downward trend of the serum lipase somewhat atypical for post ERCP pancreatitis.    · Continue L

## 2019-09-12 NOTE — PLAN OF CARE
Problem: Patient Centered Care  Goal: Patient preferences are identified and integrated in the patient's plan of care  Description  Interventions:  - What would you like us to know as we care for you?  I am an RN in radiology   - Provide timely, complete, physical needs  - Identify cognitive and physical deficits and behaviors that affect risk of falls.   - [place] fall precautions as indicated by assessment.  - Educate pt/family on patient safety including physical limitations  - Instruct pt to call for a

## 2019-09-13 NOTE — PLAN OF CARE
A&Ox4. Pt up ad solomon. Pt on clears, tolerating well. Dilaudid for abd pain and cramping with relief. Pt has continued BLE swelling, pulses 3+. SCDs in place. Pt skin and scerla jaundice. Pt voiding and passing flatus.  Pt on 2L O2 via [place]. [email or web address] infusin INFECTION - ADULT  Goal: Absence of fever/infection during anticipated neutropenic period  Description  INTERVENTIONS  - Monitor WBC  - Administer growth factors as ordered  - Implement neutropenic guidelines  Outcome: Progressing     Problem: SAFETY ADULT

## 2019-09-13 NOTE — DIETARY NOTE
ADULT NUTRITION REASSESSMENT     Pt is at moderate nutrition risk. Pt does not meet malnutrition criteria.       RECOMMENDATIONS TO MD:  See Nutrition Intervention  If poor tolerance to diet progression, pt would benefit for initiation of nutrition suppo lasix. Use admit wt in calculations (141#)   BMI: Body mass index is 22.08  kg/m².   BMI CLASSIFICATION: 19-24.9 kg/m2 - WNL  IBW: 148 lbs        95% IBW  Usual Body Wt: 150-152 lbs, 155-158 a few yrs ago       93% UBW    WEIGHT HISTORY:  Patient Weight(s) protein per kg Current wt)    MONITOR AND EVALUATE/NUTRITION GOALS:  - Food and Nutrient Intake:      Monitor: adequacy of PO intake, tolerance of PO intake, adequacy of supplement intake and tolerance of supplement intake.     - Anthropometric Measurement:

## 2019-09-13 NOTE — PROGRESS NOTES
[facility] HOSP - [facility]    Hematology/Oncology   Progress Note    Isiah Campos Patient Status:  Inpatient    1965 MRN [id number]   Location [facility] 4W/SW/SE Attending Clarissa Hernandez MD   Hosp Day # 9 PCP Corey Reilly MD suboptimal on this noncontrast study. 5. Low-density appearance of the intracardiac blood pool raises the possibility of underlying anemia. Correlate with hematologic parameters. 6. Pulmonary nodules, concerning for metastatic disease.   7. Moderate bilat lenvatinib at discharge. Appreciate gi input.      Forest Pugh MD

## 2019-09-13 NOTE — PAYOR COMM NOTE
--------------  CONTINUED STAY REVIEW----REQUESTING ADDITIONAL DAY 9/13      Payor: Carolyne WILSON  Subscriber #:  [id number]  Authorization Number: [id number]    Admit date: 9/4/19  Admit time: 1414    Admitting Physician: Piedad Eaton MD  Att Per Dr. Essie Cummings     Pedal edema  We will decrease IV rate and give Lasix one-time dose today.           Results:            Recent Labs   Lab 09/07/19  0953 09/09/19  0950 09/13/19  0506   RBC 4.38 3.97* 3.59*   HGB 13.2 12.1* 11.0*   HCT 40.0 35.8* 32. Date Value Ref Range Status   09/13/2019 125 >=60 Final   09/11/2019 113 >=60 Final   09/09/2019 106 >=60 Final   09/07/2019 84 >=60 Final   09/04/2019 101 >=60 Final   09/03/2019 84 >=60 Final            WBC   Date Value Ref Range Status   09/13/2019 26. 3 CONCLUSION:  1. Findings suspicious for acute interstitial edematous pancreatitis, particularly the pancreatic tail, with peripancreatic fluid extending into the left paracolic gutter.  Assessment for potential pancreatic necrosis cannot be performed on thi CONCLUSION:  1. Cirrhotic appearing liver. Multiple hepatic metastases/masses. Gallbladder not identified. Somewhat boggy appearance of the pancreas. Can't exclude pancreatitis. Upper limits of normal of the main pancreatic duct measuring 3 mm.   Jeraline Corolla

## 2019-09-13 NOTE — PLAN OF CARE
Patient is alert and oriented. Generalized jaundice throughout. No other skin issues. Heparin and SCDs for DVT prophylaxis. Patient is a medium fall risk but is oriented to own ability and has a steady gait while ambulating in room.  Bed is locked and in lo nutritional consult as needed  - Evaluate fluid balance  Outcome: Progressing     Problem: RISK FOR INFECTION - ADULT  Goal: Absence of fever/infection during anticipated neutropenic period  Description  INTERVENTIONS  - Monitor WBC  - Administer growth fa

## 2019-09-13 NOTE — PROGRESS NOTES
[facility] [address]     Gastroenterology Progress Note    Gerhardt Gander Patient Status:  Inpatient    1965 MRN [id number]   Location [facility] 4W/SW/SE Attending Erick Rodriguez MD   Hosp Day # 9 PCP Jeronimo Basilio MD hepatic metastases/masses. Gallbladder not identified. Somewhat boggy appearance of the pancreas. Can't exclude pancreatitis. Upper limits of normal of the main pancreatic duct measuring 3 mm.   Normal directional flow within the main and right and left

## 2019-09-13 NOTE — PROGRESS NOTES
[facility]    Progress Note    Serenity Tapia Patient Status:  Inpatient    1965 MRN [id number]   Location [address] [facility] Attending Rhett Roberts MD   Hosp Day # 9 PCP Filomena Wright MD       Subjective:   Jaimee Merino 09/13/19  0506   *  --  109* 87   BUN 15  --  15 12   CREATSERUM 0.76  --  0.65* 0.50*   GFRAA 122  --  130 145   GFRNAA 106  --  113 125   CA 8.4*  --  8.0* 7.5*   ALB 2.0*  --  1.7* 1.3*     --  136 134*   K 3.8 3.5 3.9  3.8 3.7     -- 13.0 - 17.5 g/dL Final   09/09/2019 12.1 (L) 13.0 - 17.5 g/dL Final   09/07/2019 13.2 13.0 - 17.5 g/dL Final   09/04/2019 13.3 13.0 - 17.5 g/dL Final   07/16/2019 12.6 (L) 13.0 - 17.5 g/dL Final   07/15/2019 15.1 13.0 - 17.5 g/dL Final   07/22/2016 13.5 12 left renal calculus. 10. Status post appendectomy. 11. Lesser incidental findings as above.     Dictated by (CST): Teresa Arguello MD on 9/11/2019 at 10:06     Approved by (CST): Teresa Arguello MD on 9/11/2019 at 10:24          Us Abdomen Complete With D

## 2019-09-14 NOTE — PROGRESS NOTES
[facility]  GI SERVICE PROGRESS NOTE    Nvoa Jayy Patient Status:  Inpatient    1965 MRN [id number]   Location [facility] 4W/SW/SE Attending Rickie Bonilla MD   Hosp Day # 10 PCP Mike Mckeon MD       Subjective carcinoma diagnosed in early 2018 undergoing systemic and Y-90 therapy with progression of disease noted in July of this year with pulmonary lesions representing metastases who was admitted with worsening jaundice.  He was admitted 9/4/2019 for work-up and

## 2019-09-14 NOTE — PROGRESS NOTES
[facility] Pharmacy Note:  Renal Adjustment for piperacillin/tazobactam (Cass Barfield)    Melissa Otero is a 48year old male who has been prescribed piperacillin/tazobactam (ZOSYN) 3.375 g every 6 hrs.   CrCl is estimated creatinine clearance is 145.9 mL/min (A)

## 2019-09-14 NOTE — PROGRESS NOTES
[facility]    Progress Note    Sreemerrick Jc Patient Status:  Inpatient    1965 MRN [id number]   Location [facility] 4W/SW/SE Attending Gregoria Coronado, [facility]  Day # 10 PCP Ignacia Lorenzana MD       Subjective:   Adilson Michaud 09/14/2019    AST 94 (H) 09/14/2019    ALT 51 09/14/2019    PTT 33.9 09/04/2019    INR 1.04 09/04/2019    T4F 1.3 04/10/2019    TSH 0.689 04/10/2019    LIP 1,315 (HH) 09/14/2019    PSA 0.8 10/10/2018    ESRML 23 (H) 04/10/2019    CRP 3.17 (H) 04/10/2019

## 2019-09-14 NOTE — PLAN OF CARE
Patient is drowsy, but easily arousable and oriented. Pain is being controlled by dilaudid and tramadol with no nausea. Skin is jaundiced throughout. On room air. Patient has been tolerating full liquid diet.  On heparin subq for DVT prophylaxis and refused consult as needed  - Evaluate fluid balance  Outcome: Progressing     Problem: RISK FOR INFECTION - ADULT  Goal: Absence of fever/infection during anticipated neutropenic period  Description  INTERVENTIONS  - Monitor WBC  - Administer growth factors as ord

## 2019-09-14 NOTE — PLAN OF CARE
Problem: Patient Centered Care  Goal: Patient preferences are identified and integrated in the patient's plan of care  Description  Interventions:  - What would you like us to know as we care for you?  I am an RN in radiology   - Provide timely, complete, physical needs  - Identify cognitive and physical deficits and behaviors that affect risk of falls.   - [place] fall precautions as indicated by assessment.  - Educate pt/family on patient safety including physical limitations  - Instruct pt to call for a

## 2019-09-15 NOTE — CONSULTS
[address] [place] [address]  TEL: [phone]  FAX: [phone]    Gerhardt Gander Patient Status:  Inpatient    1965 MRN [id number]   Location [facility] 4W/SW/SE Attending Erick Rodriguez MD   Hosp Day # 11 [facility] 100 mL ADD-vantage, 3.375 g, Intravenous, Q8H  •  0.9% NaCl infusion, , Intravenous, Continuous  •  acetaminophen (TYLENOL) tab 650 mg, 650 mg, Oral, Q6H PRN  •  HYDROmorphone HCl (DILAUDID) 1 MG/ML injection 0.8 mg, 0.8 mg, Intravenous, Q2H PRN  •  HYDROm bruising. LYMPHATICS:  No enlarged nodes. No history of splenectomy. PSYCHIATRIC:  No depression or anxiety. ENDOCRINOLOGIC:  No reports of sweating, cold or heat intolerance. No polyuria or polydipsia.   ALLERGIES:  No history of asthma, hives, eczema o biliary sepsis  # Fever  # Leukocytosis  # Post-ERCP pancreatitis  # Biliary obstruction, s/p 9/6/19 ERCP with stent placement  # Metastatic hepatocellular carcinoma  # L lung opacity with L effusion    PLAN:    - d/c zosyn  - start meropenem pending klebs

## 2019-09-15 NOTE — PLAN OF CARE
Pt drowsy, but oriented x4. Requested tramadol x1 for pain - no requests for dilaudid. Tylenol PRN administered for low grade fevers - afebrile at this time. Ambulates independently. VSS. 2L O2 via nasal cannula.  Bed locked in lowest position, side rail fluid balance  Outcome: Progressing     Problem: RISK FOR INFECTION - ADULT  Goal: Absence of fever/infection during anticipated neutropenic period  Description  INTERVENTIONS  - Monitor WBC  - Administer growth factors as ordered  - Implement neutropenic

## 2019-09-15 NOTE — PROGRESS NOTES
[facility]    Progress Note    Sreemerrick Jc Patient Status:  Inpatient    1965 MRN [id number]   Location [facility] 4W/SW/SE Attending Gregoria Coronado, [address] [facility]  Day # 11 PCP Ignacia Lorenzana MD       Subjective:   Adilson Michaud 09/15/2019    AST 95 (H) 09/15/2019    ALT 55 09/15/2019    PTT 33.9 09/04/2019    INR 1.04 09/04/2019    T4F 1.3 04/10/2019    TSH 0.689 04/10/2019     (H) 09/15/2019    PSA 0.8 10/10/2018    ESRML 23 (H) 04/10/2019    CRP 3.17 (H) 04/10/2019    MG

## 2019-09-15 NOTE — PROGRESS NOTES
[address] [facility]    CDI Prediction Tool Protocol (Vancomycin Initiated)    OVP (oral vancomycin prophylaxis) 125 mg PO Daily is being started in this patient based on a score of 13.       Score Breakdown:  High risk antibiotic use (5 points)  Hospital

## 2019-09-15 NOTE — PROGRESS NOTES
[address]  GI SERVICE PROGRESS NOTE    Clarence Gaucher Patient Status:  Inpatient    1965 MRN [id number]   Location [facility] 4W/SW/SE Attending Edna Castellano MD   Hosp Day # 11 PCP Luis Parson MD       Subjective effusion. The pulmonary opacity is suspicious for pneumonia/infiltrate.     Dictated by (CST): Enrique Crocker MD on 9/14/2019 at 18:21     Approved by (CST): Enrique Crocker MD on 9/14/2019 at 18:23              Assessment and Plan:   48year old man with histo

## 2019-09-16 NOTE — PROGRESS NOTES
[facility] ID PROGRESS NOTE    Jae Stage Patient Status:  Inpatient    1965 MRN [id number]   Location [facility] 4W/SW/SE Attending Javier Bradford MD   Hosp Day # 12 PCP Andriy Aguilar MD     Subjective:  Awake, plans for MRCP today.  O leukocytosis 31.1. Was started on zosyn on 9/14. BC from 9/14 +Klebsiella. CXR with L perihilar opacity and L pleural effusion. ID consulted.      # Klebsiella bacteremia--> 2/2 BC +.   Suspect biliary sepsis   -FU MRCP  # Fever  # Leukocytosis  # Post-

## 2019-09-16 NOTE — PROGRESS NOTES
[place] [facility] - [facility]    Progress Note    Laureen Healy Patient Status:  Inpatient    1965 MRN [id number]   Location [address] [facility] Attending Keegan Candelario MD   Hosp Day # 12 PCP Ranjan uK MD       Subjective:   Sveta Gamez 09/16/19  0501   * 136*  --  99   BUN 13 14  --  13   CREATSERUM 0.56* 0.76  --  0.60*   GFRAA 138 122  --  135   GFRNAA 120 106  --  116   CA 7.6* 7.5*  --  7.3*   ALB 1.4* 1.5*  --  1.3*   * 137  --  136   K 3.6 3.2* 3.6 3.3*   CL 97* 99  -- 09/16/2019 10.4 (L) 13.0 - 17.5 g/dL Final   09/15/2019 11.1 (L) 13.0 - 17.5 g/dL Final   09/14/2019 11.0 (L) 13.0 - 17.5 g/dL Final   09/13/2019 11.0 (L) 13.0 - 17.5 g/dL Final   09/09/2019 12.1 (L) 13.0 - 17.5 g/dL Final   09/07/2019 13.2 13.0 - 17.5 g

## 2019-09-16 NOTE — PROGRESS NOTES
[facility] HOSP - [facility]    Hematology/Oncology   Progress Note    Wyalmitadonlinnea Starr Patient Status:  Inpatient    1965 MRN [id number]   Location [facility] 4W/SW/SE Attending Zena Osullivan MD   Hosp Day # 12 PCP Avtar De La Fuente MD lenvatinib in the last week  –Now hospitalized with obstructive jaundice. ERCP stenting completed. Now with post ERCP pancreatitis. MRI today. Can resume lenvatinib at discharge. Appreciate gi input.      Cherie Ugarte MD

## 2019-09-16 NOTE — PROGRESS NOTES
[address]  GI SERVICE PROGRESS NOTE    Berylmj Oliveros Patient Status:  Inpatient    1965 MRN [id number]   Location [facility] 4W/SW/SE Attending Barbara Adams MD   Hosp Day # 12 PCP Sybil Ocasio MD       Subjective opacity with moderate-sized left pleural effusion. The pulmonary opacity is suspicious for pneumonia/infiltrate.     Dictated by (CST): Felice Jorge MD on 9/14/2019 at 18:21     Approved by (CST): Felice Jorge MD on 9/14/2019 at 18:23              Assessme

## 2019-09-16 NOTE — PAYOR COMM NOTE
--------------  CONTINUED STAY REVIEW    Payor: [employer]  Subscriber #:  [id number]  Authorization Number: [id number]    Admit date: 9/4/19  Admit time: 2894    Admitting Physician: Edna Castellano MD  Attending Physician:  Stevie Laura with h/o metastatic hepatocellular carcinoma admitted 9/4/19 with obstructive jaundice.  On 9/6/19 had ERCP with stent placement.  Developed post ERCP pancreatitis.  Now with fever 101 and increased leukocytosis 31.1.  Was started on zosyn on 9/14. [facility] fro Family History   Problem Relation Age of Onset   • Colon Cancer Paternal Grandfather         reports that he has never smoked.  He has never used smokeless tobacco. He reports that he does not drink alcohol or use drugs.     Allergies:     [employer] vision or yellow sclerae. Ears, Nose, Throat:  No hearing loss, sneezing, congestion, runny nose or sore throat. SKIN:  No rash or itching. CARDIOVASCULAR:  No chest pain, chest pressure or chest discomfort. No palpitations or edema.   RESPIRATORY:  No sh 09/15/2019     CA 7.5 09/15/2019     ALB 1.5 09/15/2019     ALKPHO 466 09/15/2019     BILT 7.5 09/15/2019     TP 5.6 09/15/2019     AST 95 09/15/2019     ALT 55 09/15/2019      09/15/2019         Microbiologic Data:      Reviewed in EMR     Imaging: 90.3 89.4   MCH 30.5 30.6 30.8   MCHC 33.8 34.0 34.5   RDW 16.8* 16.7* 16.6*   NEPRELIM 16.51* 22.54* 26.51*   WBC 18.3* 26.3* 30.6*   .0 306.0 320.0               Lab Results   Component Value Date     INR 1.04 09/04/2019     INR 1.3 (H) 10/25/2018 also has leukocytosis which is likely from pancreatitis/malignancy leukomoid reaction.      #HCC/cirrhosis - undergoing systemic therapy and  Y90, but there is progression of disease.  Can resume lenvatinib at discharge per oncology.      Suggest:  · Mckay Huffman injection 5,000 Units     Date Action Dose Route User    9/16/2019 1048 Given 5000 Units Subcutaneous (Right Upper Arm) [facility]    9/15/2019 2035 Given Other 5000 Units Subcutaneous (Right Upper Arm) Karey Hebert, RN      HYDROmorphone HC

## 2019-09-16 NOTE — PLAN OF CARE
Problem: Patient Centered Care  Goal: Patient preferences are identified and integrated in the patient's plan of care  Description  Interventions:  - What would you like us to know as we care for you?  I am an RN in radiology   - Provide timely, complete, physical needs  - Identify cognitive and physical deficits and behaviors that affect risk of falls.   - [place] fall precautions as indicated by assessment.  - Educate pt/family on patient safety including physical limitations  - Instruct pt to call for a

## 2019-09-17 NOTE — PLAN OF CARE
Patient alert and oriented. On room air. Patient voids via urinal. Pain managed with PRN Tramadol and Dilaudid. Receiving IVF and IV meropenem. Up independently. SQ Heparin and SCDs. Tolerating a full liquid diet. Fall precautions in place.  Call light with Nasogastric tube to low intermittent suction as ordered  - Evaluate effectiveness of ordered antiemetic medications  - Provide nonpharmacologic comfort measures as appropriate  - Advance diet as tolerated, if ordered  - Obtain nutritional consult as needed on physician/LIP order or complex needs related to functional status, cognitive ability or social support system  Outcome: Progressing

## 2019-09-17 NOTE — PROGRESS NOTES
[facility] ID PROGRESS NOTE    Dalton Griffin Patient Status:  Inpatient    1965 MRN [id number]   Location [facility] 4W/SW/SE Attending Wood Rodriguez MD   Hosp Day # 15 PCP Tyree Medina MD     Subjective:  Awake, developed rigors with d ERCP with stent placement. Developed post ERCP pancreatitis. Now with fever 101 and increased leukocytosis 31.1. Was started on zosyn on 9/14. BC from 9/14 +Klebsiella. CXR with L perihilar opacity and L pleural effusion.   ID consulted.      # Samira rIby

## 2019-09-17 NOTE — CONSULTS
[facility] HOSP - [facility]    Consult Note    Date:  9/17/2019  Date of Admission:  9/4/2019      Chief Complaint:   Marily Resendiz is a(n) 48year old male with dyspnea.     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 Cardiac regular rate and rhythm no murmur. Abdomen epigastric tenderness, without hepatosplenomegaly and no mass appreciable. Extremities without clubbing cyanosis nor edema.    Neurologic grossly intact with symmetric t

## 2019-09-17 NOTE — PROGRESS NOTES
[facility]      Sepsis Reassessment Note    BP (!) 186/94 (BP Location: Left arm)   Pulse (!) 128   Temp (!) 101.2 °F (38.4 °C) (Oral)   Resp (!) 32   Ht 5' 7\" (1.702 m)   Wt 158 lb 8 oz (71.9 kg)   SpO2 95%   BMI 24.82 kg/m²      9:14 A

## 2019-09-17 NOTE — PROCEDURES
[facility]  Procedure Note  19    Julio César Brian Patient Status:  Inpatient    1965 MRN [id number]   Location [facility] 4W/SW/SE Attending Favio Chaudhary MD   Hosp Day # 15 PCP Roel Garcia MD     Procedure: Nilson Hong

## 2019-09-17 NOTE — PLAN OF CARE
Discussed plan of care for day, including all given medications and their indications. Pain slightly improved today per patient -- receiving PO Tramadol with IV Dilaudid for breakthrough. Tolerating small amount full liquids.   S/P MRI of abdomen -- await Progressing     Problem: RISK FOR INFECTION - ADULT  Goal: Absence of fever/infection during anticipated neutropenic period  Description  INTERVENTIONS  - Monitor WBC  - Administer growth factors as ordered  - Implement neutropenic guidelines  Outcome: Pro

## 2019-09-17 NOTE — PROGRESS NOTES
[facility]    Hematology/Oncology   Progress Note    Seymourervin Iglesias Patient Status:  Inpatient    1965 MRN [id number]   Location [facility] 4W/SW/SE Attending Herman Bear MD   Hosp Day # 15 PCP Cem Lee MD from September 14, 2019. 2. Extensive opacification left chest from consolidation/atelectasis/effusion with lesser findings at the right base. 3. Tortuous aorta. 4. Biliary stent. Dictated by (CST):  Leela Sifuentes MD on 9/17/2019 at 7:06     Approved the pancreas have decreased. Interval development of a 4 mm diameter cystic focus in the pancreatic tail suggesting pseudocyst.  The pancreas otherwise has fairly homogeneous enhancement suggesting absence of necrosis. 4.  Renal cysts.    Dictated by (CST)

## 2019-09-17 NOTE — PROGRESS NOTES
[facility]    Progress Note    Melissa Fat Patient Status:  Inpatient    1965 MRN [id number]   Location [facility] Attending Bird Russell MD   Hosp Day # 15 PCP Ritu Toussaint MD       Subjective:   Lorena Castrejon 09/14/19  0524 09/15/19  0542 09/16/19  0501   RBC 3.57* 3.59* 3.41*   HGB 11.0* 11.1* 10.4*   HCT 31.9* 32.1* 30.1*   MCV 89.4 89.4 88.3   MCH 30.8 30.9 30.5   MCHC 34.5 34.6 34.6   RDW 16.6* 16.9* 16.9*   NEPRELIM 26.51* 27.17* 24.08*   WBC 30.6* 31.1* 2 125 >=60 Final   09/11/2019 113 >=60 Final   09/09/2019 106 >=60 Final     WBC   Date Value Ref Range Status   09/16/2019 28.2 (H) 4.0 - 11.0 x10(3) uL Final   09/15/2019 31.1 (H) 4.0 - 11.0 x10(3) uL Final   09/14/2019 30.6 (H) 4.0 - 11.0 x10(3) uL Final up to 12 mm in diameter which show homogeneous or umbilicated arterial enhancement with subsequent washout suggesting additional neoplastic lesions. 2.  Common bile duct stent is present in satisfactory position.   There is moderate intrahepatic biliary gladis

## 2019-09-17 NOTE — PROGRESS NOTES
[address]  GI SERVICE PROGRESS NOTE    Jaguar Waldrop Patient Status:  Inpatient    1965 MRN [id number]   Location [facility] 4W/SW/SE Attending Deonna Fatima MD   Hosp Day # 15 PCP Janine Crocker MD       Subjective * 348       Recent Labs   Lab 09/16/19  0501   MG 2.1       Recent Labs   Lab 09/14/19  1835   BLDSMR Gram Negative Rods*         Xr Chest Ap Portable  (cpt=71045)    Result Date: 9/17/2019  CONCLUSION:  1. Suboptimal inspiration.   Interval decrea exam.  There is no pancreatic ductal dilatation or divisum. Gallbladder contains  tiny stones and dependent sludge. Interval development of a 4 mm diameter cystic focus in the pancreatic tail suggesting pseudocyst as it is new since 9/4/19 MRI.  3.  There 9/14/19. Per ID on abx. #HCC/cirrhosis - undergoing systemic therapy and  Y90, but there is progression of disease. Can resume lenvatinib at discharge per oncology. #SEpsis-- possible PNA on CXR, S/p L thoracentesis, plan for ICU transfer.  Possible

## 2019-09-17 NOTE — PROGRESS NOTES
[facility] Dosing Service    Initial Pharmacokinetic Consult for Vancomycin Dosing     Clarence Gaucher is a 48year old male who is being treated for pneumonia.   Pharmacy has been asked to dose Vancomycin by Dr. Beto High    He is allergic to radiolo PharmD  9/17/2019  3:40 PM  [address] [place]: [phone]

## 2019-09-18 NOTE — CM/SW NOTE
9/18: SW was notified pt was just made DNR status, has metastatic cancer. SW placed POLST form on chart. MD to discuss w/ pt/family, fill out middle section of POLST form, and sign prior to SW/CTL/RN witnessing POLST form.      SW/CM to remain available

## 2019-09-18 NOTE — PAYOR COMM NOTE
--------------  CONTINUED STAY REVIEW-----REQUESTING ADDITIONAL DAY 9/18      Payor: [employer]ERNANDEZ E[employer]  Subscriber #:  [id number]  Authorization Number: [id number]    Admit date: 9/4/19  Admit time: 1414    Admitting Physician: Laurence Grider MD  At    Transaminitis  Secondary to #1. Resolved.       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)  Per Dr. Arnaldo Whitaker     Slightly progressive left lung infiltrate per my evaluation  Antibiotics per infectious disease.   Thoracentesis greater t 09/18/2019 0.51 (L) 0.70 - 1.30 mg/dL Final   09/17/2019 0.49 (L) 0.70 - 1.30 mg/dL Final   09/16/2019 0.60 (L) 0.70 - 1.30 mg/dL Final   09/15/2019 0.76 0.70 - 1.30 mg/dL Final   09/14/2019 0.56 (L) 0.70 - 1.30 mg/dL Final   09/13/2019 0.50 (L) 0.70 - 1.3 07/22/2016 260.0 140.0 - 415.0 10 Final         Scheduled Meds:   • potassium chloride 40mEq IVPB (peripheral line)  40 mEq Intravenous Once   • meropenem  1 g Intravenous Q8H   • vancomycin  25 mg/kg Intravenous Once   • vancomycin  15 mg/kg Intravenous Q CONCLUSION:  1. The liver has a cirrhotic morphology. There is a dominant lesion within segment 5/8 with associated intrahepatic biliary ductal dilatation and capsular retraction. This may represent necrosis and post treatment change.   No delayed enhanc 9/17/2019 1131 Given 1.5 mg Oral Judi Byrne RN      Heparin Sodium (Porcine) 5000 UNIT/ML injection 5,000 Units     Date Action Dose Route User    9/17/2019 2108 Given 5000 Units Subcutaneous (Left Upper Arm) Lorena Ricci RN      [place] [facility].

## 2019-09-18 NOTE — PROGRESS NOTES
[facility] ID PROGRESS NOTE    Eduarda Haynes Patient Status:  Inpatient    1965 MRN [id number]   Location [facility] 4W/SW/SE Attending Kylee Burrows MD   Hosp Day # 15 PCP Cecilio Larry MD     Subjective:  Awake, on 3L NC.  Feeling ralph 101 and increased leukocytosis 31.1. Was started on zosyn on 9/14. BC from 9/14 +Klebsiella. CXR with L perihilar opacity and L pleural effusion. ID consulted.      # Klebsiella bacteremia--> 2/2 BC +.   Suspect biliary sepsis   -Repeat blood cx NGTD

## 2019-09-18 NOTE — PROGRESS NOTES
Pt transferred from 4th floor this afternoon. RH PIV needs to be changed, 3 RNs attempted to change prior to this RN leaving for day. Endorsed to [id number] Jennifer Landrum Lomas shift RN to attempt to changed IV site. Current site without redness/swelling/bleeding noted.  IVF infusing

## 2019-09-18 NOTE — PLAN OF CARE
Problem: Patient Centered Care  Goal: Patient preferences are identified and integrated in the patient's plan of care  Description  Interventions:  - What would you like us to know as we care for you?  I am an RN in radiology   - Provide timely, complete, lowest/locked position. Side rails up x2, call light in reach. Pt oriented to abilities and limitations and calls appropriately for assistance. Gait steady, get RAMESH, wears O2 3L NC. No falls/injury noted at this time.  CPM

## 2019-09-18 NOTE — DIETARY NOTE
ADULT NUTRITION REASSESSMENT     Pt is at moderate nutrition risk. Pt does not meet malnutrition criteria. At admit. RECOMMENDATIONS TO MD:  See Nutrition Intervention Oral nutrition supplements in place, diet advanced.  RD monitoring adequacy and d flavor. Rational/use of oral supplements discussed.   - Vitamin and mineral supplements: multivitamin/mineral added   - Feeding assistance: independent  - Nutrition education: 9/6 discussed importance of adequate intake, high calorie/high protein foods--re Preferences: Not Obtained    MEDICATIONS: reviewed. 83ml/hr lactated ringers, Lasix trial today.       LABS: reviewed  Recent Labs     09/16/19  0501 09/17/19  0521 09/18/19  0425   GLU 99 90 120*   BUN 13 14 19*   CREATSERUM 0.60* 0.49* 0.51*   CA 7.3* 7.4

## 2019-09-18 NOTE — PROGRESS NOTES
[facility] HOSP - [facility]    Hematology/Oncology   Progress Note    Sreemerrick Hosea Patient Status:  Inpatient    1965 MRN [id number]   Location [facility] 4W/SW/SE Attending Gregoria Coronado MD   Hosp Day # 15 PCP Ignacia Lorenzana MD pneumothorax. Small right pleural effusion. Mild to moderate bibasilar airspace disease more likely atelectasis and or consolidation, and mild right lung base airspace disease. Can't exclude underlying pneumonia. Follow-up studies are advised.     Haroldo Keita since prior MRI from 9/4/19. Compared with prior exam with less inflammatory changes around the proximal/central aspect of the pancreas have decreased.   Interval development of a 4 mm diameter cystic focus in the pancreatic tail suggesting pseudocyst.  Th

## 2019-09-18 NOTE — PROGRESS NOTES
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 Patient informed he could rescind this order at any time. I told patient however that this is reasonable given his progression of disease.         Results:     Recent Labs   Lab 09/16/19  0501 09/17 09/13/2019 145 >=60 Final     GFR NON-   Date Value Ref Range Status   07/22/2016 60.0  Final     GFR, Non-   Date Value Ref Range Status   09/18/2019 124 >=60 Final   09/17/2019 127 >=60 Final   09/16/2019 116 >=60 Final inspiration. Interval decrease in what is now a small left pleural effusion after thoracentesis. No pneumothorax. Small right pleural effusion.   Mild to moderate bibasilar airspace disease more likely atelectasis and or consolidation, and mild right lucero since 9/4/19 MRI. 3.  There are findings compatible with acute pancreatitis which has slightly improved since prior MRI from 9/4/19. Compared with prior exam with less inflammatory changes around the proximal/central aspect of the pancreas have decreased.

## 2019-09-18 NOTE — PLAN OF CARE
Patient on 3L NC with stats in the in the high 90s. No reports of nausea or vomiting. Patient had one bowel movement on 9/18. Patient report their pain as manageable. Nurse offered and educated patient on pain management.  Patient still refused to take pain injury  Description  INTERVENTIONS:  - Assess pt frequently for physical needs  - Identify cognitive and physical deficits and behaviors that affect risk of falls.   - [place] fall precautions as indicated by assessment.  - Educate pt/family on patient sa

## 2019-09-18 NOTE — PROGRESS NOTES
[facility]    Progress Note      Assessment and Plan:     1. Dyspnea with enlarging pleural effusion– the patient underwent drainage of a bilious left pleural effusion yesterday with significant improvement in his dyspnea.   Also, his fever Component Value Date    WBC 33.7 09/18/2019    HGB 9.8 09/18/2019    HCT 29.1 09/18/2019    .0 09/18/2019    CREATSERUM 0.51 09/18/2019    BUN 19 09/18/2019     09/18/2019    K 3.6 09/18/2019     09/18/2019    CO2 25.0 09/18/2019    GL

## 2019-09-18 NOTE — PROGRESS NOTES
[address]     Gastroenterology Progress Note    Parisa Castillo Patient Status:  Inpatient    1965 MRN [id number]   Location [facility] 4W/SW/SE Attending Lionel Lazcano, [address] [facility] [address] Day # 15 PCP Trisha Galvan MD effusion. Persistent right lung base airspace disease/effusion. Can't exclude underlying pneumonia. Follow-up studies are advised.     Dictated by (CST): Roland Escoto MD on 9/18/2019 at 9:19     Approved by (CST): Roland Escoto MD on 9/18/2019 at 9:21 satisfactory position. There is moderate intrahepatic biliary ductal dilatation which has not significantly changed since previous exam.  There is no pancreatic ductal dilatation or divisum. Gallbladder contains  tiny stones and dependent sludge.   Andrew Hedrick Donna Andrews, [address] [place] [address] - Gastroenterology  9/18/2019

## 2019-09-19 NOTE — PLAN OF CARE
VSS.  A/Ox4. C/o pain relived by prn med. Poor appetite. Goes to bathroom with minimal assist. On IVF. Pt to be transferred to room 448. Report given. Waiting for transport.

## 2019-09-19 NOTE — PHYSICAL THERAPY NOTE
PHYSICAL THERAPY EVALUATION - INPATIENT     Room Number: 448/448-A  Evaluation Date: 9/19/2019  Type of Evaluation: Initial   Physician Order: PT Eval and Treat    Presenting Problem: weakness  Reason for Therapy: Mobility Dysfunction and Discharge Planni negotiate a flight of stairs to get to 2nd floor apartment. The patient's Approx Degree of Impairment: 54.16% has been calculated based on documentation in the [facility] '6 clicks' Inpatient Basic Mobility Short Form.   Research supports that patients with this so tired\"    PHYSICAL THERAPY EXAMINATION     OBJECTIVE  Precautions: Bed/chair alarm  Fall Risk: Standard fall risk    WEIGHT BEARING RESTRICTION  Weight Bearing Restriction: None                PAIN ASSESSMENT  Ratin          COGNITION  · Overall Co Device: Rolling walker  Pattern: Shuffle  Stoop/Curb Assistance: Not tested     Exercise/Education Provided:  Bed mobility  Body mechanics  Energy conservation  Functional activity tolerated  Gait training  Posture  Strengthening  Lower therapeutic exercis

## 2019-09-19 NOTE — PROGRESS NOTES
[facility]    Progress Note      Assessment and Plan:     1. Dyspnea with pleural effusion– Klebsiella bacteremia. The breathing is improved. The fever is better and the pleural effusion was drained.   The pleural effusion has partially r BUN 17 09/19/2019     09/19/2019    K 3.6 09/19/2019     09/19/2019    CO2 27.0 09/19/2019     09/19/2019    CA 7.4 09/19/2019     Chest x-ray–partial reaccumulation of pleural fluid on the left    Brad Grant MD  Medical Director,

## 2019-09-19 NOTE — PLAN OF CARE
Problem: Patient Centered Care  Goal: Patient preferences are identified and integrated in the patient's plan of care  Description  Interventions:  - What would you like us to know as we care for you?  I am an RN in radiology   - Provide timely, complete, physical needs  - Identify cognitive and physical deficits and behaviors that affect risk of falls.   - [place] fall precautions as indicated by assessment.  - Educate pt/family on patient safety including physical limitations  - Instruct pt to call for a patient stated it has improved since yesterday. Will continue to monitor.

## 2019-09-19 NOTE — PHYSICAL THERAPY NOTE
PT orders received. Patient on bedrest- RN made aware. Will attempt when activity orders updated.    Thank you,  Constanza Escalante, PT, DPT

## 2019-09-19 NOTE — PLAN OF CARE
Problem: Patient Centered Care  Goal: Patient preferences are identified and integrated in the patient's plan of care  Description  Interventions:  - What would you like us to know as we care for you?  I am an RN in radiology   - Provide timely, complete, physical needs  - Identify cognitive and physical deficits and behaviors that affect risk of falls.   - [place] fall precautions as indicated by assessment.  - Educate pt/family on patient safety including physical limitations  - Instruct pt to call for a

## 2019-09-19 NOTE — PLAN OF CARE
Skin checked with Nina Flanagan . No skin issues noted. Pt transferred to [facility]. Belongings sent with pt. Remote tele on.

## 2019-09-19 NOTE — PROGRESS NOTES
[facility]    Hematology/Oncology   Progress Note    Queenie Kaminski Patient Status:  Inpatient    1965 MRN [id number]   Location [facility] 4W/SW/SE Attending Gina Frazier MD   Hosp Day # 13 PCP Efrem Shrestha MD (cpt=[id number])    Result Date: 9/17/2019  CONCLUSION:  1. Suboptimal inspiration. Interval decrease in what is now a small left pleural effusion after thoracentesis. No pneumothorax. Small right pleural effusion.   Mild to moderate bibasilar airspace diseas

## 2019-09-19 NOTE — PROGRESS NOTES
[facility] ID PROGRESS NOTE    Tapia General Patient Status:  Inpatient    1965 MRN [id number]   Location [facility] 4W/SW/SE Attending Alexandra Lyon MD   Hosp Day # 15 PCP Jackelyn Mendoza MD     Subjective:  Awake, Tmax 99. 3.on 2L NC.  On pancreatitis. Now with fever 101 and increased leukocytosis 31.1. Was started on zosyn on 9/14. BC from 9/14 +Klebsiella. CXR with L perihilar opacity and L pleural effusion. ID consulted.      # Klebsiella bacteremia--> 2/2 BC +.   Suspect biliary sep

## 2019-09-20 NOTE — PLAN OF CARE
Problem: Patient Centered Care  Goal: Patient preferences are identified and integrated in the patient's plan of care  Description  Interventions:  - What would you like us to know as we care for you?  I am an RN in radiology   - Provide timely, complete, physical needs  - Identify cognitive and physical deficits and behaviors that affect risk of falls.   - [place] fall precautions as indicated by assessment.  - Educate pt/family on patient safety including physical limitations  - Instruct pt to call for a Order was placed by ID for PICC insertion for patient to receive long term antibiotics after d/c to rehab. PICC RN Hope Knight aware, however line unable to be placed today, she stated possible insertion tomorrow (9/21).  Patient discussed topic of DNR with RN

## 2019-09-20 NOTE — PROGRESS NOTES
[place] [facility] - [facility]    Progress Note    Laureen Healy Patient Status:  Inpatient    1965 MRN [id number]   Location [address] [facility] Attending Keegan Candelario MD   Hosp Day # 12 PCP Ranjan Ku MD       Subjective:   Sveta Gamez Prognosis guarded. Pedal edema  We will decrease IV rate. Most likely related to hypoalbuminemia.   We will have nutrition see patient    Protein and calorie malnutrition with pedal edema  Supplementation per dietitian    Slow rise in alk phos  Monitor (L) 0.70 - 1.30 mg/dL Final   09/17/2019 0.49 (L) 0.70 - 1.30 mg/dL Final   09/16/2019 0.60 (L) 0.70 - 1.30 mg/dL Final   09/15/2019 0.76 0.70 - 1.30 mg/dL Final   09/14/2019 0.56 (L) 0.70 - 1.30 mg/dL Final     GFR    Date Value Ref Range 1 g Intravenous Q8H   • vancomycin HCl  125 mg Oral Daily   • Heparin Sodium (Porcine)  5,000 Units Subcutaneous 2 times per day       Continuous Infusions:   • sodium chloride 42 mL/hr at 09/20/19 0505   • norepinephrine             Xr Chest Ap Portable

## 2019-09-20 NOTE — PLAN OF CARE
Samir Yecenia c/o abdominal pain this evening, IVP dilaudid given prn. Denies nausea. IV cefepime given per order. Tolerating soft diet but with decreased appetite. 2L oxygen via NC, dyspnea with exertion. Heparin subcutaneous and scd's for dvt prophylaxis.  Sebastian Problem: DISCHARGE PLANNING  Goal: Discharge to home or other facility with appropriate resources  Description  INTERVENTIONS:  - Identify barriers to discharge w/pt and caregiver  - Include patient/family/discharge partner in discharge Jose G Shah

## 2019-09-20 NOTE — PROGRESS NOTES
[facility] ID PROGRESS NOTE    Beryl Lebron Patient Status:  Inpatient    1965 MRN [id number]   Location [facility] 4W/SW/SE Attending Sheree Price MD   Hosp Day # 16 PCP Debbie Quintero MD     Subjective:  Awake, afebrile.  Has been eati leukocytosis 31.1. Was started on zosyn on 9/14. BC from 9/14 +Klebsiella. CXR with L perihilar opacity and L pleural effusion. ID consulted.      # Klebsiella bacteremia--> 2/2 BC +.   Suspect biliary sepsis   -Repeat blood cx NGTD   -MRCP with moderat

## 2019-09-20 NOTE — PROGRESS NOTES
[facility] HOSP - [facility]    Hematology/Oncology   Progress Note    Kofi Wesley Patient Status:  Inpatient    1965 MRN [id number]   Location [facility] 4W/SW/SE Attending Dioni Busby MD   Hosp Day # 12 PCP Praneeth Cochran MD 9/18/2019 at 9:19     Approved by (CST): Lucero Mcfarland MD on 9/18/2019 at 9:21          Medications reviewed.     Assessment and Plan:  51-year-old male with a remote history of hepatitis A following with medical oncology for metastatic hepatocellular carc

## 2019-09-20 NOTE — DIETARY NOTE
ADULT NUTRITION UPDATE NOTE      RECOMMENDATIONS TO MD:  See Nutrition Intervention Oral nutrition supplements in place, diet advanced. RD monitoring adequacy and diet tolerance.  If remains poor pt is candidate for alternate feeding-enteral route vs PN i sources and discussed benefits of nutritional supplements. 9/20/19 reinforce importance of good energy & protein intake    ANTHROPOMETRICS:  HT: 170.2 cm (5' 7\")  WT: 74.1 kg (163 lb 6.4 oz)--increased 15% since admission- edema legs noted.  Use admit wt Brittany Padilla RDN, LDN  Clinical Nutrition  Ext [phone]

## 2019-09-20 NOTE — PROGRESS NOTES
[facility]    Progress Note    Clarence Gaucher Patient Status:  Inpatient    1965 MRN [id number]   Location [facility] 4W/SW/SE Attending Edna Castellano MD   Hosp Day # 12 PCP Luis Parson MD        Subjective:    Crump 09/20/2019    ALT 87 (H) 09/20/2019    PTT 39.5 (H) 09/17/2019    INR 2.36 (H) 09/17/2019    T4F 1.3 04/10/2019    TSH 0.689 04/10/2019     09/17/2019    PSA 0.8 10/10/2018    ESRML 23 (H) 04/10/2019    CRP 3.17 (H) 04/10/2019    MG 2.1 09/16/2019

## 2019-09-20 NOTE — CM/SW NOTE
MD order received regarding rehab. ALPHONSE met with the pt. At bedside. TERRI list provided and explained. The pt. Is aware PMR consult is pending. The pt. Would be agreeable to a referral to  if acute rehab is recommended.   Referral has been sent to [facility]

## 2019-09-21 NOTE — PLAN OF CARE
IV abx as scheduled. PRN dilaudid IVP for intermittent abdominal pain. Up with standby assist. Using urinal. Plan for possible PICC line for long term abx.        Problem: Patient Centered Care  Goal: Patient preferences are identified and integrated in the neutropenic guidelines  Outcome: Progressing     Problem: SAFETY ADULT - FALL  Goal: Free from fall injury  Description  INTERVENTIONS:  - Assess pt frequently for physical needs  - Identify cognitive and physical deficits and behaviors that affect risk of

## 2019-09-21 NOTE — PLAN OF CARE
Vss, skin is still jaundice, Dilaudid for pain, ivf and abt continued, New picc line placed for long term ABT therapy at SNF, plan to discharge to SNF, possibly [facility]. Tolerating  Diet, ambulating the halls with therapy, feet elevated for edema .  All neutropenic period  Description  INTERVENTIONS  - Monitor WBC  - Administer growth factors as ordered  - Implement neutropenic guidelines  Outcome: Progressing     Problem: SAFETY ADULT - FALL  Goal: Free from fall injury  Description  INTERVENTIONS:  - As

## 2019-09-21 NOTE — OCCUPATIONAL THERAPY NOTE
OCCUPATIONAL THERAPY EVALUATION - INPATIENT     Room Number: 448/448-A  Evaluation Date: 9/21/2019  Type of Evaluation: Initial  Presenting Problem: Nausea, jaundice. H/O cancer with mets.     Physician Order: IP Consult to Occupational Therapy  Reason for Problem List  Principal Problem:    Hyperbilirubinemia  Active Problems:    Transaminitis    Obstructive jaundice    Malignant neoplasm metastatic to lung [facility])    Acute pancreatitis    Fever    Bacteremia      Past Medical History  Past Medical Histor COORDINATION  Gross Motor: WFL   Fine Motor: WFL       ACTIVITIES OF DAILY LIVING ASSESSMENT  AM-PAC ‘6-Clicks’ Inpatient Daily Activity Short Form  How much help from another person does the patient currently need…  -   Putting on and taking off regul

## 2019-09-21 NOTE — PHYSICAL THERAPY NOTE
PHYSICAL THERAPY TREATMENT NOTE - INPATIENT     Room Number: 448/448-A       Presenting Problem: weakness    Problem List  Principal Problem:    Hyperbilirubinemia  Active Problems:    Transaminitis    Obstructive jaundice    Malignant neoplasm metastatic much help from another person does the patient currently need. ..   -   Moving to and from a bed to a chair (including a wheelchair)?: A Little   -   Need to walk in hospital room?: A Little   -   Climbing 3-5 steps with a railing?: A Lot     AM-PAC Score:

## 2019-09-21 NOTE — PROGRESS NOTES
[place] [facility] - [facility]    Progress Note    Laureen Healy Patient Status:  Inpatient    1965 MRN [id number]   Location [address] [facility] Attending Keegan Candelario MD   Hosp Day # 16 PCP Ranjan Ku MD       Subjective:   Sveta Gamez hypoalbuminemia.   We will have nutrition see patient    Protein and calorie malnutrition with pedal edema  Supplementation per dietitian    Slow rise in alk phos  Monitor at present    Deconditioning  PMR consult    DNR  Patient told me directly that he wa mg/dL Final   09/16/2019 0.60 (L) 0.70 - 1.30 mg/dL Final   09/15/2019 0.76 0.70 - 1.30 mg/dL Final     GFR    Date Value Ref Range Status   07/22/2016 60.0  Final     GFR, -American   Date Value Ref Range Status   09/20/2019 135 >=6 Subcutaneous 2 times per day       Continuous Infusions:   • sodium chloride 20 mL/hr at 09/20/19 3002   • norepinephrine                         Rosi Owusu MD  9/20/2019

## 2019-09-21 NOTE — CM/SW NOTE
SW received MDO for discharge planning. PMR on consult; recommendations still pending at this time. Previous referral has been made to [facility] for review.      [facility] authorization needed for rehab**    [address], [address]

## 2019-09-22 NOTE — PLAN OF CARE
IVF and IV abx infusing via left PICC line. Patient's friends visiting in evening. Ambulatory in room. PRN dilaudid IVP x1 overnight for \"clenching\" abdominal pain. Voiding. No bowel movement overnight.        Problem: Patient Centered Care  Goal: Patient growth factors as ordered  - Implement neutropenic guidelines  Outcome: Progressing     Problem: SAFETY ADULT - FALL  Goal: Free from fall injury  Description  INTERVENTIONS:  - Assess pt frequently for physical needs  - Identify cognitive and physical def

## 2019-09-22 NOTE — PROGRESS NOTES
[facility]    Progress Note    Parisa Castillo Patient Status:  Inpatient    1965 MRN [id number]   Location [facility] 4W/SW/SE Attending Lionel Lazcano MD   Hosp Day # 16 PCP Trisha Galvan MD        Subjective: 09/20/2019     09/20/2019    K 3.4 (L) 09/20/2019     09/20/2019    CO2 29.0 09/20/2019     (H) 09/20/2019    CA 7.6 (L) 09/20/2019    ALB 1.4 (L) 09/20/2019    ALKPHO 696 (H) 09/20/2019    BILT 6.2 (H) 09/20/2019    TP 6.4 09/20/2019

## 2019-09-22 NOTE — PLAN OF CARE
Dilaudid x1 for pain, +BM today, ABT continued, tolerating diet, up to the chair during the day, plan for f/u CXR and thoracentesis tomorrow. [facility] once medically stable.   Problem: Patient Centered Care  Goal: Patient preferences factors as ordered  - Implement neutropenic guidelines  Outcome: Progressing     Problem: SAFETY ADULT - FALL  Goal: Free from fall injury  Description  INTERVENTIONS:  - Assess pt frequently for physical needs  - Identify cognitive and physical deficits a

## 2019-09-22 NOTE — PROGRESS NOTES
[facility] HOSP - [facility]    Progress Note    Clarence Gaucher Patient Status:  Inpatient    1965 MRN [id number]   Location [address] [facility] Attending Edna Castellano MD   Hosp Day # 25 PCP Luis Parson MD       Subjective:   Ramana Cruz rate.  Most likely related to hypoalbuminemia.   We will have nutrition see patient    Protein and calorie malnutrition with pedal edema  Supplementation per dietitian    Slow rise in alk phos  Monitor at present    Deconditioning  PMR consult from [facility] 09/22/2019 0.46 (L) 0.70 - 1.30 mg/dL Final   09/20/2019 0.60 (L) 0.70 - 1.30 mg/dL Final   09/19/2019 0.51 (L) 0.70 - 1.30 mg/dL Final   09/18/2019 0.51 (L) 0.70 - 1.30 mg/dL Final   09/17/2019 0.49 (L) 0.70 - 1.30 mg/dL Final   09/16/2019 0.60 (L) 0.70 Meds:   • lidocaine-menthol  1 patch Transdermal Daily   • cefepime  1 g Intravenous Q8H   • vancomycin HCl  125 mg Oral Daily   • Heparin Sodium (Porcine)  5,000 Units Subcutaneous 2 times per day       Continuous Infusions:   • sodium chloride 20 mL/hr a

## 2019-09-22 NOTE — PROGRESS NOTES
[facility]    Progress Note      Assessment and Plan:     1. Dyspnea with pleural effusion– Klebsiella bacteremia. The breathing is improved. The fever is better and the pleural effusion was drained.   The pleural effusion has partially r 09/22/2019    K 3.5 09/22/2019     09/22/2019    CO2 26.0 09/22/2019    GLU 97 09/22/2019    CA 7.5 09/22/2019     Chest x-ray–partial reaccumulation of pleural fluid on the left    Ike Dempsey MD  Medical Director, Critical Care, [address] [address]

## 2019-09-23 PROBLEM — Z71.89 COUNSELING REGARDING ADVANCE CARE PLANNING AND GOALS OF CARE: Status: ACTIVE | Noted: 2019-01-01

## 2019-09-23 NOTE — PLAN OF CARE
IVP Dilaudid given for pain control. Tolerating diet, appetite is less, per pts report. IV abx continued. Ambulating with PT/OT. Plan for pleurex catheter placement tomorrow. [facility] planning [facility] once medically stable.      Problem: Patient Centered Ca Monitor WBC  - Administer growth factors as ordered  - Implement neutropenic guidelines  Outcome: Progressing     Problem: SAFETY ADULT - FALL  Goal: Free from fall injury  Description  INTERVENTIONS:  - Assess pt frequently for physical needs  - Identify

## 2019-09-23 NOTE — PLAN OF CARE
IVF and IV abx infusing via left PICC line. PRN dilaudid for abdominal pain/sleep aid. Patient anxious about upcoming day's events and birthday. Voiding. On room air. Tolerating diet. Plan for chest xray in AM with possible repeat thoracentesis following. period  Description  INTERVENTIONS  - Monitor WBC  - Administer growth factors as ordered  - Implement neutropenic guidelines  Outcome: Progressing     Problem: SAFETY ADULT - FALL  Goal: Free from fall injury  Description  INTERVENTIONS:  - Assess pt freq

## 2019-09-23 NOTE — CONSULTS
A consult was requested on this 48year old man with chronic hepatocellular  Carcinoma. Presented with obstructive juandice. 9/6 - had ERCP with sphincterotomy and stet placement, with worsening abdominal pain post ERCP.  He had pancreatitis with marked yomaira

## 2019-09-23 NOTE — CM/SW NOTE
10/1 404pm: HHC orders received for the pleurx draining. Face to face has been entered.   [facility] and [facility] care are aware of discharge plan for today 10/1.  ----------------  9/30 900am: MD order received regarding possible discharge home german catheter. ALPHONSE spoke with Ulysses Torres, liaison with [facility] who is following the pt's case. PMR to follow up tomorrow to be sure the pt. Remains appropriate for acute rehab. Insurance [employer] is pending at this time. The pt.  Will need to be IV pain med free fo

## 2019-09-23 NOTE — HOME CARE LIAISON
Referral received from ALPHONSE/Jessica for community palliative care after [facility]. Referral was placed to office.

## 2019-09-23 NOTE — OCCUPATIONAL THERAPY NOTE
OCCUPATIONAL THERAPY TREATMENT NOTE - INPATIENT        Room Number: 448/448-A           Presenting Problem: Nausea, jaundice. H/O cancer with mets.     Problem List  Principal Problem:    Hyperbilirubinemia  Active Problems:    Transaminitis    Obstructive training;Equipment eval/education; Compensatory technique education    SUBJECTIVE  \"It [birth year] my birthday today. I'd rather be here feeling better than at home feeling bad (medically). \"     OBJECTIVE  Precautions: Bed/chair alarm    WEIGHT BEARING RESTRICTION complete toileting with SBA  Comment: MET 09/23/19    Patient will tolerate standing for 5 minutes in prep for adls with SBA   Comment: Progressing-2 minutes     Patient will complete LE dressing with SBA using LHAE PRN  Comment: MET 09/23/2019

## 2019-09-23 NOTE — PROGRESS NOTES
[facility]    Progress Note    Jae Stage Patient Status:  Inpatient    1965 MRN [id number]   Location [address] [facility] Attending Javier Bradford, [address] [facility] Se Day # 23 PCP Andriy Aguilar MD       Subjective:   Gwen Sheriff rate.  Most likely related to hypoalbuminemia. We will have nutrition see patient    Protein and calorie malnutrition with pedal edema  Supplementation per dietitian    Slow continuous rise in alk phos  We will ask GI to return for comment.     Decondition mg/dL Final   09/16/2019 0.60 (L) 0.70 - 1.30 mg/dL Final     GFR    Date Value Ref Range Status   07/22/2016 60.0  Final     GFR, -American   Date Value Ref Range Status   09/22/2019 150 >=60 Final   09/20/2019 135 >=60 Final   09/1 Infusions:   • sodium chloride 20 mL/hr at 09/20/19 0799   • norepinephrine                         Ander Colon MD  9/23/2019

## 2019-09-23 NOTE — PAYOR COMM NOTE
--------------  CONTINUED STAY REVIEW----REQUESTING ADDITIONAL DAY 9/23      Payor: [employer] FERNAN[employer] E[employer]  Subscriber #:  [id number]  Authorization Number: [id number]    Admit date: 9/4/19  Admit time: 1210    Admitting Physician: Jimbo Major MD  Att ICU transfer on 9/17/2019. Intensivist consult     Pneumonia and pleural effusion   another thoracentesis (9/17/2019) plan for today (9/23/2019)     Persistent pancreatitis with small 4 mm pseudocyst in the tail  Supportive care. Monitor lipase.  .     Kl BILT 5.8*  --  6.2*  --    TP 4.9*  --  6.4  --              Recent Labs   Lab 09/17/19  0521            No results for input(s): TROP, CK in the last 168 hours.         Recent Labs   Lab 09/17/19  0945   INR 2.36*         No results for input(s): P 07/22/2016 13.5 12.5 - 17.0 g/dL Final            PLT   Date Value Ref Range Status   09/22/2019 408.0 150.0 - 450.0 10(3)uL Final   09/20/2019 415.0 150.0 - 450.0 10(3)uL Final   09/19/2019 388.0 150.0 - 450.0 10(3)uL Final   09/18/2019 331.0 150.0 - 450. Plan:

## 2019-09-23 NOTE — TELEPHONE ENCOUNTER
Hebert Capellan called  For two things first he said Isaac Ernandez would help him with disability paperwork, he is still in hospital can send his brother for paperwork, Second he needs a letter because he can't travel. He has a trip in [address] [place] [address]. Thank you

## 2019-09-23 NOTE — PROGRESS NOTES
[address]     Gastroenterology Progress Note    Dalton Gaby Patient Status:  Inpatient    1965 MRN [id number]   Location [facility] 4W/SW/SE Attending Wood Rodriguez MD   Hosp Day # 23 PCP Tyree Medina MD 3.15*   HGB 11.1* 9.4* 9.6*   HCT 33.1* 27.3* 28.2*   MCV 90.2 88.9 89.5   MCH 30.2 30.6 30.5   MCHC 33.5 34.4 34.0   RDW 16.7* 16.4* 16.7*   NEPRELIM 24.32* 18.04* 21.31*   WBC 28.4* 22.5* 25.9*   .0 408.0 448.0         Recent Labs   Lab 09/18/19

## 2019-09-23 NOTE — PROGRESS NOTES
[place] [facility] - [facility]  Progress Note    Jeanice Saint Patient Status:  Inpatient    1965 MRN [id number]   Location [facility] 4W/SW/SE Attending Kalee Lopez MD   Hosp Day # 23 PCP Elmo Blake MD       Subjective:   Denise Blackmon

## 2019-09-23 NOTE — PROGRESS NOTES
[facility] ID PROGRESS NOTE    Gerhardt Gander Patient Status:  Inpatient    1965 MRN [id number]   Location [facility] 4W/SW/SE Attending Erick Rodriguez MD   Hosp Day # 23 PCP Jeronimo Basilio MD     Subjective:  Awake, Tmax 99.  Had Bm yesterd BC from 9/14 +Klebsiella. CXR with L perihilar opacity and L pleural effusion. ID consulted.      # Klebsiella bacteremia--> 2/2 BC +.   Suspect biliary sepsis   -Repeat blood cx NGTD   -MRCP with moderate ductal dilation, small pseudocyst  # Loculated L

## 2019-09-23 NOTE — PROGRESS NOTES
[facility]    Hematology/Oncology   Progress Note    Dalton Griffin Patient Status:  Inpatient    1965 MRN [id number]   Location [facility] 4W/SW/SE Attending Wood Rodriguez MD   Hosp Day # 23 PCP Tyree Medina MD medical oncology for metastatic hepatocellular carcinoma with pulmonary metastasis. Treatment as outlined above. He has pulmonary metastasis  –Recently started lenvatinib currently on hold  –Now hospitalized with obstructive jaundice.  ERCP stenting compl

## 2019-09-23 NOTE — PROGRESS NOTES
[facility]    Progress Note      Assessment and Plan:     1. Dyspnea with pleural effusion– Klebsiella bacteremia. The patient has recurrent pleural effusion and his dyspnea has recurred.   I discussed thoracentesis; however, the patient w  09/23/2019    CA 7.8 09/23/2019    ALB 1.4 09/23/2019    ALKPHO 780 09/23/2019    BILT 5.5 09/23/2019    TP 5.9 09/23/2019     09/23/2019    ALT 80 09/23/2019    PTT 37.1 09/23/2019    INR 1.29 09/23/2019     Chest x-ray–partial reaccumulati

## 2019-09-23 NOTE — CONSULTS
[address] [facility]  [id number]  Hospital Day #19  Date of Consult: 09/23/19  Patient seen at: [facility]    Reason for Consultation:      Consult requested by Dr Anatoly Galaviz for Conrado Sinha ENDOSCOPY   • ENDOSCOPIC RETROGRADE CHOLANGIOPANCREATOGRAPHY (ERCP) N/A 9/6/2019    Performed by Geovanna Jansen MD at [facility] N/A 6/11/2019    Performed by Harper Ness MD at [facility] MAIN OR       Social History: 80 mg, Oral, Q4H PRN  •  Heparin Sodium (Porcine) 5000 UNIT/ML injection 5,000 Units, 5,000 Units, Subcutaneous, 2 times per day  •  famoTIDine (PEPCID) tab 10 mg, 10 mg, Oral, Q6H PRN  •  Prochlorperazine Maleate (COMPAZINE) tab 10 mg, 10 mg, Oral, Q6H IN is felt to be less likely but is also in the differential.     Dictated by (CST): Kathy Connor MD on 9/23/2019 at 11:25     Approved by (CST): Kathy Connor MD on 9/23/2019 at 11:28              Review of Systems:      Palliative Care symptom needs assessed Reduced Full or confused   30 Bedbound Extensive Disease  Can’t do any work Max Assist  Total Care Reduced Drowsy/confused   20 Bedbound Extensive Disease  Can’t do any work Max Assist  Total Care Minimal Drowsy/confused   10 Bedbnd/coma Extensive Disease Obstructive jaundice        Malignant neoplasm metastatic to lung [facility])        Acute pancreatitis        Fever        Bacteremia        Counseling regarding advance care planning and goals of care          Palliative Care Recommendations/Plan:     1.  Goals

## 2019-09-24 NOTE — PHYSICAL THERAPY NOTE
Patient off the floor for procedure pleurex catheter placement. Will try to see him later if appropriate.

## 2019-09-24 NOTE — PROGRESS NOTES
[facility]    Hematology/Oncology   Progress Note    [facility] Patient Status:  Inpatient    1965 MRN [id number]   Location [facility] 4W/SW/SE Attending Dennis Elaine,  [facility] Se Day # 21 PCP Marcelina Morocho MD carcinoma with dominant infiltrative lesions of the right hepatic lobe. Again, there appears to be infiltrative tumor involvement of the biliary system. 5. Small volume intraperitoneal ascites. 6. Peripancreatic lymphadenopathy, likely metastatic.   7. Mo patient at length he is DNR. He understands that his cancer is not curable. The majority of his symptoms currently are related to pancreatitis/sepsis. He would like to establish care with palliative care while he is here also.   Appreciate palliative con

## 2019-09-24 NOTE — PROGRESS NOTES
[facility]    Progress Note    Zoila Iglesias Patient Status:  Inpatient    1965 MRN [id number]   Location [address] [facility] Attending Herman Bear MD   Hosp Day # 21 PCP Cem Lee MD       Subjective:   Ericka Deluca guarded. Pedal edema  We will decrease IV rate. Most likely related to hypoalbuminemia.   We will have nutrition see patient    Protein and calorie malnutrition with pedal edema  Supplementation per dietitian    Slow continuous rise in alk phos  We will hours.    Creatinine   Date Value Ref Range Status   09/23/2019 0.51 (L) 0.70 - 1.30 mg/dL Final   09/22/2019 0.46 (L) 0.70 - 1.30 mg/dL Final   09/20/2019 0.60 (L) 0.70 - 1.30 mg/dL Final   09/19/2019 0.51 (L) 0.70 - 1.30 mg/dL Final   09/18/2019 0.51 (L) 07/22/2016 260.0 140.0 - 415.0 10 Final       Scheduled Meds:   • ceFAZolin  2 g Intravenous Once   • lidocaine-menthol  1 patch Transdermal Daily   • cefepime  1 g Intravenous Q8H   • vancomycin HCl  125 mg Oral Daily   • Heparin Sodium (Porcine)  5,000

## 2019-09-24 NOTE — PLAN OF CARE
Patient asleep through night post oral dilaudid for pain. NPO through night for PleurX placement today. Denies nausea. Labs this morning.   Problem: Patient Centered Care  Goal: Patient preferences are identified and integrated in the patient's plan of c fall precautions as indicated by assessment.  - Educate pt/family on patient safety including physical limitations  - Instruct pt to call for assistance with activity based on assessment  - Modify environment to reduce risk of injury  - Provide assistive d

## 2019-09-24 NOTE — ANESTHESIA POSTPROCEDURE EVALUATION
Patient: Michael Poster    Procedure Summary     Date:  09/24/19 Room / Location:  [address] [place] [address] MAIN OR 18 / [address] [place] [address] MAIN OR    Anesthesia Start:  1306 Anesthesia Stop:  0049    Procedure:  PLEURX CATH INSERTION (Left ) Diagnosis:       Pleural effusion      (Ple

## 2019-09-24 NOTE — OPERATIVE REPORT
[address] UNIT  Operative Note     Wyalmitasree Matty Location: OR   [facility] [id number] N [id number]   Admission Date 9/4/2019 Operation Date 9/24/2019   Attending Physician Zena Osullivan MD Operating Physician Akshat Ferguson

## 2019-09-24 NOTE — DIETARY NOTE
ADULT NUTRITION REASSESSMENT     Pt is at moderate nutrition risk. Pt meets moderate malnutrition criteria.       CRITERIA FOR MALNUTRITION DIAGNOSIS:  Criteria for non-severe malnutrition diagnosis: acute illness/injury related to energy intake less megan increased intake. NUTRITION INTERVENTION:  - Meals and snacks: encourage increased po intake as tolerated.     - Medical Food Supplements-stopped supplements per pt request.  - Vitamin and mineral supplements: multivitamin/mineral added   - Feeding ass period, but no aggressive plans. Intake: poor. Recorded as % many meals, but pt only orders 1 item and 1 liquid item per tray.      Intake Meeting Needs: no  Food Allergies: shellfish  Cultural/Ethnic/Gnosticist Preferences: Not Obtained    MEDICATI

## 2019-09-24 NOTE — PROGRESS NOTES
[facility]    Progress Note      Assessment and Plan:     1. Dyspnea with pleural effusion– Klebsiella bacteremia. The patient has recurrent pleural effusion and his dyspnea has recurred. Tolerated Pleurx w/o event.     Recommendations:

## 2019-09-24 NOTE — PAYOR COMM NOTE
--------------  CONTINUED STAY REVIEW-----CLINICALS FROM 9/19 AS REQUESTED----PREVIOUS FAX FROM YOU STATED APPROVED UP TO 9/23      Payor: Kristina WILSON  Subscriber #:  [id number]  Authorization Number: [id number]    Admit date: 9/4/19  Daphnie mack Obstructive jaundice with hyperbilirubinemia  ERCP on 9/6/2019 with stent placement.      sepsis with lactic acidosis  ICU transfer on 9/17/2019. Intensivist consult     Persistent pancreatitis with small 4 mm pseudocyst in the tail  Supportive care.   Mon CL 99  --  102 103 108 106   CO2 33.0*  --  27.0 24.0 25.0 27.0   ALKPHO 466*  --  474*  --  504*  --    AST 95*  --  100*  --  121*  --    ALT 55  --  56  --  56  --    BILT 7.5*  --  6.5*  --  5.8*  --    TP 5.6*  --  5.3*  --  4.9*  --     < > = values 09/18/2019 9.8 (L) 13.0 - 17.5 g/dL Final   09/17/2019 10.8 (L) 13.0 - 17.5 g/dL Final   09/16/2019 10.4 (L) 13.0 - 17.5 g/dL Final   09/15/2019 11.1 (L) 13.0 - 17.5 g/dL Final   09/14/2019 11.0 (L) 13.0 - 17.5 g/dL Final   07/22/2016 13.5 12.5 - 17.0 g/dL CONCLUSION:  1. Suboptimal inspiration. Interval decrease in what is now a small left pleural effusion after thoracentesis. No pneumothorax. Small right pleural effusion.   Mild to moderate bibasilar airspace disease more likely atelectasis and or consol Lungs clear to auscultation. Heart regular rhythm S1-S2 normal.  Abdomen flat soft nontender.   Extremities without edema.     Assessment and Plan:   Obstructive jaundice with hyperbilirubinemia  ERCP on 9/6/2019 with stent placement.      sepsis with lact GFRNAA 106  --  116 127 124 124   CA 7.5*  --  7.3* 7.4* 7.3* 7.4*   ALB 1.5*  --  1.3*  --  1.2*  --      --  136 136 141 140   K 3.2*   < > 3.3* 4.1  4.0 3.6 3.6   CL 99  --  102 103 108 106   CO2 33.0*  --  27.0 24.0 25.0 27.0   ALKPHO 466*  --  4 09/14/2019 30.6 (H) 4.0 - 11.0 x10(3) uL Final            HGB   Date Value Ref Range Status   09/19/2019 10.4 (L) 13.0 - 17.5 g/dL Final   09/18/2019 9.8 (L) 13.0 - 17.5 g/dL Final   09/17/2019 10.8 (L) 13.0 - 17.5 g/dL Final   09/16/2019 10.4 (L) 13.0 - 1 Parisa Castillo is a(n) 48year old male with known metastatic hepatocellular carcinoma who presents with obstructive jaundice. MRI scan suggest extension from his liver to the extrahepatic ducts.   GI consultation for ERCP with stent placement.     P Patient told me directly that he wants to be a DNR at this time. He states he is realistic. Patient is an RN and understands the concept thoroughly. Patient informed he could rescind this order at any time.   I told patient however that this is reasonabl 07/22/2016 60.0   Final            GFR, -American   Date Value Ref Range Status   09/20/2019 135 >=60 Final   09/19/2019 144 >=60 Final   09/18/2019 144 >=60 Final   09/17/2019 146 >=60 Final   09/16/2019 135 >=60 Final   09/15/2019 122 >=60 Final • norepinephrine                               Jeronimo Basilio MD  9/21/2019 9/22  Erick Rodriguez MD   Physician   Internal Medicine   Progress Notes   Signed   Date of Service:  9/22/2019  9:00 AM               [address] Supportive care. Monitor lipase. .     Klebsiella bacteremia  Continue cefepime and vancomycin     Transaminitis  Secondary to #1. Resolved.       Malignant hepatocellular neoplasm metastatic to lung [facility])  Per Dr. Remington Martini.   Prognosis guarded.     Ped < > = values in this interval not displayed.             Recent Labs   Lab 09/17/19  0521            No results for input(s): TROP, CK in the last 168 hours.         Recent Labs   Lab 09/17/19  0945   INR 2.36*         No results for input(s): PGLU          PLT   Date Value Ref Range Status   09/22/2019 408.0 150.0 - 450.0 10(3)uL Final   09/20/2019 415.0 150.0 - 450.0 10(3)uL Final   09/19/2019 388.0 150.0 - 450.0 10(3)uL Final   09/18/2019 331.0 150.0 - 450.0 10(3)uL Final   09/17/2019 357.0 150.0 Blood pressure 134/76, pulse 96, temperature 99 °F (37.2 °C), temperature source Oral, resp. rate 18, height 5' 7\" (1.702 m), weight 147 lb 4.8 oz (66.8 kg), SpO2 93 %.     I/O last 3 completed shifts: In: 1766.3 [P.O.:1000;  I.V.:454.3; IV HLPEHKAPL:243] RDW 17.0* 16.7* 16.4*   NEPRELIM 25.96* 24.32* 18.04*   WBC 29.8* 28.4* 22.5*   .0 415.0 408. 0                Recent Labs   Lab 09/18/19  0425 09/19/19  0505 09/20/19  0711 09/22/19  0650   * 112* 117* 97   BUN 19* 17 15 15   CREATSERUM 0.51* 09/19/2019 29.8 (H) 4.0 - 11.0 x10(3) uL Final   09/18/2019 33.7 (H) 4.0 - 11.0 x10(3) uL Final   09/17/2019 33.6 (H) 4.0 - 11.0 x10(3)  Final   09/16/2019 28.2 (H) 4.0 - 11.0 x10(3)  Final            HGB   Date Value Ref Range Status   09/22/2019 9.4 HYDROmorphone HCl (DILAUDID) 1 MG/ML injection 0.4 mg     Date Action Dose Route User    9/24/2019 0905 Given 0.4 mg Intravenous Daisy Frost RN    9/24/2019 9078 Given 0.4 mg Intravenous Colette Barry RN      HYDROmorphone HCl (DILAUDID) tab 4 mg

## 2019-09-24 NOTE — ANESTHESIA PREPROCEDURE EVALUATION
Anesthesia PreOp Note    HPI:     Merle Acevedo is a 48year old male who presents for preoperative consultation requested by: Phil Restrepo MD    Date of Surgery: 9/4/2019 - 9/24/2019    Procedure(s):  PLEURX CATH INSERTION  Indication: Pleur Oral Tab Take 2 tablets (50 mg total) by mouth every 12 (twelve) hours. (Patient taking differently: Take 50 mg by mouth every 12 (twelve) hours as needed for Itching (hives).   ) Disp: 2 tablet Rfl: 2    Prochlorperazine Maleate (COMPAZINE) 10 mg tablet Ta PRN Faye Rios MD 0.8 mg at 09/23/19 1039    [MAR Hold] HYDROmorphone HCl (DILAUDID) 1 MG/ML injection 0.4 mg 0.4 mg Intravenous Q2H PRN Nina Healy MD 0.4 mg at 09/24/19 0905    [MAR Hold] simethicone (MYLICON) chewable tab 80 mg 80 mg education: Not on file      Highest education level: Not on file    Occupational History      Not on file    Social Needs      Financial resource strain: Not on file      Food insecurity:        Worry: Not on file        Inability: Not on file      Transpo 34.0 09/23/2019    RDW 16.7 (H) 09/23/2019    .0 09/23/2019     Lab Results   Component Value Date     (L) 09/23/2019    K 3.8 09/24/2019     09/23/2019    CO2 26.0 09/23/2019    BUN 13 09/23/2019    CREATSERUM 0.51 (L) 09/23/2019    GLU management. All of the patient's questions were answered to the best of my ability. The patient desires the anesthetic management as planned.   Anabela MAGUIRE  9/24/2019 10:27 AM

## 2019-09-24 NOTE — PLAN OF CARE
NPO for pleurex catheter placement today. Tolerated procedure well. Left sided pleurex dressing clean, old drainage noted and monitoring site. Tolerating diet. IV abx maintained. PO/IVP dilaudid for pain control.  Anticipated discharge to [facility] once iraida anticipated neutropenic period  Description  INTERVENTIONS  - Monitor WBC  - Administer growth factors as ordered  - Implement neutropenic guidelines  Outcome: Progressing     Problem: SAFETY ADULT - FALL  Goal: Free from fall injury  Description  INTERVEN

## 2019-09-24 NOTE — PAYOR COMM NOTE
--------------  CONTINUED STAY REVIEW-----REQUESTING ADDITIONAL DAY 9/25      Payor: Trisha FERNANDEZ EPO  Subscriber #:  [id number]  Authorization Number: [id number]    Admit date: 9/4/19  Admit time: 6624    Admitting Physician: Mary Islas MD  At sepsis with lactic acidosis  ICU transfer on 9/17/2019. Intensivist consult     Pneumonia and pleural effusion   another thoracentesis (9/17/2019) plan for today (9/23/2019).   Pleurx catheter planned 9/24/2019     Persistent pancreatitis with small 4 mm K 3.6   < > 3.4* 3.5 3.4* 3.8      < > 102 106 102  --    CO2 25.0   < > 29.0 26.0 26.0  --    ALKPHO 504*  --  696*  --  780*  --    *  --  137*  --  115*  --    ALT 56  --  87*  --  80*  --    BILT 5.8*  --  6.2*  --  5.5*  --    TP 4.9*  -- 09/23/2019 9.6 (L) 13.0 - 17.5 g/dL Final   09/22/2019 9.4 (L) 13.0 - 17.5 g/dL Final   09/20/2019 11.1 (L) 13.0 - 17.5 g/dL Final   09/19/2019 10.4 (L) 13.0 - 17.5 g/dL Final   09/18/2019 9.8 (L) 13.0 - 17.5 g/dL Final   09/17/2019 10.8 (L) 13.0 - 17.5 g/ 9/23/2019 1607 New Bag 1 g Intravenous (Left PICC Line) Grace Lovett, RN      Heparin Sodium (Porcine) 5000 UNIT/ML injection 5,000 Units     Date Action Dose Route User    9/23/2019 2058 Given 5000 Units Subcutaneous (Left Upper Arm) Saira Al

## 2019-09-24 NOTE — PROGRESS NOTES
[address] [facility] Follow Up    Jae Stage  [id number]  Hospital Day #20  Date of Consult: 09/23/19  Patient seen at: [facility] Room 448    Subjective:      Patient was seen and examined with no one else at the be 80 mg, 80 mg, Oral, Q4H PRN  •  Heparin Sodium (Porcine) 5000 UNIT/ML injection 5,000 Units, 5,000 Units, Subcutaneous, 2 times per day  •  famoTIDine (PEPCID) tab 10 mg, 10 mg, Oral, Q6H PRN  •  Prochlorperazine Maleate (COMPAZINE) tab 10 mg, 10 mg, Oral, likely but is also in the differential.     Dictated by (CST): Alma Oswald MD on 9/23/2019 at 11:25     Approved by (CST): Alma Oswald MD on 9/23/2019 at 11:28          Mri Mrcp (KEG=[id number])    Result Date: 9/24/2019  CONCLUSION:  1.  Complex loculated flu was provided in the operating room. Dictated by (CST): Rajesh Isaacs MD on 9/24/2019 at 10:59     Approved by (CST):  Rajesh Isaacs MD on 9/24/2019 at 10:59            Objective/Physical Exam:     Vital Signs: /77 (BP Location: Right arm) Yes  Healthcare Agent's Name: Percy Yepez his cousin  Healthcare Agent's Phone Number: [phone]     Spiritual needs addressed: Patient/family declined Spiritual Care  Disposition: SNF palliative care    Problem List       Hyperbilirubinemia        Transami

## 2019-09-24 NOTE — CONSULTS
[facility]    Report of Consultation    Jeanice Saint Patient Status:  Inpatient    1965 MRN [id number]   Location [address] UNIT Attending Kalee Lopez MD   Hosp Day # 21 PCP Elmo Blake MD injection 50 mcg 50 mcg Intravenous Q5 Min PRN   HYDROmorphone HCl (DILAUDID) 1 MG/ML injection 0.2 mg 0.2 mg Intravenous Q5 Min PRN   HYDROmorphone HCl (DILAUDID) 1 MG/ML injection 0.4 mg 0.4 mg Intravenous Q5 Min PRN   HYDROmorphone HCl (DILAUDID) 1 MG/M [MAR Hold] Prochlorperazine Maleate (COMPAZINE) tab 10 mg 10 mg Oral Q6H PRN   [MAR Hold] Normal Saline Flush 0.9 % injection 3 mL 3 mL Intravenous PRN   [MAR Hold] ondansetron HCl (ZOFRAN) injection 4 mg 4 mg Intravenous Q6H PRN   [MAR Hold] Metoclopram intolerance, polydipsia or polyuria  Hematologic: Denies easy bruising, bleeding, or clotting disorder  Psychiatric: denies anxiety or depression    Physical Exam:   Blood pressure 132/71, pulse 96, temperature 98 °F (36.7 °C), temperature source Oral, res 09/23/2019    HGB 9.6 (L) 09/23/2019    HCT 28.2 (L) 09/23/2019    .0 09/23/2019    CREATSERUM 0.51 (L) 09/23/2019    BUN 13 09/23/2019     (L) 09/23/2019    K 3.8 09/24/2019     09/23/2019    CO2 26.0 09/23/2019     (H) 09/23/201

## 2019-09-25 NOTE — PLAN OF CARE
Patient: Henrik Coates  MRN: [id number]  : 1965  Allergies:   Radiology Contrast *    ANAPHYLAXIS    Comment:Anaphylaxis shock even with premedication.              Shortness of breath  Shellfish-Derived P*    HIVES      IR MD/ APN Pre-Proced

## 2019-09-25 NOTE — PHYSICAL THERAPY NOTE
PHYSICAL THERAPY TREATMENT NOTE - INPATIENT     Room Number: 448/448-A       Presenting Problem: weakness    Problem List  Principal Problem:    Hyperbilirubinemia  Active Problems:    Transaminitis    Obstructive jaundice    Malignant neoplasm metastatic does the patient currently have. ..  -   Turning over in bed (including adjusting bedclothes, sheets and blankets)?: None   -   Sitting down on and standing up from a chair with arms (e.g., wheelchair, bedside commode, etc.): None   -   Moving from lying on discharge.    Goal #5   Current Status In progress

## 2019-09-25 NOTE — PAYOR COMM NOTE
--------------  CONTINUED STAY REVIEW-----REQUESTING ADDITIONAL DAY 9/25      Payor: Roland WILSON  Subscriber #:  [id number]  Authorization Number: [id number]    Admit date: 9/4/19  Admit time: 9668    Admitting Physician: Faviola Yeh MD  At sepsis with lactic acidosis  ICU transfer on 9/17/2019. Intensivist consult     Pneumonia and pleural effusion   another thoracentesis (9/17/2019) plan for today (9/23/2019).   Pleurx catheter performed 9/24/2019     Persistent pancreatitis with small 4 m TP 6.4  --  5.9*  --          No results for input(s): LIP, LOLI in the last 168 hours.     No results for input(s): TROP, CK in the last 168 hours.         Recent Labs   Lab 09/23/19  0655   INR 1.29*         No results for input(s): PGLU in the last 168 h Date Value Ref Range Status   09/23/2019 448.0 150.0 - 450.0 10(3)uL Final   09/22/2019 408.0 150.0 - 450.0 10(3)uL Final   09/20/2019 415.0 150.0 - 450.0 10(3)uL Final   09/19/2019 388.0 150.0 - 450.0 10(3)uL Final   09/18/2019 331.0 150.0 - 450.0 10(3)uL CONCLUSION:  1. Complex loculated fluid is present in the left upper quadrant. This may reflect developing pancreatic pseudocyst formation, with or without superimposed infection.   2. Pancreatitis involving the pancreatic tail, slightly intervally improved Cecilio Larry MD  9/25/2019                  MEDICATIONS ADMINISTERED IN LAST 1 DAY:  ceFAZolin sodium (ANCEF/KEFZOL) 2 GM/20ML premix IV syringe 2 g     Date Action Dose Route User    9/24/2019 1018 Given 2 g Intravenous MD Dara Walter 9/24/2019 1018 New Bag (none) Intravenous Margy Nicholson MD      lidocaine PF (XYLOCAINE) 1% injection     Date Action Dose Route User    9/24/2019 1041 Given 25 mL (none) MICHAEL Devlin      ondansetron HCl [facility]) injection 4 mg     Date Ac

## 2019-09-25 NOTE — PROGRESS NOTES
Misc. Note    S/P LEFT PLEURX CATHETER PLACEMENT on 9/24  Pt. Sitting at the side of bed. He is awake, alert & oriented x 3, calm and cooperative with no neuro deficits observed.  He states he has mild pain at PleurX insertion site along with mild SOB howev

## 2019-09-25 NOTE — PROGRESS NOTES
[facility]     Gastroenterology Progress Note    Jeanice Saint Patient Status:  Inpatient    1965 MRN [id number]   Location [facility] 4W/SW/SE Attending Kalee Lopez MD   Hosp Day # 21 PCP Elmo Blake MD Patient appears comfortable and in no acute distress  HEENT: Scleral icterus;? Less so. Mucous membranes are moist.  CV- regular rate and rhythm   Lung- Clear to auscultation bilaterally  Abdomen-Non-distended. Bowel sounds are present.   There is no tend Abigail Kramer MD on 9/23/2019 at 11:28          Mri Mrcp (cpt=74181)    Result Date: 9/24/2019  CONCLUSION:  1. Complex loculated fluid is present in the left upper quadrant.  This may reflect developing pancreatic pseudocyst formation, with or without superimpose Monie Reyes MD on 9/24/2019 at 5601 [address] Robertson  9/25/2019

## 2019-09-25 NOTE — PROGRESS NOTES
[facility]    Progress Note    Seth Oliveros Patient Status:  Inpatient    1965 MRN [id number]   Location [facility] Attending Barbara Adams MD   Hosp Day # 21 PCP Sybil Ocasio MD       Subjective:   Kristel Orellana Prognosis guarded. Pedal edema  We will decrease IV rate. Most likely related to hypoalbuminemia.   We will have nutrition see patient    Protein and calorie malnutrition with pedal edema  Supplementation per dietitian    Slow continuous rise in alk kia - 1.30 mg/dL Final   09/18/2019 0.51 (L) 0.70 - 1.30 mg/dL Final   09/17/2019 0.49 (L) 0.70 - 1.30 mg/dL Final     GFR    Date Value Ref Range Status   07/22/2016 60.0  Final     GFR, -American   Date Value Ref Range Status   09/23/2 Units Subcutaneous 2 times per day       Continuous Infusions:   • sodium chloride 20 mL/hr at 09/20/19 2020   • norepinephrine             Xr Chest Pa + Lat Chest (cpt=71046)    Result Date: 9/23/2019  CONCLUSION:   Moderate left and trace right pleural e wall.  Compressive atelectasis in both lower lungs. 2. Common bile duct metallic wall stent. 3. Left PICC line with tip in the SVC.     Dictated by (CST): Anselmo Barthel, MD on 9/24/2019 at 12:11     Approved by (CST): Anselmo Barthel, MD on 9/24/2019 at 12:17

## 2019-09-25 NOTE — PLAN OF CARE
Problem: Patient Centered Care  Goal: Patient preferences are identified and integrated in the patient's plan of care  Description  Interventions:  - What would you like us to know as we care for you?  I am an RN in radiology   - Provide timely, complete, physical needs  - Identify cognitive and physical deficits and behaviors that affect risk of falls.   - [place] fall precautions as indicated by assessment.  - Educate pt/family on patient safety including physical limitations  - Instruct pt to call for a

## 2019-09-25 NOTE — PROGRESS NOTES
[facility]    Progress Note      Assessment and Plan:     1. Dyspnea with pleural effusion– Klebsiella bacteremia. The patient has recurrent pleural effusion and his dyspnea has recurred. Tolerated Pleurx w/o event.   The patient still ha 69 09/25/2019     Chest x-ray– the left lung base is much better inflated.     Luzmaria Diehl MD  Medical Director, Critical Care, [address] [place] [address]  Medical Director, [address] [place] [address]. [phone] M  Pager: [phone]

## 2019-09-25 NOTE — PROGRESS NOTES
[facility]  Progress Note    Seth Oliveros Patient Status:  Inpatient    1965 MRN [id number]   Location [facility] 4W/SW/SE Attending Barbara Adams MD   Hosp Day # 21 PCP Sybil Ocasio MD       Subjective:   Francesca Mullen Chest Ap Portable  (cpt=71045)    Result Date: 9/24/2019  CONCLUSION:  1. Left-sided chest tube. Small right greater than left pleural effusions with slight loculation along the right lateral chest wall. Compressive atelectasis in both lower lungs.  2. Co

## 2019-09-25 NOTE — PLAN OF CARE
Tolerating PO dilaudid for pain. Pleurex drained, tolerated well. IV abx infusing. Tolerating diet. Voiding freely. Anticipated discharge to [facility] when cleared and insurance approved.      Problem: Patient Centered Care  Goal: Patient preferences are id as ordered  - Implement neutropenic guidelines  Outcome: Progressing     Problem: SAFETY ADULT - FALL  Goal: Free from fall injury  Description  INTERVENTIONS:  - Assess pt frequently for physical needs  - Identify cognitive and physical deficits and behav

## 2019-09-25 NOTE — PROGRESS NOTES
[facility] ID PROGRESS NOTE    Marily Resendiz Patient Status:  Inpatient    1965 MRN [id number]   Location [facility] 4W/SW/SE Attending Janet David MD   Hosp Day # 21 PCP Silvio Salas MD     Subjective:  Awake, Tmax 100.7 last night a On 9/6/19 had ERCP with stent placement. Developed post ERCP pancreatitis. Now with fever 101 and increased leukocytosis 31.1. Was started on zosyn on 9/14. BC from 9/14 +Klebsiella. CXR with L perihilar opacity and L pleural effusion. ID consulted.

## 2019-09-25 NOTE — PROGRESS NOTES
[facility]  Palliative Care Follow Up    Michael Poster  [id number]  Hospital Day #21  Date of Consult: 09/23/19  Patient seen at: [facility] Room 448    Subjective:      Patient was seen and examined with no one else at the be 0.5-30 mcg/min, Intravenous, Continuous PRN  •  vancomycin HCl (FIRVANQ) 50 MG/ML oral solution 125 mg, 125 mg, Oral, Daily  •  acetaminophen (TYLENOL) tab 650 mg, 650 mg, Oral, Q6H PRN  •  HYDROmorphone HCl (DILAUDID) 1 MG/ML injection 0.8 mg, 0.8 mg, Int 09/25/2019    ALKPHO 784 (H) 09/25/2019    BILT 5.6 (H) 09/25/2019    TP 6.0 (L) 09/25/2019    AST 95 (H) 09/25/2019    ALT 69 (H) 09/25/2019    PSA 0.8 10/10/2018    MG 2.1 09/16/2019    PHOS 3.3 04/10/2019       Imaging:  Mri Mrcp (swt=[id number])    Result D OF FLUOROGRAPHIC IMAGES: 1  Fluoroscopy and imaging was provided in the operating room. Dictated by (CST): Karli Carr MD on 9/24/2019 at 10:59     Approved by (CST):  Karli Carr MD on 9/24/2019 at 10:59            Objective/Physical Exam: Appointed: Yes  Healthcare Agent's Name: Asya Sotopau his cousin  Healthcare Agent's Phone Number: [phone]     Spiritual needs addressed: Patient/family declined Spiritual Care  Disposition: outpatient palliative care    Problem List       Hyperbilirubinem

## 2019-09-25 NOTE — PROGRESS NOTES
[facility] HOSP - [facility]    Hematology/Oncology   Progress Note    Merle Acevedo Patient Status:  Inpatient    1965 MRN [id number]   Location [facility] 4W/SW/SE Attending Piedad Eaton MD   Hosp Day # 21 PCP Mouna Collier MD morphology and caliber of the intrahepatic biliary radicles are not significantly changed. 4. Redemonstration of multifocal hepatocellular carcinoma with dominant infiltrative lesions of the right hepatic lobe.  Again, there appears to be infiltrative tumo today.  Fevers, bacteremia. Pleural effusion s/p thora. Appreciate pulm id gi input.  hold lenvatinib     Goals of care. Discussed with patient at length he is DNR. He understands that his cancer is not curable.   The majority of his symptoms currently

## 2019-09-26 PROBLEM — E46 MALNUTRITION (HCC): Status: ACTIVE | Noted: 2019-01-01

## 2019-09-26 NOTE — PROGRESS NOTES
[facility]    Progress Note      Assessment and Plan:     1. Dyspnea with pleural effusion– Klebsiella bacteremia. The patient has recurrent pleural effusion and his dyspnea has recurred. Tolerated Pleurx w/o event.   The patient still ha lung base is much better inflated.     Talat Cruz MD  Medical Director, Critical Care, [address] [place] [address]  Medical Director, [address] [place] [address]. [phone] E  Pager: [phone]

## 2019-09-26 NOTE — PROGRESS NOTES
S/P Left PleurX catheter Insertion drainage of pleural effusion 800cc POD #2    Patient is in bed no family at bedside. Denies any concerns or complaints. States does breath better after the pleurX catheter has been inserted. drained yesterday 100cc.     Br

## 2019-09-26 NOTE — TELEPHONE ENCOUNTER
Patient called just with an FYI he will be bringing forms for Dr. Tiana Moseley to fill out he will leave at .

## 2019-09-26 NOTE — PLAN OF CARE
Pt managing pain with PO dilaudid, IV dilaudid before draining the pleurx, 30mL drained from pleurex, two bowel movements today, up independently, IV antibiotics infusing, tele in place    Problem: Patient Centered Care  Goal: Patient preferences are ident ordered  - Implement neutropenic guidelines  Outcome: Progressing     Problem: SAFETY ADULT - FALL  Goal: Free from fall injury  Description  INTERVENTIONS:  - Assess pt frequently for physical needs  - Identify cognitive and physical deficits and behavior

## 2019-09-26 NOTE — PROGRESS NOTES
[facility]    Progress Note    [facility] Patient Status:  Inpatient    1965 MRN [id number]   Location [facility] Attending Navneet Lechuga MD   Hosp Day # 25 PCP Sebastián Riley MD       Subjective:   Marizol Don Malignant hepatocellular neoplasm metastatic to lung [facility])  Per Dr. Sissy Ivan. Prognosis guarded. Pedal edema  We will decrease IV rate. Most likely related to hypoalbuminemia.   We will have nutrition see patient    Protein and calorie malnutrition w Status   09/23/2019 0.51 (L) 0.70 - 1.30 mg/dL Final   09/22/2019 0.46 (L) 0.70 - 1.30 mg/dL Final   09/20/2019 0.60 (L) 0.70 - 1.30 mg/dL Final   09/19/2019 0.51 (L) 0.70 - 1.30 mg/dL Final   09/18/2019 0.51 (L) 0.70 - 1.30 mg/dL Final   09/17/2019 0.49 ( Scheduled Meds:   • lidocaine-menthol  1 patch Transdermal Daily   • cefepime  1 g Intravenous Q8H   • vancomycin HCl  125 mg Oral Daily   • Heparin Sodium (Porcine)  5,000 Units Subcutaneous 2 times per day       Continuous Infusions:   • sodium chl

## 2019-09-26 NOTE — PALLIATIVE CARE NOTE
[facility]  Palliative Care Follow Up    Zoila Iglesias Patient Status:  Inpatient    1965 MRN [id number]   Location [facility] 4W/SW/SE Attending Herman Bear MD   Hosp Day # 25 PCP Cem Lee MD     Date of Con the only other adverse effect he has noticed from the Dilaudid is vivid dreams. He asked about medical cannabis as an adjuvant to his opioid pain medication. He continues to feel a lot of fatigue which he also experienced prior to admission.  He Suzette deferred  Lungs: Normal excursions and effort.   Abdomen: deferred  : deferred  General color: normal  Skin turgor: normal  Neurologic: level of consciousness-alert  Orientation: x 4  Appearance: appropriately groomed  Affect: normal  Judgment: normal. MG/ML Oral Recon Soln Take 2.5 mL (125 mg total) by mouth daily for 21 days. Cefepime HCl 1 g Intravenous Recon Soln Inject 1 g into the vein every 8 (eight) hours for 15 days. lidocaine-menthol 4-1 % External Patch Place 1 patch onto the skin daily. Bacteremia      Goals of Care counseling and discusion     -continue current medical treatments, pt plans to follow up with Dr Radha Marsh in the cancer center for ongoing immunotherapy  treatments        Advance Care Planning:   -Pt is already DNR/DNI - POLST is

## 2019-09-26 NOTE — PLAN OF CARE
Problem: Patient Centered Care  Goal: Patient preferences are identified and integrated in the patient's plan of care  Description  Interventions:  - What would you like us to know as we care for you?  I am an RN in radiology   - Provide timely, complete, physical needs  - Identify cognitive and physical deficits and behaviors that affect risk of falls.   - [place] fall precautions as indicated by assessment.  - Educate pt/family on patient safety including physical limitations  - Instruct pt to call for a

## 2019-09-26 NOTE — PROGRESS NOTES
[facility] HOSP - [facility]    Hematology/Oncology   Progress Note    Jae Stage Patient Status:  Inpatient    1965 MRN [id number]   Location [facility] 4W/SW/SE Attending Javier Bradford MD   Hosp Day # 25 PCP Andriy Aguilar MD noted.   9. Lesser incidental findings as above.     Dictated by (CST): Lydia Persaud MD on 9/24/2019 at 10:57     Approved by (CST): Lydia Persaud MD on 9/24/2019 at 11:20          Xr Chest Ap Portable  (cpt=71045)    Result Date: 9/24/2019  CONCLUSION

## 2019-09-26 NOTE — CM/SW NOTE
Interdisciplinary Rounds: 09/26/19  Admitted: 9/4/2019 LOS: 25  Disciplines in attendance: charge nurse, staff nurse, CM, [address] and discharge plan reviewed. Admitted 9/4/2019 for Obstructive Jaundice. Persistent pancreatitis, PO pain mtg.    K

## 2019-09-26 NOTE — PAYOR COMM NOTE
--------------  CONTINUED STAY REVIEW----REQUESTING ADDITIONAL DAY 9/26      Payor: Teetee WILSON  Subscriber #:  [id number]  Authorization Number: [id number]    Admit date: 9/4/19  Admit time: 1803    Admitting Physician: Edna Castellano MD  Att ERCP on 9/6/2019 with stent placement. Interventional radiology consulted for drainage on Monday.      sepsis with lactic acidosis  ICU transfer on 9/17/2019.   Intensivist consult     Pneumonia and pleural effusion   another thoracentesis (9/17/2019) plan ALB 1.4*  --  1.4*  --  1.4*    138 135*  --   --    K 3.4* 3.5 3.4* 3.8 3.9    106 102  --   --    CO2 29.0 26.0 26.0  --   --    ALKPHO 696*  --  780*  --  784*   *  --  115*  --  95*   ALT 87*  --  80*  --  69*   BILT 6.2*  --  5.5* 09/23/2019 9.6 (L) 13.0 - 17.5 g/dL Final   09/22/2019 9.4 (L) 13.0 - 17.5 g/dL Final   09/20/2019 11.1 (L) 13.0 - 17.5 g/dL Final   09/19/2019 10.4 (L) 13.0 - 17.5 g/dL Final   09/18/2019 9.8 (L) 13.0 - 17.5 g/dL Final   07/22/2016 13.5 12.5 - 17.0 g/dL F Cefepime HCl (MAXIPIME) 1 g in sodium chloride 0.9% 100 mL MBP/add-vantage     Date Action Dose Route User    9/26/2019 0852 New Bag 1 g Intravenous Julio C Booker RN    9/26/2019 0105 New Bag 1 g Intravenous Jeanette Carl, ANNA    9/25/2019 1700 New Bag 1

## 2019-09-26 NOTE — PROGRESS NOTES
Chart reviewed. He is still requiring IV dilaudid for pain control, oral dilaudid also effective but he is sleepy. Supervision for mobility and transfers in room without assidtive device. Supervision with gait without assistive device.     Concern that wi

## 2019-09-27 NOTE — PLAN OF CARE
No acute events overnight. Pain managed with PO dilaudid. Pleurex drain dressing is C/D/I. IV abx given. Up ad solomon to bathroom, voiding freely. Will continue to monitor.      Problem: Patient Centered Care  Goal: Patient preferences are identified and integ ordered  - Implement neutropenic guidelines  Outcome: Not Progressing     Problem: SAFETY ADULT - FALL  Goal: Free from fall injury  Description  INTERVENTIONS:  - Assess pt frequently for physical needs  - Identify cognitive and physical deficits and beha

## 2019-09-27 NOTE — PALLIATIVE CARE NOTE
[facility] - [facility]  Palliative Care Follow Up    Dalton Griffin Patient Status:  Inpatient    1965 MRN [id number]   Location [facility] 4W/SW/SE Attending Wood Rodriguez MD   Hosp Day # 23 PCP Tyree Medina MD     Date of Con temperature source Oral, resp. rate 18, height 5' 7\" (1.702 m), weight 147 lb 4.8 oz (66.8 kg), SpO2 97 %. Body mass index is 23.07 kg/m².   Present Level of pain: pt did not quantify current pain level    Intake and Output:    Intake/Output Summary (Last 5,000 Units, Subcutaneous, 2 times per day  •  famoTIDine (PEPCID) tab 10 mg, 10 mg, Oral, Q6H PRN  •  Prochlorperazine Maleate (COMPAZINE) tab 10 mg, 10 mg, Oral, Q6H PRN  •  Normal Saline Flush 0.9 % injection 3 mL, 3 mL, Intravenous, PRN  •  ondansetron status: DNR  Have advanced directives been discussed with patient or healthcare power of : Yes        Healthcare Agent Appointed: Yes  Healthcare Agent's Name: Bestdeepa Aquino (cousin)  Healthcare Agent's Phone Number: [phone]          [facility] patient. Our service will follow up with patient on Monday, if still hospitalized. For any arising palliative care needs over the weekend, please page Dr. Lesley Casiano.     Temo DARDEN, [place]-BC, [facility], [id number]  9/27/2019  10:17 AM

## 2019-09-27 NOTE — HOME CARE LIAISON
Met with patient at the bedside. Patient is agreeable to [facility].  Explained to patient, it is recommended that someone is available to assist in learning IV abx, pleurx, and possible biliary drain management at home because the adia

## 2019-09-27 NOTE — PROGRESS NOTES
[facility]    Hematology/Oncology   Progress Note    Viral Briones Patient Status:  Inpatient    1965 MRN [id number]   Location [facility] 4W/SW/SE Attending Jimbo Major MD   Hosp Day # 21 PCP Niya Dempsey MD Appreciate palliative consult. Dr. Aissatou Barfield covering this weekend if any questions.      Pedrito August MD

## 2019-09-27 NOTE — PAYOR COMM NOTE
--------------  CONTINUED STAY REVIEW----REQUESTING ADDITIONAL DAY 9/27      Payor: Sis WILSON  Subscriber #:  [id number]  Authorization Number: [id number]    Admit date: 9/4/19  Admit time: 6322    Admitting Physician: Clarissa Hernandez MD  Att ERCP on 9/6/2019 with stent placement. Interventional radiology consulted for drainage on Monday.      sepsis with lactic acidosis  ICU transfer on 9/17/2019.   Intensivist consult     Pneumonia and pleural effusion   another thoracentesis (9/17/2019) plan K 3.5 3.4* 3.8 3.9  --     102  --   --   --    CO2 26.0 26.0  --   --   --    ALKPHO  --  780*  --  784* 812*   AST  --  115*  --  95* 98*   ALT  --  80*  --  69* 70*   BILT  --  5.5*  --  5.6* 4.8*   TP  --  5.9*  --  6.0* 6.0*         No results f 09/23/2019 9.6 (L) 13.0 - 17.5 g/dL Final   09/22/2019 9.4 (L) 13.0 - 17.5 g/dL Final   09/20/2019 11.1 (L) 13.0 - 17.5 g/dL Final   09/19/2019 10.4 (L) 13.0 - 17.5 g/dL Final   07/22/2016 13.5 12.5 - 17.0 g/dL Final            PLT   Date Value Ref Range S Abdomen: soft, nontender, No hepatomegaly.   No splenomegaly    Extremity: pulses positive, no edema  Neuro: VERNON x 4  Psych: appropriate mood, affect  MSK: no bone tenderness     Imaging:  Mri Mrcp (VLT=[id number])     Result Date: 9/24/2019  CONCLUSION: CONCLUSION:           FLUOROSCOPY TIME:       16.4 sec # OF FLUOROGRAPHIC IMAGES:       1  Fluoroscopy and imaging was provided in the operating room. Dictated by (CST): Tommy Parker MD on 9/24/2019 at 10:59     Approved by (CST):  Tommy Parker, 9/27/2019 0804 Given 0.4 mg Intravenous Arnaldo Slaughter RN      HYDROmorphone HCl (DILAUDID) tab 4 mg     Date Action Dose Route User    9/27/2019 1132 Given 4 mg Oral Arnaldo Slaughter RN    9/26/2019 2120 Given 4 mg Oral Gianna Villa RN      vanc

## 2019-09-27 NOTE — PROGRESS NOTES
[facility]     Progress Note        Gerhardt Gander Patient Status:  Inpatient    1965 MRN [id number]   Location [facility] 4W/SW/SE Attending Erick Rodriguez MD   Hosp Day # 23 PCP Jeronimo Basilio MD       Subjective: Intravenous Q6H PRN   Metoclopramide HCl (REGLAN) injection 10 mg 10 mg Intravenous Q8H PRN       Continuous Infusions:   • sodium chloride 20 mL/hr at 09/20/19 0713   • norepinephrine         Physical Exam  Constitutional: no acute distress  HEENT: PERRL

## 2019-09-27 NOTE — PROGRESS NOTES
[facility] ID PROGRESS NOTE    Jae Stage Patient Status:  Inpatient    1965 MRN [id number]   Location [facility] 4W/SW/SE Attending Javier Bradford MD   Hosp Day # 23 PCP Andriy Aguilar MD     Subjective:  Awake, afebrile.  Drained pleur started on zosyn on 9/14. BC from 9/14 +Klebsiella. CXR with L perihilar opacity and L pleural effusion. ID consulted.      # Klebsiella bacteremia--> 2/2 BC +.   Suspect biliary sepsis   -Repeat blood cx NGTD   -MRCP with moderate ductal dilation, small

## 2019-09-27 NOTE — PROGRESS NOTES
[facility]    Progress Note    Jae Stage Patient Status:  Inpatient    1965 MRN [id number]   Location [facility] Attending Javier Bradford MD   Hosp Day # 21 PCP Andriy Aguilar MD       Subjective:   Gwen Sheriff metastatic to lung [facility])  Per Dr. Edgar Eden. Prognosis guarded. Pedal edema  We will decrease IV rate. Most likely related to hypoalbuminemia.   We will have nutrition see patient    Protein and calorie malnutrition with pedal edema  Supplementation p 09/20/2019 0.60 (L) 0.70 - 1.30 mg/dL Final   09/19/2019 0.51 (L) 0.70 - 1.30 mg/dL Final   09/18/2019 0.51 (L) 0.70 - 1.30 mg/dL Final   09/17/2019 0.49 (L) 0.70 - 1.30 mg/dL Final     GFR    Date Value Ref Range Status   07/22/2016 60.0 vancomycin HCl  125 mg Oral Daily   • Heparin Sodium (Porcine)  5,000 Units Subcutaneous 2 times per day       Continuous Infusions:   • sodium chloride 20 mL/hr at 09/20/19 4713   • norepinephrine                         José Miguel Collins MD  9/27/2019

## 2019-09-27 NOTE — PROGRESS NOTES
Misc. Note    S/P LEFT PLEURX CATHETER PLACEMENT on 9/24  Pt. In bed. He states he continues to have mild pain at PleurX insertion site but is getting use to the drain. He denies SOB. He states 30cc was drained from PleurX yesterday.   No visitors at bedsid

## 2019-09-27 NOTE — HOSPICE RN NOTE
Met with pt to discuss hospice options and goals of care. Pt very accepting of decline and feels this hospital admission is the start of the end. Explained to him the different levels of hospice care and what services are provided in each.  Explained what i

## 2019-09-27 NOTE — PHYSICAL THERAPY NOTE
PHYSICAL THERAPY TREATMENT NOTE - INPATIENT     Room Number: 448/448-A       Presenting Problem: weakness    Problem List  Principal Problem:    Hyperbilirubinemia  Active Problems:    Transaminitis    Obstructive jaundice    Malignant neoplasm metastatic Static Sitting: Normal  Dynamic Sitting: Good           Static Standing: Good  Dynamic Standing: Fair +    ACTIVITY TOLERANCE                         O2 WALK                  AM-PAC '6-Clicks' INPATIENT SHORT FORM - BASIC MOBILITY  How much difficult assist   Goal #3   Current Status 300 ft with SBA with RW   Goal #4 Patient will negotiate 12 stairs/one curb w/ assistive device and CG   Goal #4   Current Status 12 stairs with 1 HR   Goal #5 Patient to demonstrate independence with home activity/exercis

## 2019-09-28 NOTE — OCCUPATIONAL THERAPY NOTE
Attempted to see pt 2x this date. Pt declining politely 1rst attempt, and able to see pt on 2nd attempt. Pt was exiting bathroom, I'ly, with family present. Pt stating able to transfer to toilet without assist, and tolerated standing at sink to wash hands.

## 2019-09-28 NOTE — PLAN OF CARE
Patient is alert and oriented x4. RA. Tele #73. Heparin subQ for DVT prophylaxis. Voids freely. Patient had a formed BM today. Dilaudid (PO and IV) for pain PRN. Up independently. PleurX to be drained tomorrow. Plan is for biliary tube placement Monday. period  Description  INTERVENTIONS  - Monitor WBC  - Administer growth factors as ordered  - Implement neutropenic guidelines  Outcome: Progressing     Problem: SAFETY ADULT - FALL  Goal: Free from fall injury  Description  INTERVENTIONS:  - Assess pt freq

## 2019-09-28 NOTE — PROGRESS NOTES
Pulmonary/Critical Care Follow Up Note    HPI:   Queenie Kaminski is a 48year old male with Patient presents with:  Jaundice (gastrointestinal, hematologic)      PCP Efrem Shrestha MD  Admission Attending Sadi Tomlinson 15 Day #24    Mild 99.4 °F (37.4 °C), temperature source Oral, resp. rate 16, height 5' 7\" (1.702 m), weight 147 lb 4.8 oz (66.8 kg), SpO2 94 %.     Intake/Output Summary (Last 24 hours) at 9/28/2019 1145  Last data filed at 9/28/2019 0700  Gross per 24 hour   Intake 122 ml

## 2019-09-28 NOTE — PLAN OF CARE
Patient is alert and oriented. Patient has PICC line in Left arm. RN is to drain Pleurex today. Patient on room air. Patient has telemetry monitoring in place. Patient is continuing to take Cefepime and Vancomycin.  Patient's pain has been managed with oral comfort measures as appropriate  - Advance diet as tolerated, if ordered  - Obtain nutritional consult as needed  - Evaluate fluid balance  Outcome: Progressing     Problem: RISK FOR INFECTION - ADULT  Goal: Absence of fever/infection during anticipated ne

## 2019-09-28 NOTE — PROGRESS NOTES
[facility]    Progress Note    Viral Briones Patient Status:  Inpatient    1965 MRN [id number]   Location [facility] 4W/SW/SE Attending Jimbo Major MD   Hosp Day # 24 PCP Niya Dempsey MD       Subjective:   Adina Hill performed     Deconditioning  Physiatrist states patient's condition too poor for [facility]. Patient refuses subacute rehab. Will go home with VN.   Results:     Lab Results   Component Value Date    WBC 22.1 (H) 09/27/2019    HGB 8.1 (L) 09/27/2019    HCT

## 2019-09-29 NOTE — PLAN OF CARE
Pt had increased pain overnight. Improved this morning after frequent analgesia overnight.     Problem: Patient Centered Care  Goal: Patient preferences are identified and integrated in the patient's plan of care  Description  Interventions:  - What would FALL  Goal: Free from fall injury  Description  INTERVENTIONS:  - Assess pt frequently for physical needs  - Identify cognitive and physical deficits and behaviors that affect risk of falls.   - [place] fall precautions as indicated by assessment.  - Educ

## 2019-09-29 NOTE — PROGRESS NOTES
[place] [facility] - [facility]     Progress Note        Roz Way Patient Status:  Inpatient    1965 MRN [id number]   Location [facility] 4W/SW/SE Attending Mary Islas MD   Hosp Day # 25 PCP José Miguel Collins MD       Subjective: ondansetron HCl (ZOFRAN) injection 4 mg 4 mg Intravenous Q6H PRN   Metoclopramide HCl (REGLAN) injection 10 mg 10 mg Intravenous Q8H PRN       Continuous Infusions:   • sodium chloride 20 mL/hr at 09/20/19 0713       Physical Exam  Constitutional: no acu

## 2019-09-29 NOTE — PROGRESS NOTES
[facility]    Progress Note    Michael Poster Patient Status:  Inpatient    1965 MRN [id number]   Location [facility] 4W/SW/SE Attending Power Curiel MD   Hosp Day # 25 PCP Di Chavarria MD       Subjective:   Alta Laird hypoalbuminemia.  We will have nutrition see patient     Protein and calorie malnutrition with pedal edema  Supplementation per dietitian     Slow continuous rise in alk phos  Repeat MRCP performed     Deconditioning    Results:     Lab Results   Component

## 2019-09-29 NOTE — PLAN OF CARE
Patient's states his pain is better controlled today. Patient resting through out day. IV antibiotics infusing. Safety measures in place.    Problem: Patient Centered Care  Goal: Patient preferences are identified and integrated in the patient's plan of car guidelines  Outcome: Progressing     Problem: SAFETY ADULT - FALL  Goal: Free from fall injury  Description  INTERVENTIONS:  - Assess pt frequently for physical needs  - Identify cognitive and physical deficits and behaviors that affect risk of falls.   - I

## 2019-09-30 NOTE — PROGRESS NOTES
[facility]    Progress Note    Parisa Castillo Patient Status:  Inpatient    1965 MRN [id number]   Location [facility] 4W/SW/SE Attending Lionel Lazcano MD   Hosp Day # 32 PCP Trisha Galvan MD        Subjective: Ap Portable  (cpt=71045)    Result Date: 9/28/2019  CONCLUSION:   Slight worsening left pleural effusion. Improved aeration right lung base.   Dictated by (CST): Gurmeet Neff MD on 9/28/2019 at 18:20     Approved by (CST): Gurmeet Neff MD on 9/2

## 2019-09-30 NOTE — PROGRESS NOTES
[facility]    Progress Note    Griffin Hernandes Patient Status:  Inpatient    1965 MRN [id number]   Location [facility] Attending Navneet Lechuga MD   Hosp Day # 32 PCP Sebastián Riley MD       Subjective:   Marizol Don to lung [facility])  Per Dr. Harris Wong. Prognosis guarded. Pedal edema  We will decrease IV rate. Most likely related to hypoalbuminemia.   We will have nutrition see patient    Protein and calorie malnutrition with pedal edema  Supplementation per dietitia Final   09/19/2019 144 >=60 Final   09/18/2019 144 >=60 Final   09/17/2019 146 >=60 Final     GFR NON-   Date Value Ref Range Status   07/22/2016 60.0  Final     GFR, Non-   Date Value Ref Range Status   09/23/2019 124 >=60 base.  Dictated by (CST): Janie Rosas MD on 9/28/2019 at 18:20     Approved by (CST): Janie Rosas MD on 9/28/2019 at 18:22                  Sybil Ocasio MD  9/30/2019

## 2019-09-30 NOTE — PHYSICAL THERAPY NOTE
PHYSICAL THERAPY TREATMENT NOTE - INPATIENT     Room Number: 448/448-A       Presenting Problem: weakness    Problem List  Principal Problem:    Hyperbilirubinemia  Active Problems:    Transaminitis    Obstructive jaundice    Malignant neoplasm metastatic standing up from a chair with arms (e.g., wheelchair, bedside commode, etc.): None   -   Moving from lying on back to sitting on the side of the bed?: None   How much help from another person does the patient currently need. ..   -   Moving to and from a be

## 2019-09-30 NOTE — PROGRESS NOTES
[facility] ID PROGRESS NOTE    Kofi Wesley Patient Status:  Inpatient    1965 MRN [id number]   Location [facility] [place] 4W/SW/SE Attending Dioni Busby MD   Hosp Day # 32 PCP Praneeth Cochran MD     Subjective:  Awake, afebrile.  Had more abdo zosyn on 9/14. BC from 9/14 +Klebsiella. CXR with L perihilar opacity and L pleural effusion. ID consulted.      # Klebsiella bacteremia--> 2/2 BC +.   Suspect biliary sepsis   -Repeat blood cx NGTD   -MRCP with moderate ductal dilation, small ?pseudocys

## 2019-09-30 NOTE — PAYOR COMM NOTE
--------------  CONTINUED STAY REVIEW----REQUESTING ADDITIONAL DAY 9/28, 9/29 and 9/30  Payor: Balaji WILSON  Subscriber #:  [id number]  Authorization Number: [id number]    Admit date: 9/4/19  Admit time: 7612    Admitting Physician: Yovana Rodriguez Continue cefepime and vancomycin.  PO antibiotics after procedure Monday per ID     Transaminitis  Secondary to #1. Dafne Mchugh.       Malignant hepatocellular neoplasm metastatic to lung [facility])  Per Dr. Sameer Garcia.  Prognosis guarded.     Pedal edema  We will Queenie Kaminski is a(n) 48year old male had increased epigastric pain last night, covered by additional morphine on top of dilaudid, better now.   Wants to stay on dilaudid.     Objective:   Blood pressure 110/67, pulse 89, temperature 98.7 °F (37.1 ° Lab Results   Component Value Date     WBC 22.1 (H) 09/27/2019     HGB 8.1 (L) 09/27/2019     HCT 23.9 (L) 09/27/2019     .0 09/27/2019     CREATSERUM 0.51 (L) 09/23/2019     BUN 13 09/23/2019      (L) 09/23/2019     K 3.9 09/25/2019     CL 10 Sepsis and lactic acidosis requiring ICU transfer on 9/17/2019 with Klebsiella pneumonia bacteremia. Transferred to the medical floor on 9/18/2019     Patient would like to increase his diet.   He is eating three quarters of his meals.     Hepatobiliary  Patient told me directly that he wants to be a DNR at this time. He states he is realistic. Patient is an RN and understands the concept thoroughly. Patient informed he could rescind this order at any time.   I told patient however that this is reasonabl 09/19/2019 124 >=60 Final   09/18/2019 124 >=60 Final   09/17/2019 127 >=60 Final      WBC   Date Value Ref Range Status   09/27/2019 22.1 (H) 4.0 - 11.0 x10(3) uL Final   09/26/2019 25.8 (H) 4.0 - 11.0 x10(3) uL Final   09/23/2019 25.9 (H) 4.0 - 11.0 x10( Cefepime HCl (MAXIPIME) 1 g in sodium chloride 0.9% 100 mL MBP/add-vantage     Date Action Dose Route User    9/30/2019 0920 New Bag 1 g Intravenous Daniella Solo    9/30/2019 0130 New Bag 1 g Intravenous Cayla Graff RN    9/29/2019 1841 New Bag 1

## 2019-09-30 NOTE — PLAN OF CARE
Problem: Patient Centered Care  Goal: Patient preferences are identified and integrated in the patient's plan of care  Description  Interventions:  - What would you like us to know as we care for you?  I am an RN in radiology   - Provide timely, complete, physical needs  - Identify cognitive and physical deficits and behaviors that affect risk of falls.   - [place] fall precautions as indicated by assessment.  - Educate pt/family on patient safety including physical limitations  - Instruct pt to call for a

## 2019-09-30 NOTE — TELEPHONE ENCOUNTER
Pt calling to get an update on having his travel expense form that needs to be filled out. Pls call pt.  Thank you

## 2019-09-30 NOTE — PROGRESS NOTES
[facility]  Progress Note      Clarence Gaucher Patient Status:  Inpatient    1965 MRN [id number]   Location [facility] 4W/SW/SE Attending Edna Castellano MD   Hosp Day # 32 PCP Luis Parson MD       Subjective:   C/O p

## 2019-09-30 NOTE — PLAN OF CARE
Pt A/O, up ad solomon. Medium fall risk. Safety precautions in place, non-skid socks on, bed in lowest position, personal possessions and call light within reach--calls appropriately. Pt walking around the room frequently today, heparin for DVT.    Pleurex fluid balance  Outcome: Progressing     Problem: RISK FOR INFECTION - ADULT  Goal: Absence of fever/infection during anticipated neutropenic period  Description  INTERVENTIONS  - Monitor WBC  - Administer growth factors as ordered  - Implement neutropenic

## 2019-09-30 NOTE — PALLIATIVE CARE NOTE
[facility]  Palliative Care Follow Up    Danilo Power Patient Status:  Inpatient    1965 MRN [id number]   Location [facility] 4W/SW/SE Attending Christian Lang MD   Hosp Day # 32 PCP Rosaura Tran MD     Date of Con Senokot-S, or Miralax. Tashia Flores denies feeling constipated at this time. We also talked about strategies for managing his pain once he returns home.  Given his typical IVP and PO Dilaudid use here in the hospital, I feel that a quantity of 60 tablets is appro deferred  Lungs: Normal excursions and effort.   Abdomen: deferred  : deferred  General color: normal  Skin turgor: normal  Neurologic: level of consciousness-alert  Orientation: x 4  Appearance: appropriately groomed  Affect: normal  Judgment: normal. MG/0.1ML Nasal Liquid 4 mg by Nasal route as needed. If patient remains unresponsive, repeat dose in other nostril 2-5 minutes after first dose. vancomycin HCl 50 MG/ML Oral Recon Soln Take 2.5 mL (125 mg total) by mouth daily for 21 days.    Cefepime HCl Healthcare Agent Appointed: Yes  Healthcare Agent's Name: Tavon Philip (cousin)  Healthcare Agent's Phone Number: [phone]          Hospitalization:  DNR/DNR  Trial period of medically administered means of nutrition including feeding tubes    Palli

## 2019-10-01 NOTE — PALLIATIVE CARE NOTE
[facility]  Palliative Care Follow Up    [place] Patient Status:  Inpatient    1965 MRN [id number]   Location [facility] 4W/SW/SE Attending Bruna Augustin MD   Hosp Day # 32 PCP Rosi Owusu MD     Date of Con nausea or vomiting. As above, he denies constipation. Goals of Care counseling and discussion/Advance Care Planning counseling and discussion:  The plan is for discharge to home possibly today.      SW has already made a referral to [facility] (DILAUDID) tab 4 mg, 4 mg, Oral, Q4H PRN  •  Normal Saline Flush 0.9 % injection 10 mL, 10 mL, Intravenous, PRN  •  lidocaine-menthol 4-1 % 1 patch, 1 patch, Transdermal, Daily  •  0.9% NaCl infusion, , Intravenous, Continuous  •  vancomycin HCl [facility]) Date    INR 1.17 10/01/2019    PTT 37.1 (H) 09/23/2019       Chemistry:  Lab Results   Component Value Date    CREATSERUM 0.49 (L) 09/30/2019    BUN 13 09/30/2019     09/30/2019    K 4.1 10/01/2019     09/30/2019    CO2 28.0 09/30/2019    GLU 1 induced constipation   -Not a current problem; monitor   -Pt to use Colace, Senokot-S, and/or Miralax OTC to prevent constipation        Diposition:   -Plan for home with home health and home palliative care services; SW already made referral to [facility]

## 2019-10-01 NOTE — PROGRESS NOTES
[facility] HOSP - [facility]    Progress Note    Clarence Gaucher Patient Status:  Inpatient    1965 MRN [id number]   Location [facility] Attending Edna Castellano MD   Hosp Day # 32 PCP Luis Parson MD       Subjective:   Ramana Cruz will have nutrition see patient    Protein and calorie malnutrition with pedal edema  Supplementation per dietitian    Slow continuous rise in alk phos  Repeat MRCP performed    Jodie Campoverde states to intensive.     DNR  Patient told me directl AMERICAN   Date Value Ref Range Status   07/22/2016 60.0  Final     GFR, -American   Date Value Ref Range Status   09/30/2019 145 >=60 Final   09/23/2019 143 >=60 Final   09/22/2019 150 >=60 Final   09/20/2019 135 >=60 Final   09/19/2019 144 >=60 Fi Infusions:   • sodium chloride 20 mL/hr at 09/20/19 7730                       Sebastián Riley MD  10/01/2019

## 2019-10-01 NOTE — PROGRESS NOTES
[facility] ID PROGRESS NOTE    Caesar Louis Patient Status:  Inpatient    1965 MRN [id number]   Location [facility] 4W/SW/SE Attending Rocky Bhardwaj MD   Hosp Day # 32 PCP Dawit Gregg MD     Subjective:  Awake, Tmax 100.7 early this A increased leukocytosis 31.1. Was started on zosyn on 9/14. BC from 9/14 +Klebsiella. CXR with L perihilar opacity and L pleural effusion. ID consulted.      # Klebsiella bacteremia--> 2/2 BC +.   Suspect biliary sepsis   -Repeat blood cx NGTD   -MRCP wi

## 2019-10-01 NOTE — OCCUPATIONAL THERAPY NOTE
OCCUPATIONAL THERAPY TREATMENT NOTE - INPATIENT        Room Number: 448/448-A           Presenting Problem: Nausea, jaundice. H/O cancer with mets.     Problem List  Principal Problem:    Hyperbilirubinemia  Active Problems:    Transaminitis    Obstructive go home    OBJECTIVE  Precautions: Bed/chair alarm    WEIGHT BEARING RESTRICTION  Weight Bearing Restriction: None                PAIN ASSESSMENT  Rating: (Not quantified)  Location: generalized discomfort  Management Techniques: Repositioning        ACTIV

## 2019-10-01 NOTE — PLAN OF CARE
Patient with pain last evening, dilaudid IV x1 followed by dilaudid PO. Asleep through much of the night. Low grade temperature this morning, will continue to monitor.   Problem: Patient Centered Care  Goal: Patient preferences are identified and integrat risk of falls.   - [place] fall precautions as indicated by assessment.  - Educate pt/family on patient safety including physical limitations  - Instruct pt to call for assistance with activity based on assessment  - Modify environment to reduce risk of i

## 2019-10-02 NOTE — PAYOR COMM NOTE
--------------  DISCHARGE REVIEW    Payor: Saad FERNANDEZ EPO  Subscriber #:  [id number]  Authorization Number: [id number]    Admit date: 9/4/19  Admit time:  4776  Discharge Date: 10/1/2019  8:01 PM     Admitting Physician: Rocky Bhardwaj MD  Attend

## 2019-10-02 NOTE — PLAN OF CARE
IV dilaudid given once prior to pleurex draining. Otherwise uses PO dilaudid prn. Pt tolerated pleurex draining well. PICC line removed per order without complications. Vitals stable, has been afebrile. Cleared for discharge tonight.    Discharge instru Discharge     Problem: RISK FOR INFECTION - ADULT  Goal: Absence of fever/infection during anticipated neutropenic period  Description  INTERVENTIONS  - Monitor WBC  - Administer growth factors as ordered  - Implement neutropenic guidelines  Outcome: Adequ

## 2019-10-02 NOTE — TELEPHONE ENCOUNTER
Pt was discharged last evening from [facility] and was told to see Dr. Ashok Houston in office in one month for follow up. No appointments available. Please call.

## 2019-10-04 NOTE — TELEPHONE ENCOUNTER
Called and spoke with pt, scheduled him on 10/29/19 @ 12:15pm with [facility]. Pt in agreement and verbalized understanding, no further questions at this time.

## 2019-10-07 NOTE — TELEPHONE ENCOUNTER
From: Doreen Davis  To: MARY Eddy  Sent: 10/4/2019 6:26 PM CDT  Subject: Prescription Question    Hello! I'm trying to reach Dr. Dickson Coats RN but his name isn't in the list, even if it says I will be able to message my PCP.  I just

## 2019-10-07 NOTE — TELEPHONE ENCOUNTER
Dr. Joyce Whitley please advise on request for CXR order. Last CXR done 9/28/19. Indications: Post left pleurex drain.  Hx lung ca  Pt has f/u appointment 10/29 for hospital f/u    Future Appointments   Date Time Provider Dee Covington   10/16/2019 11:00 AM M

## 2019-10-07 NOTE — TELEPHONE ENCOUNTER
RN, please facilitate chest x-ray on the same day that the patient sees me in the office with his next appointment. Diagnosis is follow-up pleural effusion.

## 2019-10-11 NOTE — PROGRESS NOTES
Mr. Hali Denson returns today for f/u concerning obstructive jaundice and hepatocellular carcinoma. He is feeling poorly with weakness, poor appetiite, and some pruritis. Mr. Hali Denson is currently living at home while his brother is staying with him.     Physi

## 2019-10-16 NOTE — PROGRESS NOTES
Hand off report given to General Ascencio. Dr. Alvina Garcia spoke with patient and updated with plan of care. Vital signs remain stable.

## 2019-10-16 NOTE — PROGRESS NOTES
Pt took 4 doses of prednisone 50mg at 8am, 2pm and 10pm yesterday and 4am today.  He also took Benadryl 50mg today at 1:14pm.

## 2019-10-16 NOTE — PROGRESS NOTES
Patient missed 2 predisone doses today prior to procedure so discussed with IR and will cancel case today given severe anaphylaxis with prior contrast.    Will admit patient for IV steroids and reschedule case

## 2019-10-17 NOTE — H&P
[facility]    PATIENT'S NAME: Yudith Poser   ATTENDING PHYSICIAN: Jace Mercer.  Kayla Tai MD   PATIENT ACCOUNT#:   [id number]    LOCATION:  [address] [place] [address] [address] #:   [id number]       YOB: 1965  ADMISSION DATE:       10/ a nonsmoker with no recreational drugs or alcohol intake. Patient is single, lives in [place], and is a radiological nurse. REVIEW OF SYSTEMS:  The patient denies frequent headaches or any recent change in vision.   No dysphagia, hemoptysis, hematemesi

## 2019-10-17 NOTE — CM/SW NOTE
Readmission note:  Saw pt at bedside for readmission. Per pt he was to take PO steroids prior to biliary tube placement procedure that was scheduled out pt on 10/16. Procedure was cancelled.     Pt is severely allergic to iodine and coded in past.   Pe

## 2019-10-17 NOTE — PHYSICAL THERAPY NOTE
Attempted to see patient for physical therapy evaluation. Per patient and RN, patient independent and has no need for skilled physical therapy. Will D/C from physical therapy caseload. RN aware and agreeable.

## 2019-10-17 NOTE — PLAN OF CARE
Patient A/O x 4. On room air. With left pleurex drain - dressing CDI. Pt voiding freely per urinal. Pain managed with PRN Dilaudid. Remote tele ordered - patient refused, but agreed to put in on before procedure on Friday. Dr. Lulú Mccord notified.  Up corie acuña pain and pain management  - Manage/alleviate anxiety  - Utilize distraction and/or relaxation techniques  - Monitor for opioid side effects  - Notify MD/LIP if interventions unsuccessful or patient reports new pain  - Anticipate increased pain with activit

## 2019-10-17 NOTE — PLAN OF CARE
Problem: Patient Centered Care  Goal: Patient preferences are identified and integrated in the patient's plan of care  Description  Interventions:  - Provide timely, complete, and accurate information to patient/family  - Incorporate patient and family k resources  Description  INTERVENTIONS:  - Identify barriers to discharge w/pt and caregiver  - Include patient/family/discharge partner in discharge planning  - Arrange for needed discharge resources and transportation as appropriate  - Identify discharge

## 2019-10-18 NOTE — ANESTHESIA PROCEDURE NOTES
Airway  Urgency: elective    Airway not difficult    General Information and Staff    Patient location during procedure: OR  Anesthesiologist: Roderick Many, DO  Performed: anesthesiologist     Indications and Patient Condition  Indications for airway lauren

## 2019-10-18 NOTE — PROGRESS NOTES
[facility]    Progress Note    Jeanice Saint Patient Status:  Inpatient    1965 MRN [id number]   Location [facility] 4W/SW/SE Attending Ignacia Gibbs MD   Hosp Day # 2 PCP Elmo Blake MD       Subjective:   Kush Perera 1.30 mg/dL Final   09/20/2019 0.60 (L) 0.70 - 1.30 mg/dL Final     GFR    Date Value Ref Range Status   07/22/2016 60.0  Final     GFR, -American   Date Value Ref Range Status   10/17/2019 126 >=60 Final   10/08/2019 123 >=60 Final Subcutaneous 2 times per day       Continuous Infusions:   • sodium chloride 10 mL/hr at 10/16/19 2138   • EPINEPHrine (ADRENALIN) infusion     • norepinephrine                         Hector Rosen MD  10/18/2019

## 2019-10-18 NOTE — ANESTHESIA POSTPROCEDURE EVALUATION
Patient: Clarence Gaucher    Procedure Summary     Date:  10/18/19 Room / Location:  [facility]    Anesthesia Start:  9768 Anesthesia Stop:  0658    Procedure:  IR BILIARY TUBE INSERTION EXCHANGE CHECK Diagnosis:       Aviva Grant

## 2019-10-18 NOTE — PROCEDURES
[facility]  Procedure Note    Mozell Fossa Patient Status:  Inpatient    1965 MRN [id number]   Location [facility] Attending Ranulfo Nathan, [facility] Day # 0 PCP Cem Lee MD     Proce

## 2019-10-18 NOTE — PLAN OF CARE
Problem: Patient Centered Care  Goal: Patient preferences are identified and integrated in the patient's plan of care  Description  Interventions:  - What would you like us to know as we care for you? I used to work here.    - Provide timely, complete, an PLANNING  Goal: Discharge to home or other facility with appropriate resources  Description  INTERVENTIONS:  - Identify barriers to discharge w/pt and caregiver  - Include patient/family/discharge partner in discharge planning  - Arrange for needed dischar Will continue to monitor.

## 2019-10-18 NOTE — PLAN OF CARE
No acute events overnight. Patient's pain managed with PO dilaudid. IV solu-medrol given as scheduled. NPO after midnight maintained. Patient up ad solomon, voiding freely. Plan for biliary tube placement with IR today.      Problem: Patient Centered Care  Goal Notify MD/LIP if interventions unsuccessful or patient reports new pain  - Anticipate increased pain with activity and pre-medicate as appropriate  Outcome: Progressing     Problem: DISCHARGE PLANNING  Goal: Discharge to home or other facility with appropr

## 2019-10-18 NOTE — CONSULTS
Inpatient oncology consultation    Date: 10/17/2019    Chief Complaint:  [address] obstructive jaundice    History of Present Illness:  Cancer  77-year-old male with a remote history of hepatitis A being evaluated by Oncology for a liver mass and elevated AFP w premedication for contrast dye allergy. Patient states he again has noticed change in his skin color. Denies of fevers or chills.   Denies any focal neurological deficits denies any chest pain or shortness of breath        Performance Status:  ECOG 1  Pas together: Not on file        Attends [employer] service: Not on file        Active member of club or organization: Not on file        Attends meetings of clubs or organizations: Not on file        Relationship status: Not on file      Intimate partner viole 10/01/19  0922 10/08/19  1352 10/17/19  1159   WBC 21.3* 13.8* 21.5*   HGB 8.4* 9.8* 8.9*   .0 551.0* 476.0*   NE  --  11.21*  --    NEUT 83  --   --          Lab Results   Component Value Date     (H) 10/17/2019    BUN 20 (H) 10/17/2019    B

## 2019-10-18 NOTE — ANESTHESIA PREPROCEDURE EVALUATION
Anesthesia PreOp Note    HPI:     Michael Webster is a 48year old male who presents for preoperative consultation requested by: * No surgeons listed *    Date of Surgery: 10/18/2019    * No procedures listed *  Indication: * No pre-op diagnosis enter mg in sodium chloride 0.9% 250 mL infusion, 1-10 mcg/min, Intravenous, Continuous, Ivett Kulkarni MD  norepinephrine (LEVOPHED) 4 mg/250 ml premix infusion, 0.5-30 mcg/min, Intravenous, Continuous, Demetri White MD  methylPREDNISolone Sodium Succ (Solu-ME Inability: Not on file      Transportation needs:        Medical: Not on file        Non-medical: Not on file    Tobacco Use      Smoking status: Never Smoker      Smokeless tobacco: Never Used    Substance and Sexual Activity      Alcohol use: No      Romaine CREATSERUM 0.69 (L) 10/17/2019     (H) 10/17/2019    CA 8.2 (L) 10/17/2019     Lab Results   Component Value Date    INR 1.53 (H) 10/17/2019       Vital Signs:   There is no height or weight on file to calculate BMI.   vitals were not taken for this

## 2019-10-19 NOTE — PROGRESS NOTES
[place] [facility] - [facility]    Progress Note    [facility] Patient Status:  Inpatient    1965 MRN [id number]   Location [facility] 4W/SW/SE Attending No att. providers found   Hosp Day # 3 PCP Marcelina Morocho MD       Subjective:   N biliary stent on 10/18     IVP contrast anaphylactic reaction  -Premedication administered intravenously due to inability to take p.o.  Tolerated procedure well and now reports normal p.o. intake    Disposition: Patient to be discharged home today      [address]

## 2019-10-19 NOTE — PLAN OF CARE
Problem: Patient Centered Care  Goal: Patient preferences are identified and integrated in the patient's plan of care  Description  Interventions:  - What would you like us to know as we care for you? I used to work here.    - Provide timely, complete, an for Discharge     Problem: DISCHARGE PLANNING  Goal: Discharge to home or other facility with appropriate resources  Description  INTERVENTIONS:  - Identify barriers to discharge w/pt and caregiver  - Include patient/family/discharge partner in discharge p reach. Calls appropriately. Frequent rounding. Will be d/c'd home later.

## 2019-10-19 NOTE — PLAN OF CARE
Problem: Patient Centered Care  Goal: Patient preferences are identified and integrated in the patient's plan of care  Description  Interventions:  - What would you like us to know as we care for you? I used to work here.    - Provide timely, complete, an PLANNING  Goal: Discharge to home or other facility with appropriate resources  Description  INTERVENTIONS:  - Identify barriers to discharge w/pt and caregiver  - Include patient/family/discharge partner in discharge planning  - Arrange for needed dischar Safety measures in place, call light within reach, will continue to monitor.

## 2019-10-19 NOTE — PROGRESS NOTES
[facility] HOSP - [facility]    Hematology/Oncology   Progress Note    Clarence Gaucher Patient Status:  Inpatient    1965 MRN [id number]   Location [facility] 4W/SW/SE Attending Jen Nicholson MD   Hosp Day # 2 PCP Luis Parson MD

## 2019-10-22 PROBLEM — G89.3 CANCER ASSOCIATED PAIN: Status: ACTIVE | Noted: 2019-01-01

## 2019-10-22 PROBLEM — A41.89 SEPSIS DUE TO OTHER ETIOLOGY (HCC): Status: ACTIVE | Noted: 2019-01-01

## 2019-10-22 NOTE — PAYOR COMM NOTE
--------------  DISCHARGE REVIEW    Payor: Neda Saunders [facility]  Subscriber #:  [id number]  Authorization Number: [id number]    Admit date: 10/16/19  Admit time:  1720  Discharge Date: 10/19/2019  1:19 PM     Admitting Physician: MD Jose M Winn Assessment      Hepatocellular carcinoma ([facility] [address]  -Per Dr. Nichols Current     Obstructive jaundice due to cancer [facility])  -Status post IR placement of transhepatic biliary stent on 10/18     IVP contrast anaphylactic reaction  -Premedication administered intravenously [facility]     Anesthesia Start:  7968 Anesthesia Stop:  0968     Procedure:  IR BILIARY TUBE INSERTION EXCHANGE CHECK Diagnosis:       Hepatocellular carcinoma ([facility] [address]      Hepatocellular carcinoma ([facility] [address]           Scheduled Prov periportal and retroperitoneal lymph nodes     He started Y 90 treatment end of April 2018  Since there is questionable evidence of extrahepatic disease he also started on sorafenib in April 2018.   He has had hand-foot syndrome on sorafenib     He had new       Family History:        Family History   Problem Relation Age of Onset   • Colon Cancer Paternal Grandfather           Social History:    Social History       Socioeconomic History      Marital status: Single      Spouse name: Not on file      Numbe Self-Exams: Not Asked    Social History Narrative      Not on file         Current Medications:  No current outpatient medications on file.   lenvatinib on hold  Allergies:     Radiology Contrast *    ANAPHYLAXIS    Comment:Anaphylaxis shock even with preme Plan:  70-year-old male with a remote history of hepatitis A following with medical oncology for metastatic hepatocellular carcinoma with pulmonary metastasis. Treatment as outlined above.   He has pulmonary metastasis  –Recurrent malignant obstructive jau

## 2019-10-22 NOTE — ED PROVIDER NOTES
Patient Seen in: [facility] AND [facility] Emergency Department      History   Patient presents with:  Abdomen/Flank Pain (GI/)    Stated Complaint: abdominal pain    HPI    PT is 47 yo M who p/w severe abdominal pain that started after eating pastry at home i wheezes or retractions  Ab: biliary drain with +drainage. No distension. Diffusely tender with no rebound or guarding  Extremities: FROM of all extremities, no cyanosis/clubbing/edema  Neuro: CN intact, normal speech.  5/5 motor strength in all extremities, Normal, RA    Cardiac Monitor: Pulse Readings from Last 1 Encounters:  10/22/19 : 106  , sinus     Radiology findings:     Cxr: Unchanged small left pleural effusion with associated consolidation. Mild central vascular congestion.   CT a/p: Moderately incr

## 2019-10-22 NOTE — H&P
[facility]    PATIENT'S NAME: Kathy Marino   ATTENDING PHYSICIAN: Ruddy Burns MD   PATIENT ACCOUNT#:   [id number]    LOCATION:  [place] [address]  MEDICAL RECORD #:   [id number]       YOB: 1965  ADMISSION DATE: headaches or any recent change in vision. No dysphagia, hemoptysis, hematemesis, melena, hematochezia. No chest pains, palpitations, or shortness of breath. No dysuria, although his urine is much darker.   No one-sided weakness, heat or cold intolerance,

## 2019-10-22 NOTE — PROGRESS NOTES
[address] [facility] Dosing Service  Antibiotic Dosing    Danilo Power is a 48year old male for whom pharmacy is dosing Zosyn for treatment of  intra-abdominal infection.      Allergies: is allergic to radiology contrast iodinated dyes and shellfish-derive

## 2019-10-22 NOTE — ED INITIAL ASSESSMENT (HPI)
The patient who reports a hx of liver cancer and a biliary stent complains of new onset of abdominal pain starting at 2330 this past evening.

## 2019-10-22 NOTE — PLAN OF CARE
Patient came up from ER today alert and oriented x4. Hospice on consult. Sepsis protocol in place (see orders). Patient ambulating frequently and independently, general diet. Urinalysis with reflex still needed, pt on IV abx and fluids.  Pt refuses continuo

## 2019-10-22 NOTE — CM/SW NOTE
SW received MDO for hospice.  ALPHONSE [facility] and made [facility].     [facility] [phone]

## 2019-10-22 NOTE — CM/SW NOTE
MSW met with the pt to talk about hospice POC and pt's interest in. He is ready to die, but is keeping strong for his family. He would like hospice team to FU wed 10/23/19 at 10am.The plan is for him to be going home with hospice.   HORTENSIA Warren  Res

## 2019-10-22 NOTE — DISCHARGE SUMMARY
[facility]    Discharge Summary    Merle Acevedo Patient Status:  Inpatient    1965 MRN [id number]   Location [facility] Attending No att. providers found   [facility] Day # 0 PCP Mouna Collier MD

## 2019-10-22 NOTE — PROGRESS NOTES
[facility] HOSP - [facility]  Progress Note    Merle Acevedo Patient Status:  Emergency    1965 MRN [id number]   Location [address] Attending Wendy Dang MD   Hosp Day # 0 PCP Mouna Collier MD       Subject

## 2019-10-23 NOTE — CONSULTS
Inpatient oncology consultation    Date: 10/22/2019    Chief Complaint:  [address] obstructive jaundice    History of Present Illness:  Cancer  51-year-old male with a remote history of hepatitis A being evaluated by Oncology for a liver mass and elevated AFP w that progressive jaundice patient states he again has noticed change in his skin color. Denies of fevers or chills.   Denies any focal neurological deficits denies any chest pain or shortness of breath        Performance Status:  ECOG 1  Past Medical Histo file        Attends [employer] service: Not on file        Active member of club or organization: Not on file        Attends meetings of clubs or organizations: Not on file        Relationship status: Not on file      Intimate partner violence:        Fear 24.1*   HGB 8.8*   .0   NE 21.14*         Lab Results   Component Value Date     (H) 10/22/2019    BUN 21 (H) 10/22/2019    BUNCREA 25.0 (H) 10/22/2019    CREATSERUM 0.84 10/22/2019    ANIONGAP 6 10/22/2019    GFRNAA 101 10/22/2019    GFRAA 1

## 2019-10-23 NOTE — CONSULTS
[address]  Report of GI Consultation    Queenie Barbarasharifa Patient Status:  Inpatient    1965 MRN [id number]   Location [facility] 4W/SW/SE Attending Gina Frazier MD   Hosp Day # 0 PCP Efrem Shrestha MD     Date of Admi in the ED, tachycardia. Initial labs showed WBC 24,100, alkaline phosphatase 1130, total bilirubin 18.1. Repeat noncontrast CT scan (history of anaphylaxis reaction IV contrast) shows severe ? perigastric fat stranding, external/internal biliary drain the GI service. Approaching end-of-life with widely metastatic disease, possible peritoneal carcinomatosis. Would continue aggressive medical management as above, consideration for end-of-life Hospice care.   Would expect limited improvement on fluids and 5,000 Units, Subcutaneous, 2 times per day  acetaminophen (TYLENOL) tab 650 mg, 650 mg, Oral, Q6H PRN  docusate sodium (COLACE) cap 100 mg, 100 mg, Oral, BID  PEG 3350 (MIRALAX) powder packet 17 g, 17 g, Oral, Daily PRN  magnesium hydroxide (MILK OF MAGNES equally round and reactive to light; +++ temporal wasting; no thyromegaly or cervical lymphadenopathy  PULM: Lungs clear to auscultation anteriorly  CV: RRR  ABD: Normoactive bowel sounds; soft/nondistended; clear yellow bile in percutaneous drain bag  EXT through a common bile duct stent. 4. Worsening left para-aortic/left periadrenal retroperitoneal lymphadenopathy. 5. Peripancreatic/mesenteric lymphadenopathy is also noted.   6. Extensive nodularity of the mesentery could reflect peritoneal carcinomatosi

## 2019-10-23 NOTE — PAYOR COMM NOTE
--------------  ADMISSION REVIEW     Payor: Ngozi WILSON  Subscriber #:  [id number]  Authorization Number: [id number]    Admit date: 10/22/19  Admit time: 1003       Admitting Physician: Navneet Lechuga MD  Attending Physician:  Navneet Lechuga, retractions  Ab: biliary drain with +drainage. No distension. Diffusely tender with no rebound or guarding  Extremities: FROM of all extremities, no cyanosis/clubbing/edema  Neuro: CN intact, normal speech.  5/5 motor strength in all extremities, no focal d RA    Cardiac Monitor: Pulse Readings from Last 1 Encounters:  10/22/19 : 106  , sinus     Radiology findings:     Cxr: Unchanged small left pleural effusion with associated consolidation. Mild central vascular congestion.   CT a/p: Moderately increased ga [id number]    SIYBQTEX:  PAESW 11 13 VPID 1  MEDICAL RECORD #:   [id number]       YOB: 1965  ADMISSION DATE:       10/22/2019    HISTORY AND PHYSICAL EXAMINATION    CHIEF COMPLAINT:  Severe epigastric and generalized midabdominal pain. palpitations, or shortness of breath. No dysuria, although his urine is much darker. No one-sided weakness, heat or cold intolerance, or recent change in weight.       PHYSICAL EXAMINATION:    GENERAL:  Patient is a thin [place] male in mild to moderate 10/23/2019 1015 Given 4 mg Oral Nathaniel Simone, RN    10/23/2019 0523 Given 4 mg Oral Gianna Villa, RN    10/22/2019 2307 Given 4 mg Oral Gianna Allen [facility]    10/22/2019 1810 Given 4 mg Oral Stephania Schultz      Piperacillin Sod-Tazobactam So (Rachana Palacio) counseling/discussion  Hospice to meet with patient today.           Results:           Recent Labs   Lab 10/17/19  1159 10/22/19  0331   RBC 2.88* 2.80*   HGB 8.9* 8.8*   HCT 26.7* 26.0*   MCV 92.7 92.9   MCH 30.9 31.4   MCHC 33.3 33.8   RDW 17.8* 17.8* 107 >=60 Final   09/30/2019 126 >=60 Final   09/23/2019 124 >=60 Final            WBC   Date Value Ref Range Status   10/22/2019 24.1 (H) 4.0 - 11.0 x10(3) uL Final   10/17/2019 21.5 (H) 4.0 - 11.0 x10(3) uL Final   10/08/2019 13.8 (H) 4.0 - 11.0 x10(3) uL previously seen fluid collections is suboptimal on this noncontrast study. 3. There is an external/internal biliary drainage catheter which has been placed and extends into the duodenum.  Notably, the distal aspect of this catheter appears to perforated th 10/22/2019 at 6:48     Approved by (CST):  Aurelio Vega MD on 10/22/2019 at 6:52                     Marcelina Morocho MD  10/23/2019            Electronically signed by Dennis Elaine MD at 10/23/2019  9:16 AM     Date/Time Temp Pulse Resp BP SpO2 Weigh

## 2019-10-23 NOTE — PLAN OF CARE
Patient's pain being managed with PO and IV dilaudid. Per patient request wanted IVF at lower rate d/t swelling of his legs. IV abx given. Afebrile overnight. HR improved. Biliary drain in place, patient emptying on his own. Up ad solomon.  Plan for hospice tamiko effects  - Notify MD/LIP if interventions unsuccessful or patient reports new pain  - Anticipate increased pain with activity and pre-medicate as appropriate  Outcome: Progressing     Problem: GASTROINTESTINAL - ADULT  Goal: Minimal or absence of nausea an

## 2019-10-23 NOTE — PROGRESS NOTES
[facility] HOSP - [facility]    Hematology/Oncology   Progress Note    Luba Starr Patient Status:  Inpatient    1965 MRN [id number]   Location [facility] 4W/SW/SE Attending Zena Osullivan MD   Hosp Day # 1 PCP Avtar De La Fuente MD for metastatic disease. 9. Extensive posttreatment changes of the markedly cirrhotic liver.  Multifocal hepatic neoplastic disease is not well assessed on this noncontrast exam.  10. Moderate left larger than right pleural effusions and associated compress discussed.   Appropriate resources were reviewed and discussed with the pateint and family.   Sabrina Mayberry MD

## 2019-10-23 NOTE — HOSPICE RN NOTE
Met with pt at bedside to discuss hospice plan. Consents signed by pt. Discussed DME and meds needed at home. Pt prefers Dilaudid PO as opposed to morphine. Pt also takes Compazine and Zofran. Only wants to use these meds for comfort.  Explained discharged

## 2019-10-23 NOTE — CONSULTS
[facility]  [id number]  Hospital Day #1  Date of Consult: 10/23/19  Patient seen at: [facility]    Reason for Consultation:      Consult requested by Dr Jake Proctor for evaluat ENDOSCOPY   • ENDOSCOPIC RETROGRADE CHOLANGIOPANCREATOGRAPHY (ERCP) N/A 9/6/2019    Performed by Ryan Noyola MD at [facility] N/A 6/11/2019    Performed by Edyta Jorgensen MD at [address] [place] [address]   • [address] CATH INSERTION hydroxide (MILK OF MAGNESIA) 400 MG/5ML suspension 30 mL, 30 mL, Oral, Daily PRN  •  bisacodyl (DULCOLAX) rectal suppository 10 mg, 10 mg, Rectal, Daily PRN  •  FLEET ENEMA (FLEET) 7-19 GM/118ML enema 133 mL, 1 enema, Rectal, Once PRN  •  influenza vaccine seen fluid collections is suboptimal on this noncontrast study. 3. There is an external/internal biliary drainage catheter which has been placed and extends into the duodenum.  Notably, the distal aspect of this catheter appears to perforated through a com at 6:48     Approved by (CST):  Leela Sifuentes MD on 10/22/2019 at 6:52              Review of Systems:      Palliative Care symptom needs assessed:   Symptoms(s): fatigue  Dyspnea: at times when he over exerts himself  Cough: none  Nausea: none  Pain: c services, he wanted to speak with me and tell me his story. He wants to choose hospice services at home at this time. He is sure of his decision.     Advance Care Planning counseling and discussion:   Patient's preference about sharing medical information: physical examination and >50% was spent counseling and coordinating care. Goals of care are established. Will sign off. Thank you for consulting our palliative care services to assist with Yinka's care. Joseph Thompson MD   10/23/2019  12:16 PM

## 2019-10-24 NOTE — PLAN OF CARE
Problem: Patient/Family Goals  Goal: Patient/Family Long Term Goal  Description  Patient's Long Term Goal: to go home    Interventions:  - IVF  - IV abx  - Pain management   - See additional Care Plan goals for specific interventions   Outcome: Progressi vomiting  Description  INTERVENTIONS:  - Maintain adequate hydration with IV or PO as ordered and tolerated  - Nasogastric tube to low intermittent suction as ordered  - Evaluate effectiveness of ordered antiemetic medications  - Provide nonpharmacologic c

## 2019-10-24 NOTE — HOSPICE RN NOTE
Met with pt to discuss discharge plan. Pt wanted to go home sooner rather than later. Explained to pt that this is fine and the admission will be done later in the day. Pt agreeable to POC. Spoke with Courtney regarding POC.

## 2019-10-24 NOTE — PAYOR COMM NOTE
--------------  CONTINUED STAY REVIEW    Payor: Tunde Boucher [facility]  Subscriber #:  [id number]  Authorization Number: [id number]    Admit date: 10/22/19  Admit time: 6312    Admitting Physician: Gregoria Coronado MD  Attending Physician:  Gregoria Coronado, 3.3*     --  106 100   CO2 28.0  --  27.0 27.0   ALKPHO 1,361*  --  1,596* 1,130*   *  --  270* 105*   *  --  202* 138*   BILT 12.6*  --  12.4* 18.1*   TP 6.9  --  7.6 7.4             Recent Labs   Lab 10/22/19  0331            N 09/30/2019 7.7 (L) 13.0 - 17.5 g/dL Final   09/27/2019 8.1 (L) 13.0 - 17.5 g/dL Final   07/22/2016 13.5 12.5 - 17.0 g/dL Final            PLT   Date Value Ref Range Status   10/22/2019 334.0 150.0 - 450.0 10(3)uL Final   10/17/2019 476.0 (H) 150.0 - 450. (Room air) — JL   10/23/19 0524 98.3 °F (36.8 °C) 107 22 106/73 100 % — None (Room air) — EA   10/22/19 2052 98.5 °F (36.9 °C) 106 20 118/75 96 % — None (Room air) — EA   10/22/19 1556 98 °F (36.7 °C) 129Abnormal  20 111/76 96 % — None (Room air) — EN   10

## 2019-10-24 NOTE — PLAN OF CARE
Patient alert and oriented x 4. VSS. Room air. Voiding. Pain controlled with PO dilaudid. Ambulating by self. Patient has remained free from falls. Bed in lowest position. Call light and belongings within reach. Plan to discharge home today with hospice. Notify MD/LIP if interventions unsuccessful or patient reports new pain  - Anticipate increased pain with activity and pre-medicate as appropriate  Outcome: Progressing     Problem: GASTROINTESTINAL - ADULT  Goal: Minimal or absence of nausea and vomiting

## 2019-10-24 NOTE — PLAN OF CARE
Problem: Patient/Family Goals  Goal: Patient/Family Long Term Goal  Description  Patient's Long Term Goal: to go home    Interventions:  - IVF  - IV abx  - Pain management   - See additional Care Plan goals for specific interventions   Outcome: Adequate Minimal or absence of nausea and vomiting  Description  INTERVENTIONS:  - Maintain adequate hydration with IV or PO as ordered and tolerated  - Nasogastric tube to low intermittent suction as ordered  - Evaluate effectiveness of ordered antiemetic medicati

## 2019-10-24 NOTE — PROGRESS NOTES
[facility]    Progress Note    Queenie Kaminski Patient Status:  Inpatient    1965 MRN [id number]   Location [facility] 4W/SW/SE Attending Gina Frazier MD   Hosp Day # 2 PCP Efrem Shrestha MD       Subjective:   Deborah Bob 270* 105*   *  --  202* 138*   BILT 12.6*  --  12.4* 18.1*   TP 6.9  --  7.6 7.4       Recent Labs   Lab 10/22/19  0331          No results for input(s): TROP, CK in the last 168 hours.     Recent Labs   Lab 10/17/19  1159   INR 1.53*       No 10/22/2019 334.0 150.0 - 450.0 10(3)uL Final   10/17/2019 476.0 (H) 150.0 - 450.0 10(3)uL Final   10/08/2019 551.0 (H) 150.0 - 450.0 10(3)uL Final   10/01/2019 399.0 150.0 - 450.0 10(3)uL Final   09/30/2019 390.0 150.0 - 450.0 10(3) Final   09/27/2019

## 2019-10-29 NOTE — PAYOR COMM NOTE
--------------  DISCHARGE REVIEW    Payor: Reford Boeck [employer]  Subscriber #:  [id number]  Authorization Number: [id number]    Admit date: 10/22/19  Admit time:  7226  Discharge Date: 10/24/2019  1:25 PM     Admitting Physician: Nina Healy MD  Att

## 2019-11-04 NOTE — DISCHARGE SUMMARY
[facility]    PATIENT'S NAME: Mian Clancy   ATTENDING PHYSICIAN: Manjeet Bean.  Milad Pulliam MD   PATIENT ACCOUNT#:   [id number]    LOCATION:  [address] [place] [address] #:   [id number]       YOB: 1965  ADMISSION DATE:       0 obstruction. The patient underwent sphincterotomy and a 10 mm x 8 mm uncovered metal stent was placed in his common duct. The patient had slow advancement of his diet following procedure.   His bilirubin was monitored closely as well as his alkaline phosp

## 2019-11-05 PROBLEM — R10.9 INTRACTABLE ABDOMINAL PAIN: Status: ACTIVE | Noted: 2019-01-01

## 2019-11-05 PROBLEM — G89.3 CANCER RELATED PAIN: Status: ACTIVE | Noted: 2019-01-01

## 2019-11-05 PROBLEM — C22.9 LIVER CANCER (HCC): Status: ACTIVE | Noted: 2019-01-01

## 2019-11-05 NOTE — ED PROVIDER NOTES
Patient Seen in: [facility] Emergency Department      History   Patient presents with:  Pain    Stated Complaint: RUQ pain    HPI    53-year-old male with past medical history significant for stage IV hepatocellular carcinoma, prediabetes,, common Temporal   SpO2 98 %   O2 Device None (Room air)       Current:/81   Pulse 115   Temp 98.3 °F (36.8 °C) (Temporal)   Resp 16   Ht 170.2 cm (5' 7\")   Wt 61.2 kg   SpO2 99%   BMI 21.14 kg/m²         Physical Exam    Physical Exam   Constitutional: AAO All other components within normal limits   CBC W/ DIFFERENTIAL - Abnormal; Notable for the following components:    WBC 23.6 (*)     RBC 2.93 (*)     HGB 9.1 (*)     HCT 28.1 (*)     RDW-SD 62.7 (*)     RDW 18.0 (*)     .0 (*)     Neutrophil Abs diagnosis)  Cancer related pain    Disposition:  Admit  11/5/2019 11:44 am    Follow-up:  No follow-up provider specified.       Medications Prescribed:  Current Discharge Medication List                   Present on Admission  Date Reviewed: 9/4/2019

## 2019-11-05 NOTE — PROGRESS NOTES
met with patient and mother at bedside. Patient was very weak at time of visit. Patient was very kind and asked for  to come back after he rested. 11/05/19 4093   Clinical Encounter Type   Visited With Patient; Family   Routine Visit I

## 2019-11-05 NOTE — HOSPICE RN NOTE
Patient came from home with [facility]. He went to the ER  Due to severe pain. Patient was admitted as GIP to manage his severe pain with Dilaudid drip. Patient is having nausea and vomiting and is getting IV Zofran. POC was discussed with Dr Maximino Apodaca

## 2019-11-05 NOTE — ED INITIAL ASSESSMENT (HPI)
Hetaher Morris arrives with c/o severe pain.  He last took dilaudid at 4 AM  Patient reports pain is at site of his biliary drainage bag    Patient with hx of hepatocellular carcinoma

## 2019-11-05 NOTE — CONSULTS
Our service was asked by Dr. Priscilla Buck to see the patient for uncontrolled symptoms. Notes in Epic reviewed and I spoke with ANNA Dyer from [facility].     Patient was discharged to home recently with hospice services through [facility]

## 2019-11-05 NOTE — CM/SW NOTE
MSW met with pt in ER. His pain needs are greater at this time ,than can be managed at home. ER RN consulted with , RN stated  putting order for bed.  team notified.  James Burnham MSTAISHA  [facility]  [phone]

## 2019-11-06 NOTE — PLAN OF CARE
Problem: Patient/Family Goals  Goal: Patient/Family Long Term Goal  Description  Patient's Long Term Goal: To be comfortable.     Interventions:  - management of pain and nausea/vomiting  - See additional Care Plan goals for specific interventions  Outcom injury  Description  INTERVENTIONS:  - Assess pt frequently for physical needs  - Identify cognitive and physical deficits and behaviors that affect risk of falls.   - [place] fall precautions as indicated by assessment.  - Educate pt/family on patient sa and cultural and spiritual values  - Provide emotional support, including active listening and acknowledgement of concerns of patient and caregivers  - Reduce environmental stimuli, as able  - Instruct patient/family in relaxation techniques, as appropriat treatment and care  Description  INTERVENTIONS:  - Determine when there are differences between patient's view, family's view, and healthcare provider's view of condition  - Facilitate patient and family articulation of goals for care  - Help patient and f Create safety for patient through empathic presence and non-judgmental listening  - Encourage patient to explore his/her values, beliefs and/or spiritual images and practices  - Encourage use of breath work, imagery, meditation, relaxation, reiki to ease d

## 2019-11-06 NOTE — CM/SW NOTE
MSW met with pt and friend in pt room. The pt is having sharp breakthrough pain,this writer spoke  with FN, informed her that he  Stated he is willing to forgo some ability to ambulate for pain relief.   HORTENSIA Bustos  [facility]  [phone]

## 2019-11-06 NOTE — H&P
[facility]    PATIENT'S NAME: Sasha Bee   ATTENDING PHYSICIAN: Jaylon Miranda MD   PATIENT ACCOUNT#:   [id number]    LOCATION:  [address] [place] [address] RECORD #:   [id number]       YOB: 1965  ADMISSION DATE:       1 change in vision. No dysphagia, hemoptysis, hematemesis, melena, hematochezia. No chest pains, palpitations, or shortness of breath. No dysuria or change in frequency. No one-sided weakness, heat or cold intolerance, or recent change in weight.       [facility]

## 2019-11-06 NOTE — PROGRESS NOTES
[facility]    Progress Note    Beryl Lebron Patient Status:  Inpatient    1965 MRN [id number]   Location [facility] 4W/SW/SE Attending Sheree Price MD   Hosp Day # 1 PCP Debbie Quintero MD       Subjective:   Edna Espinal 10/17/2019 0.69 (L) 0.70 - 1.30 mg/dL Final   10/08/2019 0.73 0.70 - 1.30 mg/dL Final   09/30/2019 0.49 (L) 0.70 - 1.30 mg/dL Final     GFR    Date Value Ref Range Status   07/22/2016 60.0  Final     GFR, -American   Date Value Ref Infusions:   • HYDROmorphone HCl 0.2 mg/hr (11/05/19 1655)                       Corey Reilly MD  11/6/2019

## 2019-11-06 NOTE — PROGRESS NOTES
Visited with patient, no family present. Patient reported being at peace with illness. Said he did not want anyone to feel sorry for him. Patient was very conversational intentional.  Patient is former employee of [address] [place], [place] [address] worked as nurse in ED and Radiology.

## 2019-11-06 NOTE — HOSPICE RN NOTE
GIP DAY 2 Patient is alert and oriented he was in bed but told me he sat at side of bed earlier  He is till having abdominal pain even with the Dilaudid drip at 0.2mg/hour  He is not ready to go home at this time  POC was discussed with Basilio Almonte and with Marcus Boswell

## 2019-11-06 NOTE — PLAN OF CARE
Patient still alert and oriented, but drowsy. Pain managed with Dilaudid gtt running @ 1, No titration needed overnight. Bilary drain intact, output monitored. Denies any nausea. Urinal at bedside, refusing a nix at this time. Will continue to monitor. physical limitations  - Instruct pt to call for assistance with activity based on assessment  - Modify environment to reduce risk of injury  - Provide assistive devices as appropriate  - Consider OT/PT consult to assist with strengthening/mobility  - Encou for spiritual and psychosocial needs and initiate Spiritual Care or Behavioral Health consult as needed  Outcome: Progressing     Problem: DEATH & DYING  Goal: Pt/Family communicate acceptance of impending death and feel psychological comfort and peace  Raymond Cerda pros/cons of alternative solutions  - Provide information as requested by patient/family  - Respect patient/family right to receive or not to receive information  - Serve as a liaison between patient and family and health care team  - Initiate Consults fro provide healing  - Encourage use of cultural and spiritual celebrations and rituals  - Facilitate discussion that helps patient sort out spiritual concerns  - Help patient identify where meaning/hope/comfort & strength are in his/her life  - Refer patient

## 2019-11-06 NOTE — PLAN OF CARE
Patient states he feels \"much better than yesterday. \" Still complains of right sided abdominal pain. Dilaudid drip running at 0.2 mg/hr. Patient ambulating freely in the room. Patient states he does not feel ready to go home yet. Tolerating general diet. Monitor for opioid side effects  - Notify MD/LIP if interventions unsuccessful or patient reports new pain  - Anticipate increased pain with activity and pre-medicate as appropriate  Outcome: Progressing     Problem: SAFETY ADULT - FALL  Goal: Free from fa interaction with staff  Outcome: Progressing     Problem: COPING  Goal: Pt/Family able to verbalize concerns and demonstrate effective coping strategies  Description  INTERVENTIONS:  - Assist patient/family to identify coping skills, available support syst family system and community, or matthew/spiritual traditions  - Initiate Spiritual Care consult as needed  Outcome: Progressing     Problem: DECISION MAKING  Goal: Pt/Family able to effectively weigh alternatives and participate in decision making related to Refer patient to formal counseling and/or to matthew community for further support work  Outcome: Progressing  Goal: Pt feels balance and connection with higher power that empowers the self during times of loss, guilt and fear  Description  INTERVENTIONS:  -

## 2019-11-07 NOTE — PROGRESS NOTES
Patient was sitting up in bed during visit with . Patent is accepting of illness and reports healthy coping strategies.    provided emotional support and supportive presence by empathic listening, life-story sharing, and encouraging the pat

## 2019-11-07 NOTE — PLAN OF CARE
Patient still alert and oriented, but drowsy. Episode of sharp pain to R lower abdomen near insertion of Biliary drain. IV Push of dilaudid given and gtt titrated up to 0.4mg/hr. Bilary drain intact, output monitored. Denies any nausea.  Urinal at bedside, assessment.  - Educate pt/family on patient safety including physical limitations  - Instruct pt to call for assistance with activity based on assessment  - Modify environment to reduce risk of injury  - Provide assistive devices as appropriate  - Consider patient/family in relaxation techniques, as appropriate  - Assess for spiritual and psychosocial needs and initiate Spiritual Care or Behavioral Health consult as needed  Outcome: Progressing     Problem: DEATH & DYING  Goal: Pt/Family communicate acceptan articulation of goals for care  - Help patient and family identify pros/cons of alternative solutions  - Provide information as requested by patient/family  - Respect patient/family right to receive or not to receive information  - Serve as a liaison [place] work, imagery, meditation, relaxation, reiki to ease distress and provide healing  - Encourage use of cultural and spiritual celebrations and rituals  - Facilitate discussion that helps patient sort out spiritual concerns  - Help patient identify where maureen

## 2019-11-07 NOTE — PLAN OF CARE
Problem: Patient/Family Goals  Goal: Patient/Family Long Term Goal  Description  Patient's Long Term Goal: Have my pain managed     Interventions:  -   - See additional Care Plan goals for specific interventions   Outcome: Progressing  Goal: Patient/Fami physical deficits and behaviors that affect risk of falls.   - [place] fall precautions as indicated by assessment.  - Educate pt/family on patient safety including physical limitations  - Instruct pt to call for assistance with activity based on assessme acknowledgement of concerns of patient and caregivers  - Reduce environmental stimuli, as able  - Instruct patient/family in relaxation techniques, as appropriate  - Assess for spiritual and psychosocial needs and initiate Spiritual Care or Behavioral Heal patient's view, family's view, and healthcare provider's view of condition  - Facilitate patient and family articulation of goals for care  - Help patient and family identify pros/cons of alternative solutions  - Provide information as requested by patient patient to explore his/her values, beliefs and/or spiritual images and practices  - Encourage use of breath work, imagery, meditation, relaxation, reiki to ease distress and provide healing  - Encourage use of cultural and spiritual celebrations and ritual

## 2019-11-07 NOTE — PROGRESS NOTES
[facility]    Progress Note    Jae Stage Patient Status:  Inpatient    1965 MRN [id number]   Location [facility] 4W/SW/SE Attending Javier Bradford MD   Hosp Day # 2 PCP Andriy Aguilar MD       Subjective:   Domenica Gonzalez Value Ref Range Status   11/05/2019 0.89 0.70 - 1.30 mg/dL Final   10/22/2019 0.84 0.70 - 1.30 mg/dL Final   10/19/2019 0.82 0.70 - 1.30 mg/dL Final   10/17/2019 0.69 (L) 0.70 - 1.30 mg/dL Final   10/08/2019 0.73 0.70 - 1.30 mg/dL Final   09/30/2019 0.49 ( Scheduled Meds:   • [START ON 11/8/2019] scopolamine  1 patch Transdermal Q72H   • scopolamine  1 patch Transdermal Q72H       Continuous Infusions:   • HYDROmorphone HCl 0.4 mg/hr (11/07/19 0202)                       Roel Garcia MD  11/7/2019

## 2019-11-07 NOTE — HOSPICE RN NOTE
GIP day 3. Dilaudid drip increased to 0.4mg/hr. Pt states that he is feeling better today. Seems more alert during this meeting. Discussed with the pt about going back home. Pt's symptoms seem to be better managed with IV Dilaudid.  Discuss with pt about pl

## 2019-11-08 NOTE — PLAN OF CARE
Problem: Patient/Family Goals  Goal: Patient/Family Long Term Goal  Description  Patient's Long Term Goal: Have my pain managed     Interventions:  -   - See additional Care Plan goals for specific interventions   Outcome: Progressing  Goal: Patient/Fami physical deficits and behaviors that affect risk of falls.   - [place] fall precautions as indicated by assessment.  - Educate pt/family on patient safety including physical limitations  - Instruct pt to call for assistance with activity based on assessme acknowledgement of concerns of patient and caregivers  - Reduce environmental stimuli, as able  - Instruct patient/family in relaxation techniques, as appropriate  - Assess for spiritual and psychosocial needs and initiate Spiritual Care or Behavioral Heal patient's view, family's view, and healthcare provider's view of condition  - Facilitate patient and family articulation of goals for care  - Help patient and family identify pros/cons of alternative solutions  - Provide information as requested by patient patient to explore his/her values, beliefs and/or spiritual images and practices  - Encourage use of breath work, imagery, meditation, relaxation, reiki to ease distress and provide healing  - Encourage use of cultural and spiritual celebrations and ritual

## 2019-11-08 NOTE — PROGRESS NOTES
[facility]    Progress Note    Isiah Campos Patient Status:  Inpatient    1965 MRN [id number]   Location [facility] 4W/SW/SE Attending Clarissa Hernandez MD   Hosp Day # 3 PCP Jesus Pulido MD       Subjective:   Elzbieta Found hours. No results for input(s): PT, INR in the last 168 hours. No results for input(s): PGLU in the last 168 hours.     Creatinine   Date Value Ref Range Status   11/05/2019 0.89 0.70 - 1.30 mg/dL Final   10/22/2019 0.84 0.70 - 1.30 mg/dL Final   10/1 Final   10/01/2019 399.0 150.0 - 450.0 10(3)uL Final   09/30/2019 390.0 150.0 - 450.0 10(3) Final   07/22/2016 260.0 140.0 - 415.0 10 Final       Scheduled Meds:   • scopolamine  1 patch Transdermal Q72H   • scopolamine  1 patch Transdermal Q72H       Co

## 2019-11-08 NOTE — PLAN OF CARE
Problem: Patient/Family Goals  Goal: Patient/Family Long Term Goal  Description  Patient's Long Term Goal: Have my pain managed     Interventions:  -   - See additional Care Plan goals for specific interventions   Outcome: Progressing  Goal: Patient/Fami physical deficits and behaviors that affect risk of falls.   - [place] fall precautions as indicated by assessment.  - Educate pt/family on patient safety including physical limitations  - Instruct pt to call for assistance with activity based on assessme acknowledgement of concerns of patient and caregivers  - Reduce environmental stimuli, as able  - Instruct patient/family in relaxation techniques, as appropriate  - Assess for spiritual and psychosocial needs and initiate Spiritual Care or Behavioral Heal patient's view, family's view, and healthcare provider's view of condition  - Facilitate patient and family articulation of goals for care  - Help patient and family identify pros/cons of alternative solutions  - Provide information as requested by patient patient to explore his/her values, beliefs and/or spiritual images and practices  - Encourage use of breath work, imagery, meditation, relaxation, reiki to ease distress and provide healing  - Encourage use of cultural and spiritual celebrations and ritual

## 2019-11-08 NOTE — HOSPICE RN NOTE
GIP DAY 4 dilaudid drip is at 0.4mg/hour to manage his pain to his abdomen  Barely able to eat regular food anorexic  Room air  Jaundiced  PICC line placed to left brachial site  We will be ordering CADD pump for delivery 11/9/19  POC is to have patient go

## 2019-11-08 NOTE — OCCUPATIONAL THERAPY NOTE
Orders received and chart reviewed. PT discussed with RN Yin Rider and with patient. Patient admitted inpatient hospice status with liver cancer for pain control. Patient is independently ambulating in room and completing self care .  Upon discussion with lexy

## 2019-11-08 NOTE — PHYSICAL THERAPY NOTE
Orders received and chart reviewed. Discussed with ANNA Hurd and with patient. Patient admitted inpatient hospice status with liver cancer for pain control. Patient is independently ambulating in room.  Upon discussion with patient, patient to return home

## 2019-11-09 NOTE — PLAN OF CARE
Problem: Patient/Family Goals  Goal: Patient/Family Long Term Goal  Description  Patient's Long Term Goal: Have my pain managed     Interventions:  -   - See additional Care Plan goals for specific interventions   Outcome: Completed  Goal: Patient/Family deficits and behaviors that affect risk of falls.   - [place] fall precautions as indicated by assessment.  - Educate pt/family on patient safety including physical limitations  - Instruct pt to call for assistance with activity based on assessment  - Mod concerns of patient and caregivers  - Reduce environmental stimuli, as able  - Instruct patient/family in relaxation techniques, as appropriate  - Assess for spiritual and psychosocial needs and initiate Spiritual Care or Behavioral Health consult as neede view, and healthcare provider's view of condition  - Facilitate patient and family articulation of goals for care  - Help patient and family identify pros/cons of alternative solutions  - Provide information as requested by patient/family  - Respect patien values, beliefs and/or spiritual images and practices  - Encourage use of breath work, imagery, meditation, relaxation, reiki to ease distress and provide healing  - Encourage use of cultural and spiritual celebrations and rituals  - Facilitate discussion

## 2019-11-09 NOTE — PROGRESS NOTES
[facility]    Progress Note    Beryl Lebron Patient Status:  Inpatient    1965 MRN [id number]   Location [facility] 4W/SW/SE Attending Sheree Price MD   Hosp Day # 4 PCP Debbie Quintero MD       Subjective:   Edna Espinal MG 2.3 10/19/2019    PHOS 3.3 04/10/2019                   Sahra Odell MD, Victor Manuel Feliciano MD  11/9/2019

## 2019-11-09 NOTE — PROGRESS NOTES
Pt  at [address]. Resusitation not attempted as pt was DNR. Time of Death 1    Family Notified yes  Name and Relation Audra Mixer ( attempt to call mother was made. No answer, no voice mail set up.  Anupam informed of this)    MD Dr Tracey Prajapati

## 2019-11-09 NOTE — PLAN OF CARE
Problem: Patient/Family Goals  Goal: Patient/Family Long Term Goal  Description  Patient's Long Term Goal: Have my pain managed     Interventions:  -   - See additional Care Plan goals for specific interventions   Outcome: Progressing  Goal: Patient/Fami physical deficits and behaviors that affect risk of falls.   - [place] fall precautions as indicated by assessment.  - Educate pt/family on patient safety including physical limitations  - Instruct pt to call for assistance with activity based on assessme acknowledgement of concerns of patient and caregivers  - Reduce environmental stimuli, as able  - Instruct patient/family in relaxation techniques, as appropriate  - Assess for spiritual and psychosocial needs and initiate Spiritual Care or Behavioral Heal patient's view, family's view, and healthcare provider's view of condition  - Facilitate patient and family articulation of goals for care  - Help patient and family identify pros/cons of alternative solutions  - Provide information as requested by patient patient to explore his/her values, beliefs and/or spiritual images and practices  - Encourage use of breath work, imagery, meditation, relaxation, reiki to ease distress and provide healing  - Encourage use of cultural and spiritual celebrations and ritual

## 2019-11-09 NOTE — PROGRESS NOTES
[facility]    Progress Note    Ken Jc Patient Status:  Inpatient    1965 MRN [id number]   Location [facility] 4W/SW/SE Attending Gregoria Coronado MD   Hosp Day # 4 PCP MD Lyubov Elliott MD, Dg Pratt MD  1

## 2019-11-09 NOTE — CM/SW NOTE
MSW visit 11/9/19. The pt appeared fitful in his sleep, had had a rough night ,as per FN, and just wanted to be left alone.   Kandace German, MSW  [facility]  [phone]

## 2019-11-11 NOTE — SPIRITUAL CARE NOTE
Nurse called at 2:00 that family was ready to leave. By the time  arrived they had vanished. Turns out that they went to lunch. They returned and at 4:15 we completed paperwork and  helped mother with life review.    Brother of pt will co

## 2019-11-12 ENCOUNTER — TELEPHONE (OUTPATIENT)
Dept: HEMATOLOGY/ONCOLOGY | Facility: HOSPITAL | Age: 54
End: 2019-11-12

## 2019-11-12 NOTE — TELEPHONE ENCOUNTER
Melissa Vera called to inform  that Micah Mathew passed away in the hospital, and to thank him for the privilege of caring for Micah Mathew. Please advise.

## 2019-11-20 NOTE — DISCHARGE SUMMARY
[facility]    PATIENT'S NAME: Danika Lewis   ATTENDING PHYSICIAN: Carlos Bowman.  Aurelio Can MD   PATIENT ACCOUNT#:   [id number]    LOCATION:  [address] [place] [address] #:   [id number]       YOB: 1965  ADMISSION DATE:       10/ 55:60:50  t: 11/18/2019 11:01:02  [facility] [id number]  [facility]/    cc: Manjeet Bean.  Milad Pulliam MD

## 2019-11-29 NOTE — DISCHARGE SUMMARY
[facility]    PATIENT'S NAME: Celeste Heading   ATTENDING PHYSICIAN: Ivory Day.  Rip Barrett MD   PATIENT ACCOUNT#:   [id number]    LOCATION:  [address] [place] [address] #:   [id number]       YOB: 1965  ADMISSION DATE:       1

## 2019-12-16 NOTE — DISCHARGE SUMMARY
[facility]    PATIENT'S NAME: Letitia Guerra   ATTENDING PHYSICIAN: Lia Carmona.  Rishi Yanez MD   PATIENT ACCOUNT#:   [id number]    LOCATION:  [address] [place] [address]  RECORD #:   [id number]       YOB: 1965  ADMISSION DATE:       1

## 2020-02-07 NOTE — PROGRESS NOTES
Dr Garrick Rasmussen advised to inform pt pill can was normal     LMOM advising Patient Name: Yunier Mi  YOB: 1965  Age: 50year old    Referring Physician: Dr. Loretta Holley     Diagnosis: Hepatocellular carcinoma    History of Present Illness:  Mr. Ron Purcell is a 50year old male with a history of Hepatitis A diagnosed ov heartburn, nausea and vomiting. Genitourinary: Negative for dysuria. Musculoskeletal: Negative for myalgias. Skin: Negative for itching. Neurological: Negative for dizziness. Endo/Heme/Allergies: Does not bruise/bleed easily.    Psychiatric/Behavi

## 2022-03-23 NOTE — PLAN OF CARE
Pt asking for 1 week Lisinopril refill to get pt through until appt.  No need to call patient back, unless there are questions or problems.       Tolerating minimal amounts of intake. Denies nausea, no vomiting. MRI ordered, to complete tomorrow. Blood cultures growing +, MD aware. IV meropenem infusing, IVF. Dilaudid and tramadol prn for pain control.    Problem: Patient Centered Care  Goal: Patie Administer growth factors as ordered  - Implement neutropenic guidelines  Outcome: Progressing     Problem: SAFETY ADULT - FALL  Goal: Free from fall injury  Description  INTERVENTIONS:  - Assess pt frequently for physical needs  - Identify cognitive and p

## 2022-11-29 NOTE — ED NOTES
Patient is wanting to stop Paxlovid because of the nausea and vomiting. Is it safe for her stop. Per Dr. Patton okay to stop. Patient notified   Pharmacy contacted regarding ketamine gtt, awaiting call back

## 2023-04-03 NOTE — PLAN OF CARE
Problem: Patient Centered Care  Goal: Patient preferences are identified and integrated in the patient's plan of care  Description  Interventions:  - What would you like us to know as we care for you?  I work at the hospital  - Provide timely, complete, a Try switching to exercise mode 90 minutes before activity to prevent lows.     Emergency issues: Here are some concerns you should contact us about.  -Vomiting: more than twice.  Please check ketones.  If positive, go to ER. Monitor glucose hourly.   -High glucose (over 300 mg/dL twice in a row) with a pump:  Twice in doubt, take it out.  Change your pump site. Check ketones.  If ketones are negative, take an insulin correction by syringe/pen and recheck glucose in 1 hour.  If glucose is not coming down, please call the clinic. If ketones are moderate or large, drink lots of water, take an insulin correction 1.5 times your usual correction by syringe/pen, and recheck ketones in 1 hour.  If ketones are still present (or you are vomiting), go to the ER.  -Hypoglycemia (low glucose):   If glucose is less than 70 mg/dL, treat with 15g carb (4 glucose tablets), recheck glucose in 15 minutes.  If low again, repeat.   If glucose is less than 54 mg/dL, treat with 30g carb, recheck glucose in 15 minutes.  If low again, repeat.  Keep glucagon in your home in case of severe hypoglycemia and train someone how to use this.    Emergency kit (please ensure you always have these with you):   Glucose tablets  Glucagon  Insulin  Syringes/needles   Extra infusion set (if on a pump)  Ketone strips    Backup insulin plan (in case of pump failure):   If your insulin pump fails, your body will not have any insulin available and your blood glucose levels can get dangerously high. This can result in diabetic ketoacidosis making you very sick (abdominal pain, confusion, vomiting, dehydration, positive urine ketones).    You have an active long acting basal insulin (Degludec: Tresiba / Detemir: Levemir / Glargine: Lantus / Toujeo / Semglee / Basaglar) prescription available. Please pick this up from your pharmacy in case your pump fails.  Basal insulin dose:_____40_______ units once per day.    Immediately after your pump fails and until you  ADULT  Goal: Absence of fever/infection during anticipated neutropenic period  Description  INTERVENTIONS  - Monitor WBC  - Administer growth factors as ordered  - Implement neutropenic guidelines  Outcome: Progressing     Problem: DISCHARGE PLANNING  Goal can  the long acting insulin, use short acting insulin (Aspart: Novolog/Fiasp / Lispro: Humalog / Glulisine:Apidra) injections (pen or vial and syringe) per your correction scale every 4 hours and continue to cover carbohydrates. You can stop this 4 hourly correction after you give yourself the basal insulin dose.    You should also administer short acting insulin subcutaneously to cover carbohydrates and per your correction scale prior to meals.     Contact us for help transitioning back to your pump when this is available.    Contact information:   If you have concerns, please send me a [employer] [place] message or call the clinic at [phone].  For more urgent concerns, please call [phone] after hours/weekends and ask to speak with the endocrinologist on call.      Please let me know if you are having low blood sugars less than 70 or over 350 mg/dL.  Do not wait until your next appointment if this is happening.       routine/schedule  - Consider collaborating with pharmacy to review patient's medication profile  Outcome: Progressing    Pt resting in bed. Ambulates in room independently. IV fluids continued. IV antibiotic given.  Surgical dressings c/d/I. JOHN to bulb suct

## 2024-01-17 NOTE — PAYOR COMM NOTE
--------------  CONTINUED STAY REVIEW-----REQUESTING ADDITIONAL DAY 9/7, 9/8, AND 9/9      Payor: [employer]  Subscriber #:  [id number]  Authorization Number: [id number]    Admit date: 9/4/19  Admit time: 4471    Admitting Physician: Remington Yarbrough Subjective   Sven Gardiner is a 68 y.o. male who presents for here for dizziness.  HPI  Sven is 68 male history of benign essential hypertension gastroesophageal reflux disease enlarged prostate dyslipidemia takes his medication as prescribed patient complaining of dizziness, with moving his head, no nausea vomiting, no headache no fever no chills denies palpitation,  feeling foggy for the past few weeks, dizziness is worse when turning his head from right to left, at about midday patient feels better.  He still complaining of fogginess from time to time especially in the morning time when he is just wakes up.  Review of Systems  10 system review pertinent as above  Objective     There were no vitals taken for this visit.     Physical Exam    HEENT: Atraumatic normocephalic the pupils are equal and round and reactive to light the sclerae nonicteric extraocular motion are intact.  Neck: Is supple without JVD no carotid bruits the trachea is midline there are no masses pulses are equal and bilateral with normal upstroke.  Skin: Normal.  Skin good texture.  Moist.  Good turgor.  No lesions, no rashes.  Lymph: No lymphadenopathy appreciated, no masses, no lesions  Lungs: Are clear to auscultation and percussion, good breath sounds bilaterally, no rhonchi, no wheezing, good diaphragmatic excursion.  Heart: Normal rate and normal rhythm S1, S2, no S3, no gallop, murmur or rub.  Abdomen: Soft, nontender, no organomegaly, good bowel sounds.    Extremities: Full range of motion, good pulses bilateral.  No cyanosis, no clubbing or edema.  Neuro: Cranial nerves II-XII are grossly intact there is no sensory or motor deficits.  Able to move all extremities.    Ears bilateral, redness both eardrums no discharge    Assessment/Plan     Here for follow-up    Complaining of dizziness  In the setting of vestibular dizziness  Medrol Dosepak  Use as directed  Continue nasal spray    Continue with the low-fat, low-cholesterol  Cardiovascular: S1, S2 normal, no murmur, click, rub or gallop, regular rate and rhythm  Abdominal: Firm belly, exquisitely tender to moderate palpation in the mid epigastric region, positive decreased bowel sounds. Extensive hepato-megaly.   No other palp diet,  I recommended Mediterranean diet, which include fish, chicken, vegetables and olive oil  Exercise daily for 30 minutes at least 3 times a week  Continue home medications  Norvasc 5 mg daily.   CACS score is 39.41 11/2022    Hypertension elevated blood pressure 140/88  No added salt diet, do not and salt to your food  Try to exercise every other day for 30 minutes  Continue current medications Norvasc as needed  Patient checks his pressure before he takes it  Take for systolic greater than 150 or diastolic greater than 90    Large prostate  Normal PSA  Nocturia 1 or twice at night      Problem List Items Addressed This Visit       Dyslipidemia - Primary    Relevant Medications    rosuvastatin (Crestor) 20 mg tablet    HTN (hypertension), benign     Other Visit Diagnoses       Benign paroxysmal positional vertigo due to bilateral vestibular disorder        Relevant Medications    methylPREDNISolone (Medrol Dospak) 4 mg tablets            Didier Pennington MD      ERCP performed on 9/6/2019. A stent was placed into the common bile duct. Patient has increasing abdominal pain in the mid epigastric region with elevated creatinine enzymes. This is post ERCP pancreatitis.   IV fluids were started and pain meds increase Throat: lips, mucosa, and tongue normal but dry; teeth and gums normal  Neck: no adenopathy,, supple, symmetrical, trachea midline and thyroid not enlarged, symmetric, no tenderness/mass/nodules  Pulmonary:  clear to auscultation bilaterally.  No wheezes,ra CONCLUSION:  BILIARY:           Images document cholangiogram with stent placement. PANCREATIC DUCT:   Not opacified. OTHER:  Negative. Dictated by (CST): Yaw Copoer MD on 9/06/2019 at 15:55     Approved by (CST):  Yaw Cooper MD on 9/06/ Heart regular rhythm S1-S2 normal.  Abdomen flat soft nontender. Extremities without edema.     Assessment and Plan:   Obstructive jaundice with hyperbilirubinemia  ERCP planned 9/6/2019.   Interventional radiology if unsuccessful.     Post procedure pancr GFR NON-   Date Value Ref Range Status   07/22/2016 60.0   Final            GFR, Non-   Date Value Ref Range Status   09/07/2019 84 >=60 Final   09/04/2019 101 >=60 Final   09/03/2019 84 >=60 Final   08/16/2019 91 >=60 Final Date Action Dose Route User    9/9/2019 0335 Given 4 mg Intravenous Cayla Graff RN    9/8/2019 2001 Given 4 mg Intravenous Cayla Graff RN      traMADol HCl Ora Crater) tab 50 mg     Date Action Dose Route User    9/9/2019 0334 Given 50 mg Oral Tacson, Vi

## 2024-03-22 NOTE — PROGRESS NOTES
[facility]    Progress Note    Beryl Lebron Patient Status:  Inpatient    1965 MRN [id number]   Location [facility] 4W/SW/SE Attending Sheree Price MD   Hosp Day # 1 PCP Debbie Quintero MD       Subjective:   Edna Espinal 18.1*   TP 6.9  --  7.6 7.4       Recent Labs   Lab 10/22/19  0331          No results for input(s): TROP, CK in the last 168 hours. Recent Labs   Lab 10/17/19  1159   INR 1.53*       No results for input(s): PGLU in the last 168 hours.     Valencia Duggan (H) 150.0 - 450.0 10(3)uL Final   10/08/2019 551.0 (H) 150.0 - 450.0 10(3)uL Final   10/01/2019 399.0 150.0 - 450.0 10(3)uL Final   09/30/2019 390.0 150.0 - 450.0 10(3)uL Final   09/27/2019 362.0 150.0 - 450.0 10(3) Final   07/22/2016 260.0 140.0 - 415. 0 Moderate left larger than right pleural effusions and associated compressive atelectasis, with or without superimposed pneumonia. 11. Punctate non-obstructing left renal calculus.   12. Low-density appearance of the intracardiac blood pool raises the possi Yes

## (undated) DEVICE — TROCAR: Brand: KII® SLEEVE

## (undated) DEVICE — Device: Brand: CUSTOM PROCEDURE KIT

## (undated) DEVICE — CULTURE COLLECT/TRANSPORT SYS

## (undated) DEVICE — MINOR GENERAL: Brand: MEDLINE INDUSTRIES, INC.

## (undated) DEVICE — SPHINCTEROTOME: Brand: DREAMTOME™ RX 44

## (undated) DEVICE — TROCAR: Brand: KII FIOS FIRST ENTRY

## (undated) DEVICE — SUTURE PDS II 2-0 CT-2

## (undated) DEVICE — SUTURE PDS II 0 CT-1

## (undated) DEVICE — ENCORE® LATEX ACCLAIM SIZE 8, STERILE LATEX POWDER-FREE SURGICAL GLOVE: Brand: ENCORE

## (undated) DEVICE — [HIGH FLOW INSUFFLATOR,  DO NOT USE IF PACKAGE IS DAMAGED,  KEEP DRY,  KEEP AWAY FROM SUNLIGHT,  PROTECT FROM HEAT AND RADIOACTIVE SOURCES.]: Brand: PNEUMOSURE

## (undated) DEVICE — STANDARD HYPODERMIC NEEDLE,POLYPROPYLENE HUB: Brand: MONOJECT

## (undated) DEVICE — DRAIN RESERVOIR RELIAVAC 100CC

## (undated) DEVICE — MEDI-VAC NON-CONDUCTIVE SUCTION TUBING: Brand: CARDINAL HEALTH

## (undated) DEVICE — COVER SGL STRL LGHT HNDL BLU

## (undated) DEVICE — DERMABOND LIQUID ADHESIVE

## (undated) DEVICE — CULTURE TUBE ANAEROBIC

## (undated) DEVICE — LOCKING DEVICE RX & BIOPSY CAP

## (undated) DEVICE — DECANTER VIAL FLOW DISPENSER

## (undated) DEVICE — SUCTION CANISTER, 3000CC,SAFELINER: Brand: DEROYAL

## (undated) DEVICE — SUTURE MONOCRYL 3-0 Y936H

## (undated) DEVICE — ENDOPATH ETS-FLEX45 ARTICULATING ENDOSCOPIC LINEAR CUTTER, NO RELOAD: Brand: ENDOPATH

## (undated) DEVICE — LAP CHOLE: Brand: MEDLINE INDUSTRIES, INC.

## (undated) DEVICE — DRAPE C-ARM UNIVERSAL

## (undated) DEVICE — DRAIN SILICONE FLAT 10MM

## (undated) DEVICE — TISSUE RETRIEVAL SYSTEM: Brand: INZII RETRIEVAL SYSTEM

## (undated) DEVICE — SUTURE VICRYL 0 J906G

## (undated) DEVICE — KIT DRN VAC BTL PRTNL CATH

## (undated) DEVICE — PLEURX PLEURAL CATH KIT

## (undated) DEVICE — 3M™ IOBAN™ 2 ANTIMICROBIAL INCISE DRAPE 6650EZ: Brand: IOBAN™ 2

## (undated) DEVICE — REM POLYHESIVE ADULT PATIENT RETURN ELECTRODE: Brand: VALLEYLAB

## (undated) DEVICE — GUIDEWIRE DREAM STR .035 450

## (undated) DEVICE — RETRIEVAL BALLOON CATHETER: Brand: EXTRACTOR™ PRO RX

## (undated) DEVICE — Device: Brand: DEFENDO AIR/WATER/SUCTION AND BIOPSY VALVE

## (undated) DEVICE — ETS45 RELOAD STANDARD 45MM: Brand: ENDOPATH

## (undated) DEVICE — YANKAUER SUCTION INSTRUMENT NO CONTROL VENT, BULB TIP, CLEAR: Brand: YANKAUER

## (undated) DEVICE — CONMED SCOPE SAVER BITE BLOCK, 20X27 MM: Brand: SCOPE SAVER

## (undated) DEVICE — DRAPE SHEET TRANSVERSE LAP

## (undated) DEVICE — 12 ML SYRINGE LUER-LOCK TIP: Brand: MONOJECT

## (undated) DEVICE — SUTURE ETHIBOND 0 CT-1

## (undated) DEVICE — DISPOSABLE LAPAROSCOPIC CLIP APPLIER WITH 20 CLIPS.: Brand: EPIX® UNIVERSAL CLIP APPLIER

## (undated) DEVICE — SOL  .9 1000ML BTL

## (undated) DEVICE — SOLUTION SURG DURA PREP HAZMAT

## (undated) DEVICE — ENCORE® LATEX MICRO SIZE 8, STERILE LATEX POWDER-FREE SURGICAL GLOVE: Brand: ENCORE

## (undated) DEVICE — LINE MNTR ADLT SET O2 INTMD

## (undated) DEVICE — ENDOSCOPY PACK - LOWER: Brand: MEDLINE INDUSTRIES, INC.

## (undated) DEVICE — NEEDLE HPO 18GA 1.5IN ECLPS

## (undated) DEVICE — ENCORE® LATEX ACCLAIM SIZE 7.5, STERILE LATEX POWDER-FREE SURGICAL GLOVE: Brand: ENCORE

## (undated) DEVICE — SUTURE ETHILON 3-0 PS-2

## (undated) DEVICE — UNDYED BRAIDED (POLYGLACTIN 910), SYNTHETIC ABSORBABLE SUTURE: Brand: COATED VICRYL

## (undated) DEVICE — SOL  .9 3000ML

## (undated) NOTE — LETTER
[address] [place] [address]  Authorization for Invasive Procedures  1.  I hereby authorize  Dr. Wili Heard / Brittany Puckett  my physician and whomever may be designated as the doctor's assistant, to perform the following operation and/or procedure: performed for the purposes of advancing medicine, science, and/or education, provided my identity is not revealed. If the procedure has been videotaped, the physician/surgeon will obtain the original videotape.  The hospital will not be responsible for stor My signature below affirms that prior to the time of the procedure, I have explained to the patient and/or his legal representative, the risks and benefits involved in the proposed treatment and any reasonable alternative to the proposed treatment.  I have

## (undated) NOTE — LETTER
[facility], [place]  Authorization for Invasive Procedures  1.  I hereby authorize Dr. Michelle Hernandez , my physician and whomever may be designated as the doctor's assistant, to perform the following operation and/or procedure:  Truman Reese 5. I consent to the photographing of the operations or procedures to be performed for the purposes of advancing medicine, science, and/or education, provided my identity is not revealed.  If the procedure has been videotaped, the physician/surgeon will obta __________ Time: ___________    Statement of Physician  My signature below affirms that prior to the time of the procedure, I have explained to the patient and/or his legal representative, the risks and benefits involved in the proposed treatment and any r

## (undated) NOTE — LETTER
[facility]  Administration of Anesthesia  1.  Anthony Ga, or _________________________________ acting on his behalf, (Patient) (Dependent/Representative) request to receive anesthesia for my pending procedure/operation/treatmen infections, high spinal block, spinal bleeding, seizure, cardiac arrest and death. 7. AWARENESS: I understand that it is possible (but unlikely) to have explicit memory of events from the operating room while under general anesthesia.   8. ELECTROCONVULSIV unconscious pt /Relationship    My signature below affirms that prior to the time of the procedure, I have explained to the patient and/or his/her guardian, the risks and benefits of undergoing anesthesia, as well as any reasonable alternatives.     _______

## (undated) NOTE — LETTER
[address] [place] [address]  Authorization for Invasive Procedures  1.  I hereby authorize  Dr. Farhad Garcia , my physician and whomever may be designated as the doctor's assistant, to perform the following operation and/or procedure: Princess Shah performed for the purposes of advancing medicine, science, and/or education, provided my identity is not revealed. If the procedure has been videotaped, the physician/surgeon will obtain the original videotape.  The hospital will not be responsible for stor My signature below affirms that prior to the time of the procedure, I have explained to the patient and/or his legal representative, the risks and benefits involved in the proposed treatment and any reasonable alternative to the proposed treatment.  I have

## (undated) NOTE — LETTER
[address] [place] [address]  Authorization for Invasive Procedures  1.  I hereby authorize  Dr. Nain Landeros , my physician and whomever may be designated as the doctor's assistant, to perform the following operation and/or procedure: Gabriel Jackson performed for the purposes of advancing medicine, science, and/or education, provided my identity is not revealed. If the procedure has been videotaped, the physician/surgeon will obtain the original videotape.  The hospital will not be responsible for stor My signature below affirms that prior to the time of the procedure, I have explained to the patient and/or his legal representative, the risks and benefits involved in the proposed treatment and any reasonable alternative to the proposed treatment.  I have

## (undated) NOTE — LETTER
[facility]  Administration of Anesthesia  1.  Janette Martino, or _________________________________ acting on his behalf, (Patient) (Dependent/Representative) request to receive anesthesia for my pending procedure/operation/treatmen infections, high spinal block, spinal bleeding, seizure, cardiac arrest and death. 7. AWARENESS: I understand that it is possible (but unlikely) to have explicit memory of events from the operating room while under general anesthesia.   8. ELECTROCONVULSIV unconscious pt /Relationship    My signature below affirms that prior to the time of the procedure, I have explained to the patient and/or his/her guardian, the risks and benefits of undergoing anesthesia, as well as any reasonable alternatives.     _______

## (undated) NOTE — LETTER
[address] [place] [address]  Authorization for Invasive Procedures  1.  I hereby authorize  Dr. Francesca Hickey , my physician and whomever may be designated as the doctor's assistant, to perform the following operation and/or procedure: Almaz Hernandez performed for the purposes of advancing medicine, science, and/or education, provided my identity is not revealed. If the procedure has been videotaped, the physician/surgeon will obtain the original videotape.  The hospital will not be responsible for stor My signature below affirms that prior to the time of the procedure, I have explained to the patient and/or his legal representative, the risks and benefits involved in the proposed treatment and any reasonable alternative to the proposed treatment.  I have

## (undated) NOTE — LETTER
[address], [place]  Authorization for Invasive Procedures  1.  I hereby authorize Dr. Shoshana Dawkins , my physician and whomever may be designated as the doctor's assistant, to perform the following operation and/or procedure:  endoscopicre 5. I consent to the photographing of the operations or procedures to be performed for the purposes of advancing medicine, science, and/or education, provided my identity is not revealed.  If the procedure has been videotaped, the physician/surgeon will obta __________ Time: ___________    Statement of Physician  My signature below affirms that prior to the time of the procedure, I have explained to the patient and/or his legal representative, the risks and benefits involved in the proposed treatment and any r

## (undated) NOTE — LETTER
[facility], [place]  Authorization for Invasive Procedures  1.  I hereby authorize Dr. Cinthia Dance , my physician and whomever may be designated as the doctor's assistant, to perform the following operation and/or procedure:  Nemo Lauren performed for the purposes of advancing medicine, science, and/or education, provided my identity is not revealed. If the procedure has been videotaped, the physician/surgeon will obtain the original videotape.  The hospital will not be responsible for stor My signature below affirms that prior to the time of the procedure, I have explained to the patient and/or his legal representative, the risks and benefits involved in the proposed treatment and any reasonable alternative to the proposed treatment.  I have

## (undated) NOTE — ED AVS SNAPSHOT
Seth Oliveros   MRN: [id number]    Department:  [facility] Emergency Department   Date of Visit:  3/17/2019           Disclosure     Insurance plans vary and the physician(s) referred by the ER may not be covered by your plan.  Please con CARE PHYSICIAN AT ONCE OR RETURN IMMEDIATELY TO THE EMERGENCY DEPARTMENT. If you have been prescribed any medication(s), please fill your prescription right away and begin taking the medication(s) as directed.   If you believe that any of the medications

## (undated) NOTE — LETTER
[facility], [place]  Authorization for Invasive Procedures  1.  I hereby authorize Dr. Donna Ceballos or Dr. Petty Ely , my physician and whomever may be designated as the doctor's assistant, to perform the following operation and/or proced performed for the purposes of advancing medicine, science, and/or education, provided my identity is not revealed. If the procedure has been videotaped, the physician/surgeon will obtain the original videotape.  The hospital will not be responsible for stor My signature below affirms that prior to the time of the procedure, I have explained to the patient and/or his legal representative, the risks and benefits involved in the proposed treatment and any reasonable alternative to the proposed treatment.  I have

## (undated) NOTE — LETTER
[facility], [place]  Authorization for Invasive Procedures  1.  I hereby authorize  Dr Jacy Osborne  my physician and whomever may be designated as the doctor's assistant, to perform the following operation and/or procedure: Hepatic performed for the purposes of advancing medicine, science, and/or education, provided my identity is not revealed. If the procedure has been videotaped, the physician/surgeon will obtain the original videotape.  The hospital will not be responsible for stor My signature below affirms that prior to the time of the procedure, I have explained to the patient and/or his legal representative, the risks and benefits involved in the proposed treatment and any reasonable alternative to the proposed treatment.  I have

## (undated) NOTE — LETTER
[facility]  Administration of Anesthesia  1.  Jonel Nair, or _________________________________ acting on his behalf, (Patient) (Dependent/Representative) request to receive anesthesia for my pending procedure/operation/treatmen infections, high spinal block, spinal bleeding, seizure, cardiac arrest and death. 7. AWARENESS: I understand that it is possible (but unlikely) to have explicit memory of events from the operating room while under general anesthesia.   8. ELECTROCONVULSIV unconscious pt /Relationship    My signature below affirms that prior to the time of the procedure, I have explained to the patient and/or his/her guardian, the risks and benefits of undergoing anesthesia, as well as any reasonable alternatives.     _______

## (undated) NOTE — LETTER
[address] [place] [address]  Authorization for Surgical Operation or Procedure    1.  I hereby authorize Dr. Olaf Guajardo, my physician and the assistant, to perform the following operation and/or procedure:  Computerized Tomography Byron Bradley 5. I consent to the photographing of the operations or procedures to be performed for the purposes of advancing medicine, science, and/or education, provided my identity is not revealed.  If the procedure has been videotaped, the physician/surgeon will obta risks and benefits involved in the proposed treatment and any reasonable alternative to the proposed treatment. I have also explained the risks and benefits involved in the refusal of the proposed treatment and have answered the patient's questions.  If I h

## (undated) NOTE — ED AVS SNAPSHOT
Michael Poster   MRN: [id number]    Department:  [facility] Emergency Department   Date of Visit:  3/18/2019           Disclosure     Insurance plans vary and the physician(s) referred by the ER may not be covered by your plan.  Please con CARE PHYSICIAN AT ONCE OR RETURN IMMEDIATELY TO THE EMERGENCY DEPARTMENT. If you have been prescribed any medication(s), please fill your prescription right away and begin taking the medication(s) as directed.   If you believe that any of the medications

## (undated) NOTE — LETTER
04/04/18     Rosa Marr MD  [address]  [address]         Re:  Sanjeev Pink   YOB: 1965       Dear  Dr. Priscilla Buck MD,      I had the pleasure of seeing your patient, Sanjeev Pink in the [facility]:    finasteride 5 MG Oral Tab 5 mg. 1 mg daily  Disp:  Rfl:      No current facility-administered medications on file prior to visit.      Allergies:  No Known Allergies    Review of Systems:    Review of Systems   Constitutional: Negative for chills an treatment, potential risk and alternatives. Questions were answered. The patient does wish to proceed with the procedure. Mr. Salena Adkins will be scheduled for hepatic angiography within the next two weeks.   This will be followed by Y-90 radioembolizatio

## (undated) NOTE — ED AVS SNAPSHOT
Danilo Power   MRN: [id number]    Department:  [facility] Emergency Department   Date of Visit:  3/19/2019           Disclosure     Insurance plans vary and the physician(s) referred by the ER may not be covered by your plan.  Please con CARE PHYSICIAN AT ONCE OR RETURN IMMEDIATELY TO THE EMERGENCY DEPARTMENT. If you have been prescribed any medication(s), please fill your prescription right away and begin taking the medication(s) as directed.   If you believe that any of the medications

## (undated) NOTE — LETTER
[facility]  Administration of Anesthesia  1.  Jaky Rios, or _________________________________ acting on his behalf, (Patient) (Dependent/Representative) request to receive anesthesia for my pending procedure/operation/treatmen infections, high spinal block, spinal bleeding, seizure, cardiac arrest and death. 7. AWARENESS: I understand that it is possible (but unlikely) to have explicit memory of events from the operating room while under general anesthesia.   8. ELECTROCONVULSIV unconscious pt /Relationship    My signature below affirms that prior to the time of the procedure, I have explained to the patient and/or his/her guardian, the risks and benefits of undergoing anesthesia, as well as any reasonable alternatives.     _______

## (undated) NOTE — IP AVS SNAPSHOT
Patient Demographics     Address  [address] [facility] [address] [facility] [address] Phone  [phone] [facility])  [phone] (Mobile) *Preferred* E-mail Address  [email or web address]      Emergency Contact(s)     Name Relation Home Work Dany Resp  20 Filed at 11/08/2019 2000   Temp  99.1 °F (37.3 °C) Filed at 11/08/2019 2000   SpO2  100 % Filed at 11/08/2019 2000      Lab Results Last 24 Hours    No matching results found     Microbiology Results (All)     None         H&P - H&P Note      H&P MEDICATIONS:  Dilaudid 4 mg as needed for pain, naloxone nasal liquid as needed, Zofran 4 mg q.8 h. as needed, Compazine 10 mg q.6 h. as needed, tramadol 50 mg every 6 hours as needed.     ALLERGIES:  CT contrast.    FAMILY HISTORY:  Father  at age 54 o will follow for pain control. Goals for care will be pain control and comfort measures only. No aggressive treatment since the patient's prognosis is grave. The patient is a DO NOT RESUSCITATE. Dictated By Judah Thrasher MD  d: 11/05/2019 17:41:2 Orders received and chart reviewed. PT discussed with ANNA Baez and with patient. Patient admitted inpatient hospice status with liver cancer for pain control. Patient is independently ambulating in room and completing self care .  Upon discussion with lexy

## (undated) NOTE — LETTER
[facility], [place]  Authorization for Invasive Procedures  1.  I hereby authorize Dr. Wili Heard , my physician and whomever may be designated as the doctor's assistant, to perform the following operation and/or procedure:  Comput 5. I consent to the photographing of the operations or procedures to be performed for the purposes of advancing medicine, science, and/or education, provided my identity is not revealed.  If the procedure has been videotaped, the physician/surgeon will obta __________ Time: ___________    Statement of Physician  My signature below affirms that prior to the time of the procedure, I have explained to the patient and/or his legal representative, the risks and benefits involved in the proposed treatment and any r

## (undated) NOTE — LETTER
[facility], [place]  Authorization for Invasive Procedures  1.  I hereby authorize Dr. Sherrill Ireland , my physician and whomever may be designated as the doctor's assistant, to perform the following operation and/or procedure:  Ranuflo Cloud performed for the purposes of advancing medicine, science, and/or education, provided my identity is not revealed. If the procedure has been videotaped, the physician/surgeon will obtain the original videotape.  The hospital will not be responsible for stor My signature below affirms that prior to the time of the procedure, I have explained to the patient and/or his legal representative, the risks and benefits involved in the proposed treatment and any reasonable alternative to the proposed treatment.  I have

## (undated) NOTE — LETTER
June 18, 2019       Danilo Power        To Whom It May Concern:    Danilo Power has been under our care regarding ongoing medical issues. Because of this, he has been required to restrict his physical activities. Please excuse Mr. Maya Bi

## (undated) NOTE — LETTER
[facility], [place]  Authorization for Invasive Procedures  1.  I hereby authorize Dr. Cinthia Dance , my physician and whomever may be designated as the doctor's assistant, to perform the following operation and/or procedure:  Nemo Lauren performed for the purposes of advancing medicine, science, and/or education, provided my identity is not revealed. If the procedure has been videotaped, the physician/surgeon will obtain the original videotape.  The hospital will not be responsible for stor My signature below affirms that prior to the time of the procedure, I have explained to the patient and/or his legal representative, the risks and benefits involved in the proposed treatment and any reasonable alternative to the proposed treatment.  I have